# Patient Record
Sex: FEMALE | Race: WHITE | NOT HISPANIC OR LATINO | Employment: OTHER | URBAN - METROPOLITAN AREA
[De-identification: names, ages, dates, MRNs, and addresses within clinical notes are randomized per-mention and may not be internally consistent; named-entity substitution may affect disease eponyms.]

---

## 2017-01-17 DIAGNOSIS — C50.411 MALIGNANT NEOPLASM OF UPPER-OUTER QUADRANT OF RIGHT FEMALE BREAST (HCC): Primary | ICD-10-CM

## 2017-02-02 ENCOUNTER — TELEPHONE (OUTPATIENT)
Dept: FAMILY MEDICINE CLINIC | Facility: CLINIC | Age: 77
End: 2017-02-02

## 2017-02-02 DIAGNOSIS — R60.0 BILATERAL EDEMA OF LOWER EXTREMITY: Primary | ICD-10-CM

## 2017-02-07 ENCOUNTER — TELEPHONE (OUTPATIENT)
Dept: ONCOLOGY | Facility: HOSPITAL | Age: 77
End: 2017-02-07

## 2017-02-07 DIAGNOSIS — C50.411 MALIGNANT NEOPLASM OF UPPER-OUTER QUADRANT OF RIGHT FEMALE BREAST (HCC): Primary | ICD-10-CM

## 2017-02-07 NOTE — TELEPHONE ENCOUNTER
"Pt called because she received notification that she could have another compression sleeve covered by her insurance. She needs an order however. She would like to get it from [facility]. She has had the \"TributeNight\" sleeve in the past and likes it. Order for compression sleeve placed in computer and faxed to [facility].   "

## 2017-03-01 ENCOUNTER — DOCUMENTATION (OUTPATIENT)
Dept: ONCOLOGY | Facility: CLINIC | Age: 77
End: 2017-03-01

## 2017-03-06 ENCOUNTER — OFFICE VISIT (OUTPATIENT)
Dept: ONCOLOGY | Facility: CLINIC | Age: 77
End: 2017-03-06

## 2017-03-06 ENCOUNTER — APPOINTMENT (OUTPATIENT)
Dept: LAB | Facility: HOSPITAL | Age: 77
End: 2017-03-06

## 2017-03-06 ENCOUNTER — APPOINTMENT (OUTPATIENT)
Dept: ONCOLOGY | Facility: CLINIC | Age: 77
End: 2017-03-06

## 2017-03-06 VITALS
OXYGEN SATURATION: 99 % | HEIGHT: 64 IN | HEART RATE: 70 BPM | DIASTOLIC BLOOD PRESSURE: 100 MMHG | SYSTOLIC BLOOD PRESSURE: 175 MMHG | TEMPERATURE: 97.5 F | RESPIRATION RATE: 18 BRPM | WEIGHT: 181.4 LBS | BODY MASS INDEX: 30.97 KG/M2

## 2017-03-06 DIAGNOSIS — Z12.31 ENCOUNTER FOR SCREENING MAMMOGRAM FOR MALIGNANT NEOPLASM OF BREAST: ICD-10-CM

## 2017-03-06 DIAGNOSIS — M81.0 OSTEOPOROSIS: ICD-10-CM

## 2017-03-06 DIAGNOSIS — C50.411 MALIGNANT NEOPLASM OF UPPER-OUTER QUADRANT OF RIGHT FEMALE BREAST (HCC): Primary | ICD-10-CM

## 2017-03-06 PROCEDURE — 99213 OFFICE O/P EST LOW 20 MIN: CPT | Performed by: INTERNAL MEDICINE

## 2017-03-06 PROCEDURE — G0463 HOSPITAL OUTPT CLINIC VISIT: HCPCS | Performed by: INTERNAL MEDICINE

## 2017-03-06 RX ORDER — MORPHINE SULFATE 30 MG/1
60 TABLET ORAL EVERY 4 HOURS PRN
COMMUNITY
End: 2017-03-06

## 2017-03-06 RX ORDER — MORPHINE SULFATE 100 MG/1
180 TABLET ORAL 3 TIMES DAILY
COMMUNITY
End: 2017-03-06

## 2017-03-06 NOTE — PROGRESS NOTES
Subjective:     Reason for follow up:   1. Right breast cancer, stage IA ([id number]), ER/LA and HER2/remberto positive   * status post mastectomy.    * Adjuvant Aromasin was started on 11/13/2014.   2. Osteoporosis.      History of Present Ilness: Tiff Del Rio presents for follow-up of breast cancer. She's on Aromasin. She has lymphedema and a sleeve was ordered for her through [facility]. She feels well without chest pain, vision changes, or new concerns. Mammogram in December was good. No new breast concerns. Tolerating Aromasin well.     Past Medical   Past Medical History   Diagnosis Date   • Breast cancer    • Cancer      Stage IA right breast cancer   • Diabetes mellitus    • Hypertension    • Hypothyroidism    • Osteoporosis    • Stroke      Hemorrhagic   • Stroke, hemorrhagic 2004   • Thyroid cancer      Patient Active Problem List   Diagnosis   • Viral syndrome   • Pneumonia of right lower lobe due to infectious organism   • Hypertension   • Type 2 diabetes mellitus, uncontrolled   • Malignant neoplasm of upper-outer quadrant of right female breast   • Osteoporosis     Social History   Social History     Social History   • Marital status:      Spouse name: N/A   • Number of children: N/A   • Years of education: N/A     Occupational History   • Homemaker      Social History Main Topics   • Smoking status: Never Smoker   • Smokeless tobacco: Never Used   • Alcohol use Yes      Comment: Occasional   • Drug use: No   • Sexual activity: Defer     Other Topics Concern   • Not on file     Social History Narrative      Family History  Family History   Problem Relation Age of Onset   • Hypertension Mother    • Stroke Mother    • Hypertension Father    • Vision loss Father    • Cancer Maternal Grandmother 43     Ovarian     Allergies  Allergies   Allergen Reactions   • Aspirin    • Codeine Phosphate        Medications: The current medication list was reviewed in the EMR.    Review of Systems  Review of Systems  "  Constitutional: Negative for activity change, appetite change, chills, diaphoresis, fatigue, fever and unexpected weight change.   Respiratory: Negative for cough, chest tightness and shortness of breath.    Cardiovascular: Negative for chest pain, palpitations and leg swelling.   Gastrointestinal: Negative for abdominal pain, blood in stool, constipation, diarrhea, nausea and vomiting.   Musculoskeletal: Negative for arthralgias, joint swelling and myalgias.   Hematological: Negative for adenopathy. Does not bruise/bleed easily.          Objective:     Vitals:    03/06/17 1451 03/06/17 1504   BP: (!) 180/120  Comment: Millicent Cobb MA rechecked b/p 175/100   Pulse: 70    Resp: 18    Temp: 97.5 °F (36.4 °C)    SpO2: 99%    Weight: 181 lb 6.4 oz (82.3 kg)    Height: 63.98\" (162.5 cm)    PainSc: 0-No pain      Current Status 3/6/2017   ECOG score 0     GENERAL:  Well-developed, well-nourished female in no acute distress.   SKIN:  Warm, dry without rashes, purpura or petechiae.  LYMPHATICS:  No cervical, supraclavicular, axillary adenopathy.  CHEST:  Lungs clear to percussion and auscultation. Good airflow. Left breast status post mastectomy without palpable masses or skin changes.   CARDIAC:  Regular rate and rhythm without murmurs, rubs or gallops. Normal S1,S2. No edema  EXTREMITIES:  No clubbing, cyanosis or edema.  PSYCHIATRIC:  Normal affect and mood.      Labs and Imaging  Results for orders placed or performed in visit on 09/30/16   TSH   Result Value Ref Range    TSH 0.347 0.270 - 4.200 mIU/mL       Breast imaging: Mammo 12/2016:  IMPRESSION:  There are no findings suspicious for malignancy in the left  breast. Routine follow-up mammography is recommended.      BI-RADS Category 1: Negative.    Assessment/Plan     Assessment:   1. Right breast cancer, upper outer quadrant, stage IA, ER/PA and HER-2/remberto positive, status post mastectomy. The tumor was very small in size and therefore she was not given adjuvant " Herceptin or adjuvant chemotherapy. The greatest dimension of the invasive carcinoma was 3 mm. She is on adjuvant hormonal therapy with Aromasin, which was started in 11/2014. She tolerates Aromasin well. Currently JAX.  2. Osteoporosis with 5% improvement on last scan. Continue calcium and vitamin D.  3. Uncontrolled hypertension. She will follow up with Dr Dorsey. She's on multiple agents, but has run out of Toprol. It's been ordered and should arrive soon.   Plan:     1. Continue exemestane.  2. Annual mammogram due December 2017  3. DEXA scan due November 2017.  4. Patient was instructed on the importance of physical activity, healthy diet, and self breast exams.  Patient will continue calcium and vitamin D supplementation.    5. She will follow up with DR Dorsey regarding medication and blood pressure. If she develops chest pain, vision changes, etc, she knows to go to ER.   Follow-up in 10 months. I asked the patient to call for any questions, concerns, or new symptoms.

## 2017-03-17 ENCOUNTER — TRANSCRIBE ORDERS (OUTPATIENT)
Dept: ADMINISTRATIVE | Facility: HOSPITAL | Age: 77
End: 2017-03-17

## 2017-03-17 DIAGNOSIS — E04.1 THYROID NODULE: Primary | ICD-10-CM

## 2017-03-22 ENCOUNTER — HOSPITAL ENCOUNTER (OUTPATIENT)
Dept: ULTRASOUND IMAGING | Facility: HOSPITAL | Age: 77
Discharge: HOME | End: 2017-03-22
Attending: OTOLARYNGOLOGY | Admitting: OTOLARYNGOLOGY

## 2017-03-22 DIAGNOSIS — E04.1 THYROID NODULE: ICD-10-CM

## 2017-03-22 PROCEDURE — 76536 US EXAM OF HEAD AND NECK: CPT

## 2017-04-07 ENCOUNTER — OFFICE VISIT (OUTPATIENT)
Dept: FAMILY MEDICINE CLINIC | Facility: CLINIC | Age: 77
End: 2017-04-07

## 2017-04-07 VITALS
HEART RATE: 85 BPM | BODY MASS INDEX: 31.41 KG/M2 | SYSTOLIC BLOOD PRESSURE: 146 MMHG | TEMPERATURE: 98 F | HEIGHT: 64 IN | WEIGHT: 184 LBS | DIASTOLIC BLOOD PRESSURE: 104 MMHG | OXYGEN SATURATION: 95 % | RESPIRATION RATE: 16 BRPM

## 2017-04-07 DIAGNOSIS — I10 ESSENTIAL HYPERTENSION: Primary | ICD-10-CM

## 2017-04-07 DIAGNOSIS — H10.021 PINK EYE DISEASE OF RIGHT EYE: ICD-10-CM

## 2017-04-07 DIAGNOSIS — E03.9 HYPOTHYROIDISM (ACQUIRED): ICD-10-CM

## 2017-04-07 DIAGNOSIS — IMO0001 UNCONTROLLED TYPE 2 DIABETES MELLITUS WITHOUT COMPLICATION, WITHOUT LONG-TERM CURRENT USE OF INSULIN: ICD-10-CM

## 2017-04-07 PROCEDURE — 99214 OFFICE O/P EST MOD 30 MIN: CPT | Performed by: FAMILY MEDICINE

## 2017-04-07 RX ORDER — TOBRAMYCIN 3 MG/ML
2 SOLUTION/ DROPS OPHTHALMIC EVERY 8 HOURS SCHEDULED
Qty: 5 ML | Refills: 0 | Status: SHIPPED | OUTPATIENT
Start: 2017-04-07 | End: 2019-05-14

## 2017-04-07 RX ORDER — AMLODIPINE BESYLATE 10 MG/1
10 TABLET ORAL DAILY
Qty: 90 TABLET | Refills: 1 | Status: SHIPPED | OUTPATIENT
Start: 2017-04-07 | End: 2018-09-19 | Stop reason: SDUPTHER

## 2017-04-07 RX ORDER — OLMESARTAN MEDOXOMIL AND HYDROCHLOROTHIAZIDE 40/12.5 40; 12.5 MG/1; MG/1
1 TABLET ORAL DAILY
Qty: 90 TABLET | Refills: 1 | Status: SHIPPED | OUTPATIENT
Start: 2017-04-07 | End: 2017-05-08 | Stop reason: SDUPTHER

## 2017-04-07 RX ORDER — LEVOTHYROXINE SODIUM 112 UG/1
112 TABLET ORAL DAILY
Qty: 90 TABLET | Refills: 1 | Status: SHIPPED | OUTPATIENT
Start: 2017-04-07 | End: 2017-10-11 | Stop reason: SDUPTHER

## 2017-04-07 RX ORDER — METOPROLOL SUCCINATE 200 MG/1
200 TABLET, EXTENDED RELEASE ORAL DAILY
Qty: 90 TABLET | Refills: 1 | Status: SHIPPED | OUTPATIENT
Start: 2017-04-07 | End: 2018-09-19 | Stop reason: SDUPTHER

## 2017-04-07 RX ORDER — FUROSEMIDE 20 MG/1
20 TABLET ORAL DAILY
Qty: 90 TABLET | Refills: 1 | Status: SHIPPED | OUTPATIENT
Start: 2017-04-07 | End: 2017-05-26 | Stop reason: SDUPTHER

## 2017-04-07 NOTE — PATIENT INSTRUCTIONS
This is a very nice 76-year-old who is here for follow-up for blood pressure and diabetes and thyroid.  I will request blood work to ensure that with her ENT doctor when available.  I also will renew her prescriptions to make sure she is taking all of the medicine she is supposed to be on.

## 2017-04-07 NOTE — PROGRESS NOTES
Subjective   Tiff Del Rio is a 76 y.o. female presenting with   Chief Complaint   Patient presents with   • Hypertension     check up    • Diabetes     check up    • Hypothyroidism     check up    • Med Refill     all meds (expect the chemo tab)  sent to [facility]     • Eye Problem     has a red spot under her right lid    • Labs Only     needs a TSH per another dr PATTERSON Comments: 76-year-old  white female nonsmoker with a history of right breast carcinoma and chronic right arm lymphedema and cancer of the thyroid status post resection and followed regularly by Dr. Hameed, is here for routine checkup.  Her blood pressure is not controlled at all but I rechecked it in her left arm and got 160/96.  She does not have any headache or chest pain or palpitations but on reviewing her medication, it turns out that she is not taking her beta blocker nor is she on her calcium channel blocker.  Apparently this was not sent by her mail order, so she is changing to [facility].    She also has had weeping from the right eye with some redness lately.  She says that she take she has pink eye, but also says that for quite some time it spontaneously will suddenly drain suggestive of chronic blepharitis.  I told her I would be happy to send out a prescription for eyedrops but that she should check with her ophthalmologist as well.    He has some blood work requested by her ENT doctor so I will add that to her usual medication.    I reviewed her serial ultrasounds of the neck and noticed that she has a fairly stable enlarged lymph node on the left at 1.8 cm but a slowly enlarging lymph node on the right that had been 2.0 cm in 2014 and now was measured at 2.5 cm.  I told her it would be important to continue her ultrasounds to confirm that this does not get any bigger.    Hypertension     Diabetes     Hypothyroidism     Eye Problem    Associated symptoms include an eye discharge.        The following portions of the  patient's history were reviewed and updated as appropriate: current medications, past family history, past medical history, past social history, past surgical history and problem list.    Review of Systems   Eyes: Positive for discharge.   All other systems reviewed and are negative.      Objective   Physical Exam   Constitutional: She is oriented to person, place, and time. She appears well-developed and well-nourished. No distress.   HENT:   Head: Normocephalic and atraumatic.   Eyes: EOM are normal. Pupils are equal, round, and reactive to light. Right eye exhibits discharge. No scleral icterus.   Neck: Normal range of motion. Neck supple. No thyromegaly present.   Cardiovascular: Normal rate, regular rhythm, normal heart sounds and intact distal pulses.  Exam reveals no friction rub.    No murmur heard.  Pulmonary/Chest: Effort normal and breath sounds normal. No respiratory distress. She has no wheezes. She has no rales.   Musculoskeletal: Normal range of motion. She exhibits no edema or tenderness.   Lymphadenopathy:     She has no cervical adenopathy.   Neurological: She is alert and oriented to person, place, and time. No cranial nerve deficit. Coordination normal.   Skin: Skin is warm and dry. No rash noted. She is not diaphoretic.   Psychiatric: She has a normal mood and affect. Her behavior is normal.   Nursing note and vitals reviewed.      Assessment/Plan   Tiff was seen today for hypertension, diabetes, hypothyroidism, med refill, eye problem and labs only.    Diagnoses and all orders for this visit:    Essential hypertension  -     Comprehensive Metabolic Panel  -     TSH  -     T4, Free    Hypothyroidism (acquired)  -     TSH  -     T4, Free  -     Anti-Thyroglobulin Antibody  -     Thyroglobulin    Uncontrolled type 2 diabetes mellitus without complication, without long-term current use of insulin  -     Comprehensive Metabolic Panel  -     Hemoglobin A1c    Pink eye disease of right eye    Other  orders  -     amLODIPine (NORVASC) 10 MG tablet; Take 1 tablet by mouth Daily.  -     olmesartan-hydrochlorothiazide (BENICAR HCT) 40-12.5 MG per tablet; Take 1 tablet by mouth Daily.  -     furosemide (LASIX) 20 MG tablet; Take 1 tablet by mouth Daily.  -     levothyroxine (SYNTHROID, LEVOTHROID) 112 MCG tablet; Take 1 tablet by mouth Daily.  -     metFORMIN (GLUCOPHAGE) 500 MG tablet; Take 1 tablet by mouth Daily With Breakfast.  -     metoprolol succinate XL (TOPROL-XL) 200 MG 24 hr tablet; Take 1 tablet by mouth Daily.  -     tobramycin (TOBREX) 0.3 % solution ophthalmic solution; Administer 2 drops to the right eye Every 8 (Eight) Hours.                   I would like him to return for another visit in 6 month(s)

## 2017-04-12 LAB
ALBUMIN SERPL-MCNC: 4.4 G/DL (ref 3.5–5.2)
ALBUMIN/GLOB SERPL: 1.6 G/DL
ALP SERPL-CCNC: 90 U/L (ref 39–117)
ALT SERPL-CCNC: 16 U/L (ref 1–33)
AST SERPL-CCNC: 16 U/L (ref 1–32)
BILIRUB SERPL-MCNC: 0.3 MG/DL (ref 0.1–1.2)
BUN SERPL-MCNC: 12 MG/DL (ref 8–23)
BUN/CREAT SERPL: 15 (ref 7–25)
CALCIUM SERPL-MCNC: 9.6 MG/DL (ref 8.6–10.5)
CHLORIDE SERPL-SCNC: 101 MMOL/L (ref 98–107)
CO2 SERPL-SCNC: 28.4 MMOL/L (ref 22–29)
CREAT SERPL-MCNC: 0.8 MG/DL (ref 0.57–1)
GLOBULIN SER CALC-MCNC: 2.7 GM/DL
GLUCOSE SERPL-MCNC: 109 MG/DL (ref 65–99)
HBA1C MFR BLD: 5.36 % (ref 4.8–5.6)
POTASSIUM SERPL-SCNC: 3.9 MMOL/L (ref 3.5–5.2)
PROT SERPL-MCNC: 7.1 G/DL (ref 6–8.5)
SODIUM SERPL-SCNC: 144 MMOL/L (ref 136–145)
T4 FREE SERPL-MCNC: 1.68 NG/DL (ref 0.93–1.7)
THYROGLOB AB SERPL-ACNC: <1 IU/ML (ref 0–0.9)
THYROGLOB SERPL-MCNC: <2 NG/ML
TSH SERPL DL<=0.005 MIU/L-ACNC: 0.14 MIU/ML (ref 0.27–4.2)

## 2017-04-14 ENCOUNTER — TELEPHONE (OUTPATIENT)
Dept: FAMILY MEDICINE CLINIC | Facility: CLINIC | Age: 77
End: 2017-04-14

## 2017-04-14 NOTE — TELEPHONE ENCOUNTER
Pt called asking if she could speak to you regarding her lab results  She said their is one in particular that she has a question about      Her # is [phone]

## 2017-05-08 RX ORDER — OLMESARTAN/HYDROCHLOROTHIAZIDE 40-12.5 MG
TABLET ORAL
Qty: 90 TABLET | Refills: 0 | Status: SHIPPED | OUTPATIENT
Start: 2017-05-08 | End: 2017-09-14 | Stop reason: SDUPTHER

## 2017-05-26 RX ORDER — FUROSEMIDE 20 MG/1
TABLET ORAL
Qty: 90 TABLET | Refills: 0 | Status: SHIPPED | OUTPATIENT
Start: 2017-05-26 | End: 2017-08-24 | Stop reason: SDUPTHER

## 2017-07-24 ENCOUNTER — TRANSCRIBE ORDERS (OUTPATIENT)
Dept: ADMINISTRATIVE | Facility: HOSPITAL | Age: 77
End: 2017-07-24

## 2017-07-24 DIAGNOSIS — C73 THYROID CA (HCC): Primary | ICD-10-CM

## 2017-08-24 RX ORDER — FUROSEMIDE 20 MG/1
TABLET ORAL
Qty: 90 TABLET | Refills: 1 | Status: SHIPPED | OUTPATIENT
Start: 2017-08-24 | End: 2018-09-14 | Stop reason: SDUPTHER

## 2017-09-05 ENCOUNTER — HOSPITAL ENCOUNTER (OUTPATIENT)
Dept: ULTRASOUND IMAGING | Facility: HOSPITAL | Age: 77
Discharge: HOME | End: 2017-09-05
Attending: OTOLARYNGOLOGY | Admitting: OTOLARYNGOLOGY

## 2017-09-05 DIAGNOSIS — C73 THYROID CA (HCC): ICD-10-CM

## 2017-09-05 PROCEDURE — 76536 US EXAM OF HEAD AND NECK: CPT

## 2017-09-14 RX ORDER — OLMESARTAN MEDOXOMIL AND HYDROCHLOROTHIAZIDE 40/12.5 40; 12.5 MG/1; MG/1
TABLET ORAL
Qty: 90 TABLET | Refills: 0 | Status: SHIPPED | OUTPATIENT
Start: 2017-09-14 | End: 2018-09-19 | Stop reason: SDUPTHER

## 2017-10-12 ENCOUNTER — TELEPHONE (OUTPATIENT)
Dept: FAMILY MEDICINE CLINIC | Facility: CLINIC | Age: 77
End: 2017-10-12

## 2017-10-12 RX ORDER — LEVOTHYROXINE SODIUM 112 UG/1
TABLET ORAL
Qty: 90 TABLET | Refills: 1 | Status: SHIPPED | OUTPATIENT
Start: 2017-10-12 | End: 2018-04-26 | Stop reason: SDUPTHER

## 2017-11-02 ENCOUNTER — TELEPHONE (OUTPATIENT)
Dept: FAMILY MEDICINE CLINIC | Facility: CLINIC | Age: 77
End: 2017-11-02

## 2017-11-02 RX ORDER — AZITHROMYCIN 250 MG/1
TABLET, FILM COATED ORAL
Qty: 6 TABLET | Refills: 0 | Status: SHIPPED | OUTPATIENT
Start: 2017-11-02 | End: 2017-11-13

## 2017-11-02 NOTE — TELEPHONE ENCOUNTER
Pt called stating she has a bad cough and does not want it to get worse and wants to know if she can have a Z-pk sent in   Send to [facility]

## 2017-11-13 ENCOUNTER — OFFICE VISIT (OUTPATIENT)
Dept: FAMILY MEDICINE CLINIC | Facility: CLINIC | Age: 77
End: 2017-11-13

## 2017-11-13 VITALS
TEMPERATURE: 98.1 F | WEIGHT: 184.7 LBS | BODY MASS INDEX: 31.53 KG/M2 | SYSTOLIC BLOOD PRESSURE: 156 MMHG | OXYGEN SATURATION: 96 % | HEART RATE: 78 BPM | HEIGHT: 64 IN | DIASTOLIC BLOOD PRESSURE: 82 MMHG

## 2017-11-13 DIAGNOSIS — J40 BRONCHITIS: Primary | ICD-10-CM

## 2017-11-13 PROCEDURE — 99213 OFFICE O/P EST LOW 20 MIN: CPT | Performed by: FAMILY MEDICINE

## 2017-11-13 RX ORDER — PREDNISONE 10 MG/1
20 TABLET ORAL DAILY
Qty: 10 TABLET | Refills: 0 | Status: SHIPPED | OUTPATIENT
Start: 2017-11-13 | End: 2017-12-27

## 2017-11-13 NOTE — PROGRESS NOTES
"Subjective   Tiff Del Rio is a 77 y.o. female.     Chief Complaint   Patient presents with   • URI     x 2 wks was taking a z-pack not better   • Cough        History of Present Illness    Cough symptoms for over 2 weeks.  Started off with a mild to moderate cough with no fever and some head congestion.  Her primary care physician prescribed a Z-Evelio.  She states normally they help with a didn't this time.  She's had no fever at all in the last 3 weeks.    Now she is complaining of cough that is better during the day but still bothers her at nighttime.  Last night she woke about every hour with a cough with some mild stridor symptoms.  She has no acid taste in her mouth or acid reflux heartburn symptoms.  History of diabetes but A1c earlier this year was well controlled.  She's having no GI symptoms.  No shortness of breath.  She had pneumonia a few years ago.      The following portions of the patient's history were reviewed and updated as appropriate: allergies, current medications, past family history, past medical history, past social history, past surgical history and problem list.          Review of Systems   Constitutional: Negative for chills, fatigue and fever.   HENT: Positive for congestion. Negative for sore throat and trouble swallowing.    Respiratory: Positive for cough and stridor (???). Negative for shortness of breath and wheezing.    Gastrointestinal: Negative.        Objective   Blood pressure 156/82, pulse 78, temperature 98.1 °F (36.7 °C), temperature source Oral, height 64\" (162.6 cm), weight 184 lb 11.2 oz (83.8 kg), SpO2 96 %, not currently breastfeeding.  Physical Exam   Constitutional: No distress.   No acute distress.  Nontoxic.   HENT:   Right Ear: Tympanic membrane, external ear and ear canal normal.   Left Ear: Tympanic membrane, external ear and ear canal normal.   Nose: Nose normal.   Mouth/Throat: Oropharynx is clear and moist. No oropharyngeal exudate.   Eyes: Conjunctivae are " normal. Right eye exhibits no discharge. Left eye exhibits no discharge. No scleral icterus.   Cardiovascular: Normal rate.    Pulmonary/Chest: Effort normal and breath sounds normal. No stridor. No respiratory distress. She has no wheezes. She has no rales.   No tachypnea.  No coughing in the office.  Voice unremarkable.  No stridor.   Lymphadenopathy:     She has cervical adenopathy ( Minimal anterior cervical lymphadenopathy, nontender.  Patient states is chronic.  Also followed by ENT.).   Neurological: She is alert.   Skin: No rash noted.   Nursing note and vitals reviewed.      Assessment/Plan   Tiff was seen today for uri and cough.    Diagnoses and all orders for this visit:    Bronchitis    Other orders  -     predniSONE (DELTASONE) 10 MG tablet; Take 2 tablets by mouth Daily.      Acute tracheobronchitis.  Likely viral.  Patient was treated with a azithromycin 5 day pack.  She has no signs or symptoms or findings of pneumonia.  Good O2 sats.  No tachypnea.  She's having some stridorous symptoms at nighttime.  Recommending low-dose prednisone 20 mg a day for 5 days.  Side effects discussed.  Her diabetes has been fairly well controlled. With high fever, shortness of breath, worsening cough, or other severe symptoms seek medical attention.

## 2017-12-20 ENCOUNTER — HOSPITAL ENCOUNTER (OUTPATIENT)
Dept: MAMMOGRAPHY | Facility: HOSPITAL | Age: 77
Discharge: HOME | End: 2017-12-20
Attending: INTERNAL MEDICINE | Admitting: INTERNAL MEDICINE

## 2017-12-20 ENCOUNTER — HOSPITAL ENCOUNTER (OUTPATIENT)
Dept: BONE DENSITY | Facility: HOSPITAL | Age: 77
Discharge: HOME | End: 2017-12-20
Attending: INTERNAL MEDICINE

## 2017-12-20 DIAGNOSIS — M81.0 OSTEOPOROSIS: ICD-10-CM

## 2017-12-20 DIAGNOSIS — C50.411 MALIGNANT NEOPLASM OF UPPER-OUTER QUADRANT OF RIGHT FEMALE BREAST (HCC): ICD-10-CM

## 2017-12-20 DIAGNOSIS — Z12.31 ENCOUNTER FOR SCREENING MAMMOGRAM FOR MALIGNANT NEOPLASM OF BREAST: ICD-10-CM

## 2017-12-20 PROCEDURE — 77080 DXA BONE DENSITY AXIAL: CPT

## 2017-12-20 PROCEDURE — G0202 SCR MAMMO BI INCL CAD: HCPCS

## 2017-12-27 ENCOUNTER — APPOINTMENT (OUTPATIENT)
Dept: LAB | Facility: HOSPITAL | Age: 77
End: 2017-12-27

## 2017-12-27 ENCOUNTER — OFFICE VISIT (OUTPATIENT)
Dept: ONCOLOGY | Facility: CLINIC | Age: 77
End: 2017-12-27

## 2017-12-27 VITALS
SYSTOLIC BLOOD PRESSURE: 168 MMHG | WEIGHT: 185.6 LBS | TEMPERATURE: 97.6 F | DIASTOLIC BLOOD PRESSURE: 102 MMHG | HEART RATE: 63 BPM | BODY MASS INDEX: 31.69 KG/M2 | RESPIRATION RATE: 12 BRPM | HEIGHT: 64 IN | OXYGEN SATURATION: 99 %

## 2017-12-27 DIAGNOSIS — M81.0 OSTEOPOROSIS, UNSPECIFIED OSTEOPOROSIS TYPE, UNSPECIFIED PATHOLOGICAL FRACTURE PRESENCE: ICD-10-CM

## 2017-12-27 DIAGNOSIS — Z17.0 MALIGNANT NEOPLASM OF UPPER-OUTER QUADRANT OF RIGHT BREAST IN FEMALE, ESTROGEN RECEPTOR POSITIVE (HCC): Primary | ICD-10-CM

## 2017-12-27 DIAGNOSIS — C50.411 MALIGNANT NEOPLASM OF UPPER-OUTER QUADRANT OF RIGHT BREAST IN FEMALE, ESTROGEN RECEPTOR POSITIVE (HCC): Primary | ICD-10-CM

## 2017-12-27 PROCEDURE — 99214 OFFICE O/P EST MOD 30 MIN: CPT | Performed by: INTERNAL MEDICINE

## 2017-12-27 PROCEDURE — G0463 HOSPITAL OUTPT CLINIC VISIT: HCPCS | Performed by: INTERNAL MEDICINE

## 2017-12-27 RX ORDER — OMEPRAZOLE 40 MG/1
CAPSULE, DELAYED RELEASE ORAL
COMMUNITY
Start: 2017-11-21

## 2017-12-27 RX ORDER — EXEMESTANE 25 MG/1
25 TABLET ORAL DAILY
Qty: 90 TABLET | Refills: 3 | Status: SHIPPED | OUTPATIENT
Start: 2017-12-27 | End: 2019-01-16 | Stop reason: SDUPTHER

## 2017-12-27 NOTE — PROGRESS NOTES
Subjective:     Reason for follow up:   1. Right breast cancer, stage IA ([id number]), ER/[place] and HER2/remberto positive   * status post mastectomy.    * Adjuvant Aromasin was started on 11/13/2014.   2. Osteoporosis.      History of Present Ilness: Tiff Del Rio presents for follow-up of breast cancer. She had a mammogram and bone density scan performed a week and half ago.  The mammogram was benign an the bone density showed continued improvement in her osteoporosis.  She is taking calcium and vitamin D.  She has not been started on therapy for osteoporosis because of her underlying dental disease which she sees a dentist every 3 months.  She does not have any new symptoms today.  She notes that she's been out of her Aromasin for 2 months. She is recently been treated for reflux with an up resolved and her symptoms are improving.  Her blood pressure is elevated today and she denies any vision changes, chest pain, shortness of breath, headache.    Past Medical   Past Medical History:   Diagnosis Date   • Breast cancer    • Cancer     Stage IA right breast cancer   • Diabetes mellitus    • Hypertension    • Hypothyroidism    • Osteoporosis    • Stroke     Hemorrhagic   • Stroke, hemorrhagic 2004   • Thyroid cancer      Patient Active Problem List   Diagnosis   • Viral syndrome   • Essential hypertension   • Uncontrolled type 2 diabetes mellitus without complication, without long-term current use of insulin   • Malignant neoplasm of upper-outer quadrant of right breast in female, estrogen receptor positive   • Osteoporosis   • Hypothyroidism (acquired)   • Pink eye disease of right eye     Social History   Social History     Social History   • Marital status:      Spouse name: N/A   • Number of children: N/A   • Years of education: N/A     Occupational History   • Homemaker      Social History Main Topics   • Smoking status: Never Smoker   • Smokeless tobacco: Never Used   • Alcohol use Yes      Comment: Occasional  "  • Drug use: No   • Sexual activity: Defer     Other Topics Concern   • Not on file     Social History Narrative      Family History  Family History   Problem Relation Age of Onset   • Hypertension Mother    • Stroke Mother    • Hypertension Father    • Vision loss Father    • Cancer Maternal Grandmother 43     Ovarian     Allergies  Allergies   Allergen Reactions   • Aspirin    • Codeine Phosphate        Medications: The current medication list was reviewed in the EMR.    Review of Systems  Review of Systems   Constitutional: Negative for activity change, appetite change, chills, diaphoresis, fatigue, fever and unexpected weight change.   Respiratory: Negative for cough, chest tightness and shortness of breath.    Cardiovascular: Negative for chest pain, palpitations and leg swelling.   Gastrointestinal: Negative for abdominal pain, blood in stool, constipation, diarrhea, nausea and vomiting.   Musculoskeletal: Negative for arthralgias, joint swelling and myalgias.   Hematological: Negative for adenopathy. Does not bruise/bleed easily.          Objective:     Vitals:    12/27/17 1251   BP: (!) 168/102   Pulse: 63   Resp: 12   Temp: 97.6 °F (36.4 °C)   TempSrc: Oral   SpO2: 99%   Weight: 84.2 kg (185 lb 9.6 oz)   Height: 161.5 cm (63.58\")   PainSc: 0-No pain     Current Status 12/27/2017   ECOG score 0     GENERAL: female comfortable, no acute distress  SKIN:  Warm, without rashes, purpura or petechiae.   EYES:  EOMs intact.  Conjunctivae normal. Pupils equal and reactive to light.   EARS:  Hearing intact.  LYMPHATICS:  No cervical, supraclavicular, axillary adenopathy.  RESP:  Lungs clear to percussion and auscultation. Good airflow. Normal effort.   CHEST: Mediport -No; Breast exam - Yes, describe: right breast status post mastectomy with well-healed incisions palpable masses or skin changes  CARDIAC:  Regular rate and rhythm without murmurs, rubs or gallops. Normal S1,S2. Lower extremity edema:  No  GI:  Soft, " nontender, normal bowel sounds, no hepatosplenomegaly  PSYCHIATRIC:  Normal affect and mood; alert and oriented x 3; Insight and judgement appropriate        Labs and Imaging  Results for orders placed or performed in visit on 04/07/17   Comprehensive Metabolic Panel   Result Value Ref Range    Glucose 109 (H) 65 - 99 mg/dL    BUN 12 8 - 23 mg/dL    Creatinine 0.80 0.57 - 1.00 mg/dL    eGFR Non African Am 69 >60 mL/min/1.73    eGFR African Am 84 >60 mL/min/1.73    BUN/Creatinine Ratio 15.0 7.0 - 25.0    Sodium 144 136 - 145 mmol/L    Potassium 3.9 3.5 - 5.2 mmol/L    Chloride 101 98 - 107 mmol/L    Total CO2 28.4 22.0 - 29.0 mmol/L    Calcium 9.6 8.6 - 10.5 mg/dL    Total Protein 7.1 6.0 - 8.5 g/dL    Albumin 4.40 3.50 - 5.20 g/dL    Globulin 2.7 gm/dL    A/G Ratio 1.6 g/dL    Total Bilirubin 0.3 0.1 - 1.2 mg/dL    Alkaline Phosphatase 90 39 - 117 U/L    AST (SGOT) 16 1 - 32 U/L    ALT (SGPT) 16 1 - 33 U/L   Hemoglobin A1c   Result Value Ref Range    Hemoglobin A1C 5.36 4.80 - 5.60 %   TSH   Result Value Ref Range    TSH 0.135 (L) 0.270 - 4.200 mIU/mL   T4, Free   Result Value Ref Range    Free T4 1.68 0.93 - 1.70 ng/dL   Anti-Thyroglobulin Antibody   Result Value Ref Range    Thyroglobulin Ab <1.0 0.0 - 0.9 IU/mL   Thyroglobulin   Result Value Ref Range    Thyroglobulin (TG-SELIN) <2.0 ng/mL       Breast imaging/DEXA:   Dexa Bone Density Axial    Result Date: 12/20/2017  Narrative: LUMBAR SPINE AND LEFT HIP BONE DENSITOMETRY  HISTORY: This is a 77-year-old female who is postmenopausal. She has previous bone density measurement on 11/23/2015 showing mild to moderate osteopenia in the lumbar spine and mild osteoporosis in the left femoral neck.  TECHNIQUE: The T score compares the patient's bone mineral density with the peak bone mass of young normal patients. Patients with T-scores between 1.0 and 2.5 standard deviations below the mean are osteopenic. Patients with T-scores greater than 2.5 standard deviation below  the mean are osteoporotic.  FINDINGS: The bone mineral density in the lumbar spine has a T value of -1.6 representing mild to moderate osteopenia showing minimal improvement of 0.2%. Bone mineral density in the left femoral neck has a T value of -2.5 representing mild osteoporosis showing improvement of 1%.  This report was finalized on 12/20/2017 1:55 PM by Dr. Germain Vang MD.      Mammo Screening Modified Left With Cad    Result Date: 12/21/2017  Narrative: SCREENING MAMMOGRAM WITH R2  HISTORY: Screening. Previous right mastectomy for breast cancer.  Standard images and R2 were obtained. The left breast shows scattered fibroglandular density. There are no findings to suggest malignancy.  CONCLUSION: Negative left-sided mammogram showing no change from 12/19/2016.  BI-RADS Category 1:  Negative.   This report was finalized on 12/21/2017 3:17 PM by Dr. Germain Vang MD.          Assessment/Plan     Assessment:   1. Right breast cancer, upper outer quadrant, stage IA, ER/ND and HER-2/remberto positive, status post mastectomy. The tumor was very small in size and therefore she was not given adjuvant Herceptin or adjuvant chemotherapy. The greatest dimension of the invasive carcinoma was 3 mm. She is on adjuvant hormonal therapy with Aromasin, which was started in 11/2014.    * Continues to tolerate Aromasin well.     * Remains no evidence of disease.   2. Osteoporosis with continued improvement on DEXA. Continue calcium and vitamin D. I'd like to do some form of bisphosphonate, but patient has severe dental disease per report. She will talk with her dentist regarding this.     Plan:     1. Continue exemestane.  2. Annual mammogram due December 2018  3. DEXA scan due November 2019.  4. Patient was instructed on the importance of physical activity, healthy diet, and self breast exams.  Patient will continue calcium and vitamin D supplementation.    5.  she will touch base with her dentist and let us know if he gets  clearance for any kind of treatment for her osteoporosis.    Follow-up in 6 months. I asked the patient to call for any questions, concerns, or new symptoms.

## 2018-04-26 RX ORDER — LEVOTHYROXINE SODIUM 112 UG/1
TABLET ORAL
Qty: 90 TABLET | Refills: 0 | Status: SHIPPED | OUTPATIENT
Start: 2018-04-26 | End: 2018-07-28 | Stop reason: SDUPTHER

## 2018-05-15 ENCOUNTER — APPOINTMENT (OUTPATIENT)
Dept: GENERAL RADIOLOGY | Facility: HOSPITAL | Age: 78
End: 2018-05-15

## 2018-05-15 PROCEDURE — 73560 X-RAY EXAM OF KNEE 1 OR 2: CPT

## 2018-05-15 PROCEDURE — 99283 EMERGENCY DEPT VISIT LOW MDM: CPT

## 2018-05-16 ENCOUNTER — HOSPITAL ENCOUNTER (EMERGENCY)
Facility: HOSPITAL | Age: 78
Discharge: HOME | End: 2018-05-16
Attending: EMERGENCY MEDICINE | Admitting: EMERGENCY MEDICINE

## 2018-05-16 VITALS
RESPIRATION RATE: 16 BRPM | SYSTOLIC BLOOD PRESSURE: 195 MMHG | HEART RATE: 84 BPM | WEIGHT: 180 LBS | HEIGHT: 64 IN | BODY MASS INDEX: 30.73 KG/M2 | OXYGEN SATURATION: 98 % | TEMPERATURE: 98.8 F | DIASTOLIC BLOOD PRESSURE: 102 MMHG

## 2018-05-16 DIAGNOSIS — M25.461 EFFUSION OF RIGHT KNEE: ICD-10-CM

## 2018-05-16 DIAGNOSIS — S80.01XA CONTUSION OF RIGHT KNEE, INITIAL ENCOUNTER: Primary | ICD-10-CM

## 2018-05-16 RX ORDER — HYDROCODONE BITARTRATE AND ACETAMINOPHEN 5; 325 MG/1; MG/1
1 TABLET ORAL EVERY 6 HOURS PRN
Qty: 12 TABLET | Refills: 0 | Status: SHIPPED | OUTPATIENT
Start: 2018-05-16 | End: 2019-01-16

## 2018-05-16 RX ORDER — HYDROCODONE BITARTRATE AND ACETAMINOPHEN 7.5; 325 MG/1; MG/1
1 TABLET ORAL ONCE
Status: COMPLETED | OUTPATIENT
Start: 2018-05-16 | End: 2018-05-16

## 2018-05-16 RX ADMIN — HYDROCODONE BITARTRATE AND ACETAMINOPHEN 1 TABLET: 7.5; 325 TABLET ORAL at 01:42

## 2018-05-16 NOTE — ED NOTES
"Pt reports falling and injuring right knee. Pt states \"I was carrying a step stool and tripped.\"      Gabriela Day RN  05/15/18 [id number]    "

## 2018-05-16 NOTE — ED PROVIDER NOTES
The patient presents complaining of R knee pain after the pt tripped and fell while carrying a stepping stool. The pt denies LOC. The pt states that the main pain is on the top of her knee. Discussed that the pt will be given a knee immobilizer and crutches if she wanted them.     PEx  Pt is resting comfortably, in no distress, and without focal neurologic deficit  Cardiovascular exam is within normal limits and without murmur  Tenderness to anterior R knee with small effusion, no deformity, compartment soft, pulses palpable    MD ATTESTATION NOTE    The NICHO and I have discussed this patient's history, physical exam, and treatment plan.  I have reviewed the documentation and personally had a face to face interaction with the patient. I affirm the documentation and agree with the treatment and plan.  The attached note describes my personal findings.      Documentation assistance provided by gregorio Domingo for Dr. Cobb.  Information recorded by the [email or web address] was done at my direction and has been verified and validated by me.             Jennifer Domingo  05/16/18 0143       Alonso Cobb MD  05/17/18 0239

## 2018-05-16 NOTE — ED PROVIDER NOTES
EMERGENCY DEPARTMENT ENCOUNTER    CHIEF COMPLAINT  Chief Complaint: R knee pain  History given by: pt  History limited by: nothing  Room Number: 40/40  PMD: Christian Dorsey MD      HPI:  Pt is a 77 y.o. female who presents complaining of R knee pain that began yesterday around 1500 secondary to tripping and falling over a step stool and dust pan. Pt reports that she is currently unable to bear weight. Pt denies any other injuries from the fall. Pt has no other complaints at this time.    Duration: Began around 1500  Onset: sudden  Timing: constant  Location: R knee  Quality: pain  Intensity/Severity: moderate  Aggravating Factors: bearing weight    PAST MEDICAL HISTORY  Active Ambulatory Problems     Diagnosis Date Noted   • Viral syndrome 02/24/2016   • Essential hypertension 04/04/2016   • Uncontrolled type 2 diabetes mellitus without complication, without long-term current use of insulin 04/04/2016   • Malignant neoplasm of upper-outer quadrant of right breast in female, estrogen receptor positive 05/23/2016   • Osteoporosis 05/23/2016   • Hypothyroidism (acquired) 04/07/2017   • Pink eye disease of right eye 04/07/2017     Resolved Ambulatory Problems     Diagnosis Date Noted   • Pneumonia of right lower lobe due to infectious organism 04/03/2016     Past Medical History:   Diagnosis Date   • Breast cancer    • Cancer    • Diabetes mellitus    • Hypertension    • Hypothyroidism    • Osteoporosis    • Stroke    • Stroke, hemorrhagic 2004   • Thyroid cancer        PAST SURGICAL HISTORY  Past Surgical History:   Procedure Laterality Date   • BREAST BIOPSY Right 10/2014   • BREAST SURGERY  2014    Right mastectomy   • EYE SURGERY      Cataract 1995 and 2001   • HYSTERECTOMY  1972    With oophorectomy   • MASTECTOMY Right 10/2014   • THYROIDECTOMY  2010       FAMILY HISTORY  Family History   Problem Relation Age of Onset   • Hypertension Mother    • Stroke Mother    • Hypertension Father    • Vision loss Father     • Cancer Maternal Grandmother 43        Ovarian       SOCIAL HISTORY  Social History     Social History   • Marital status:      Spouse name: N/A   • Number of children: N/A   • Years of education: N/A     Occupational History   • Homemaker      Social History Main Topics   • Smoking status: Never Smoker   • Smokeless tobacco: Never Used   • Alcohol use Yes      Comment: Occasional   • Drug use: No   • Sexual activity: Defer     Other Topics Concern   • Not on file     Social History Narrative   • No narrative on file       ALLERGIES  Codeine phosphate    REVIEW OF SYSTEMS  Review of Systems   Constitutional: Negative for fever.   HENT: Negative for sore throat.    Eyes: Negative.    Respiratory: Negative for cough and shortness of breath.    Cardiovascular: Negative for chest pain.   Gastrointestinal: Negative for abdominal pain, diarrhea and vomiting.   Genitourinary: Negative for dysuria.   Musculoskeletal: Positive for arthralgias (R knee). Negative for neck pain.   Skin: Negative for rash.   Allergic/Immunologic: Negative.    Neurological: Negative for weakness, numbness and headaches.   Hematological: Negative.    Psychiatric/Behavioral: Negative.    All other systems reviewed and are negative.      PHYSICAL EXAM  ED Triage Vitals   Temp Heart Rate Resp BP SpO2   05/15/18 2324 05/15/18 2323 05/15/18 2347 05/15/18 2347 05/15/18 2323   98.8 °F (37.1 °C) 84 16 (!) 195/102 98 %      Temp src Heart Rate Source Patient Position BP Location FiO2 (%)   05/15/18 2324 -- -- -- --   Oral           Physical Exam   Constitutional: She is oriented to person, place, and time and well-developed, well-nourished, and in no distress. No distress.   HENT:   Head: Normocephalic and atraumatic.   Eyes: EOM are normal. Pupils are equal, round, and reactive to light.   Neck: Normal range of motion. Neck supple.   Cardiovascular: Normal rate, regular rhythm and normal heart sounds.    Pulmonary/Chest: Effort normal and breath  sounds normal. No respiratory distress.   Abdominal: Soft. There is no tenderness. There is no rebound and no guarding.   Musculoskeletal: Normal range of motion. She exhibits no edema.        Right knee: She exhibits swelling (of the soft tissue) and effusion (joint). Tenderness (anteriorly) found.   Neurological: She is alert and oriented to person, place, and time. She has normal sensation and normal strength.   Skin: Skin is warm and dry. No rash noted.   Psychiatric: Mood and affect normal.   Nursing note and vitals reviewed.      RADIOLOGY  XR Knee 1 or 2 View Right   Final Result   1. No acute osseous abnormality.       This report was finalized on 5/16/2018 12:16 AM by Filippo Sepulveda M.D.               I ordered the above noted radiological studies. Interpreted by radiologist. Reviewed by me in PACS.       PROCEDURES  Procedures      PROGRESS AND CONSULTS     0114 Discussed Knee XR that shows changes in arthritis, but no fracture. Also discussed the plan to treat the pain with medication and apply a splint before being discharged. Advised the pt to f/u with an orthopedic for further evaluation. Pt understands and agrees with the plan, all questions answered.    0127 Placed order for splint application. Also ordered Norco for pain.    0131 Reviewed pt's history and workup with Dr. Cobb. After a bedside evaluation, they agree with the plan of care.      MEDICAL DECISION MAKING  Results were reviewed/discussed with the patient and they were also made aware of online access. Pt also made aware that some labs, such as cultures, will not be resulted during ER visit and follow up with PMD is necessary.     MDM  Number of Diagnoses or Management Options     Amount and/or Complexity of Data Reviewed  Tests in the radiology section of CPT®: reviewed and ordered (Knee XR is unremarkable.)  Decide to obtain previous medical records or to obtain history from someone other than the patient: yes  Review and summarize  past medical records: yes (Pt has no pertinent previous ED visits.)  Independent visualization of images, tracings, or specimens: yes    Patient Progress  Patient progress: stable         DIAGNOSIS  Final diagnoses:   Contusion of right knee, initial encounter   Effusion of right knee       DISPOSITION  DISCHARGE    Patient discharged in stable condition.    Reviewed implications of results, diagnosis, meds, responsibility to follow up, warning signs and symptoms of possible worsening, potential complications and reasons to return to ER, including any new or worsening symptoms.    Patient/Family voiced understanding of above instructions.    Discussed plan for discharge, as there is no emergent indication for admission. Patient referred to primary care provider for BP management due to today's BP. Pt/family is agreeable and understands need for follow up and repeat testing.  Pt is aware that discharge does not mean that nothing is wrong but it indicates no emergency is present that requires admission and they must continue care with follow-up as given below or physician of their choice.     FOLLOW-UP  Thompson Godinez MD  [address]  [facility] [id number] [phone]    Schedule an appointment as soon as possible for a visit       Christian Dorsey MD  [address]  [facility] [id number] [phone]    Schedule an appointment as soon as possible for a visit            Medication List      New Prescriptions    HYDROcodone-acetaminophen 5-325 MG per tablet  Commonly known as:  NORCO  Take 1 tablet by mouth Every 6 (Six) Hours As Needed for Moderate Pain .              Latest Documented Vital Signs:  As of 4:53 AM  BP- (!) 195/102 HR- 84 Temp- 98.8 °F (37.1 °C) (Oral) O2 sat- 98%    --  Documentation assistance provided by gregorio Hdez for Eduar Patel PA-C.  Information recorded by the gregorio was done at my direction and has been verified and validated by me.       Mora  Ketty  05/16/18 0145       JOMAR Tatum  05/16/18 0453

## 2018-05-16 NOTE — DISCHARGE INSTRUCTIONS
Wear splint, apply ice and elevate, pain medicine as needed, call in the morning to schedule follow up with the orthopedist.

## 2018-05-17 ENCOUNTER — TELEPHONE (OUTPATIENT)
Dept: SOCIAL WORK | Facility: HOSPITAL | Age: 78
End: 2018-05-17

## 2018-05-17 NOTE — TELEPHONE ENCOUNTER
ED f/u phone call: states she is wearing splint and pain is controlled w/ prescribed pain med. States she will call orthopedist tomorrow for f/u antoine't. No questions/concerns

## 2018-05-21 ENCOUNTER — OFFICE VISIT (OUTPATIENT)
Dept: FAMILY MEDICINE CLINIC | Facility: CLINIC | Age: 78
End: 2018-05-21

## 2018-05-21 VITALS
TEMPERATURE: 97.6 F | HEIGHT: 64 IN | BODY MASS INDEX: 29.69 KG/M2 | OXYGEN SATURATION: 99 % | WEIGHT: 173.9 LBS | HEART RATE: 69 BPM | DIASTOLIC BLOOD PRESSURE: 84 MMHG | SYSTOLIC BLOOD PRESSURE: 136 MMHG

## 2018-05-21 DIAGNOSIS — E03.9 HYPOTHYROIDISM (ACQUIRED): ICD-10-CM

## 2018-05-21 DIAGNOSIS — I10 ESSENTIAL HYPERTENSION: ICD-10-CM

## 2018-05-21 DIAGNOSIS — S80.01XD CONTUSION OF RIGHT KNEE, SUBSEQUENT ENCOUNTER: Primary | ICD-10-CM

## 2018-05-21 PROBLEM — S80.02XD CONTUSION OF KNEE AND LOWER LEG, LEFT, SUBSEQUENT ENCOUNTER: Status: ACTIVE | Noted: 2018-05-21

## 2018-05-21 PROBLEM — S80.12XD CONTUSION OF KNEE AND LOWER LEG, LEFT, SUBSEQUENT ENCOUNTER: Status: ACTIVE | Noted: 2018-05-21

## 2018-05-21 PROCEDURE — 99213 OFFICE O/P EST LOW 20 MIN: CPT | Performed by: FAMILY MEDICINE

## 2018-05-21 NOTE — PATIENT INSTRUCTIONS
This is a very nice 77-year-old who is recovering nicely from a fall.  I would like her to call if her knee continues to give her problems.

## 2018-07-24 ENCOUNTER — TRANSCRIBE ORDERS (OUTPATIENT)
Dept: ADMINISTRATIVE | Facility: HOSPITAL | Age: 78
End: 2018-07-24

## 2018-07-24 DIAGNOSIS — C73 THYROID CANCER (HCC): Primary | ICD-10-CM

## 2018-07-30 RX ORDER — LEVOTHYROXINE SODIUM 112 UG/1
TABLET ORAL
Qty: 90 TABLET | Refills: 3 | Status: SHIPPED | OUTPATIENT
Start: 2018-07-30 | End: 2019-05-13 | Stop reason: HOSPADM

## 2018-09-14 RX ORDER — FUROSEMIDE 20 MG/1
20 TABLET ORAL DAILY
Qty: 90 TABLET | Refills: 1 | Status: SHIPPED | OUTPATIENT
Start: 2018-09-14

## 2018-09-14 NOTE — TELEPHONE ENCOUNTER
Pt would like lab order for thyroid level   She will have antoine  Monday in [facility] for [place]   And they want authorization from you to draw a blood

## 2018-09-17 ENCOUNTER — HOSPITAL ENCOUNTER (OUTPATIENT)
Dept: ULTRASOUND IMAGING | Facility: HOSPITAL | Age: 78
Discharge: HOME | End: 2018-09-17
Attending: OTOLARYNGOLOGY | Admitting: OTOLARYNGOLOGY

## 2018-09-17 DIAGNOSIS — C73 THYROID CANCER (HCC): ICD-10-CM

## 2018-09-17 PROCEDURE — 76536 US EXAM OF HEAD AND NECK: CPT

## 2018-09-19 RX ORDER — OLMESARTAN MEDOXOMIL AND HYDROCHLOROTHIAZIDE 40/12.5 40; 12.5 MG/1; MG/1
1 TABLET ORAL DAILY
Qty: 90 TABLET | Refills: 0 | Status: SHIPPED | OUTPATIENT
Start: 2018-09-19

## 2018-09-19 RX ORDER — METOPROLOL SUCCINATE 200 MG/1
200 TABLET, EXTENDED RELEASE ORAL DAILY
Qty: 90 TABLET | Refills: 0 | Status: SHIPPED | OUTPATIENT
Start: 2018-09-19

## 2018-09-19 RX ORDER — AMLODIPINE BESYLATE 10 MG/1
10 TABLET ORAL DAILY
Qty: 90 TABLET | Refills: 0 | Status: ON HOLD | OUTPATIENT
Start: 2018-09-19 | End: 2019-05-12

## 2019-01-16 ENCOUNTER — OFFICE VISIT (OUTPATIENT)
Dept: FAMILY MEDICINE CLINIC | Facility: CLINIC | Age: 79
End: 2019-01-16

## 2019-01-16 VITALS
DIASTOLIC BLOOD PRESSURE: 72 MMHG | BODY MASS INDEX: 30.05 KG/M2 | OXYGEN SATURATION: 99 % | WEIGHT: 176 LBS | HEART RATE: 68 BPM | TEMPERATURE: 98.7 F | HEIGHT: 64 IN | SYSTOLIC BLOOD PRESSURE: 126 MMHG | RESPIRATION RATE: 20 BRPM

## 2019-01-16 DIAGNOSIS — E03.9 HYPOTHYROIDISM (ACQUIRED): ICD-10-CM

## 2019-01-16 DIAGNOSIS — Z12.31 ENCOUNTER FOR SCREENING MAMMOGRAM FOR MALIGNANT NEOPLASM OF BREAST: ICD-10-CM

## 2019-01-16 DIAGNOSIS — I10 ESSENTIAL HYPERTENSION: Primary | ICD-10-CM

## 2019-01-16 DIAGNOSIS — C50.411 MALIGNANT NEOPLASM OF UPPER-OUTER QUADRANT OF RIGHT BREAST IN FEMALE, ESTROGEN RECEPTOR POSITIVE (HCC): ICD-10-CM

## 2019-01-16 DIAGNOSIS — Z17.0 MALIGNANT NEOPLASM OF UPPER-OUTER QUADRANT OF RIGHT BREAST IN FEMALE, ESTROGEN RECEPTOR POSITIVE (HCC): ICD-10-CM

## 2019-01-16 DIAGNOSIS — IMO0001 UNCONTROLLED TYPE 2 DIABETES MELLITUS WITHOUT COMPLICATION, WITHOUT LONG-TERM CURRENT USE OF INSULIN: ICD-10-CM

## 2019-01-16 PROCEDURE — 99213 OFFICE O/P EST LOW 20 MIN: CPT | Performed by: FAMILY MEDICINE

## 2019-01-16 RX ORDER — EXEMESTANE 25 MG/1
25 TABLET ORAL DAILY
Qty: 90 TABLET | Refills: 0 | Status: SHIPPED | OUTPATIENT
Start: 2019-01-16

## 2019-01-16 NOTE — PROGRESS NOTES
Subjective   Tiff Del Rio is a 78 y.o. female presenting with   Chief Complaint   Patient presents with   • Hypertension        78-year-old  white female nonsmoker comes in for follow-up.  She has a history of thyroid cancer status post resection and also right breast cancer.  Her oncologist moved and she did not get her annual mammogram, so I will order that.  I also asked her to call the consultants and blood disorders and cancer to get a follow-up appointment.    She has not had blood work done for her diabetes or high blood pressure so I also will request that.  He tells me she is doing well without any complaints but she did find physical therapy for her frozen shoulder to be painful.         The following portions of the patient's history were reviewed and updated as appropriate: current medications, past family history, past medical history, past social history, past surgical history and problem list.    Review of Systems   All other systems reviewed and are negative.      Objective   Physical Exam   Constitutional: She is oriented to person, place, and time. She appears well-developed and well-nourished.   HENT:   Head: Normocephalic and atraumatic.   Eyes: EOM are normal. Pupils are equal, round, and reactive to light.   Neck: Normal range of motion. Neck supple.   Cardiovascular: Normal rate and regular rhythm.   Pulmonary/Chest: Effort normal and breath sounds normal.   Musculoskeletal: Normal range of motion.   Neurological: She is alert and oriented to person, place, and time.   Skin: Skin is warm and dry.   Psychiatric: She has a normal mood and affect. Her behavior is normal.   Nursing note and vitals reviewed.      Assessment/Plan   Tiff was seen today for hypertension.    Diagnoses and all orders for this visit:    Essential hypertension  -     Comprehensive Metabolic Panel  -     TSH    Uncontrolled type 2 diabetes mellitus without complication, without long-term current use of  insulin (CMS/HCC)  -     Comprehensive Metabolic Panel  -     Hemoglobin A1c    Hypothyroidism (acquired)  -     TSH    Malignant neoplasm of upper-outer quadrant of right breast in female, estrogen receptor positive (CMS/HCC)  -     Mammo Screening Bilateral With CAD    Encounter for screening mammogram for malignant neoplasm of breast   -     Mammo Screening Bilateral With CAD    Other orders  -     exemestane (AROMASIN) 25 MG chemo tablet; Take 1 tablet by mouth Daily.                   I would like him to return for another visit in 6 month(s)

## 2019-01-16 NOTE — PATIENT INSTRUCTIONS
This is an extremely nice 78-year-old who is here for follow-up.  Since her oncologist has moved, I will request a follow-up mammogram.

## 2019-01-17 LAB
ALBUMIN SERPL-MCNC: 4.3 G/DL (ref 3.5–5.2)
ALBUMIN/GLOB SERPL: 1.4 G/DL
ALP SERPL-CCNC: 87 U/L (ref 39–117)
ALT SERPL-CCNC: 12 U/L (ref 1–33)
AST SERPL-CCNC: 11 U/L (ref 1–32)
BILIRUB SERPL-MCNC: 0.3 MG/DL (ref 0.1–1.2)
BUN SERPL-MCNC: 26 MG/DL (ref 8–23)
BUN/CREAT SERPL: 27.4 (ref 7–25)
CALCIUM SERPL-MCNC: 10.3 MG/DL (ref 8.6–10.5)
CHLORIDE SERPL-SCNC: 98 MMOL/L (ref 98–107)
CO2 SERPL-SCNC: 29.5 MMOL/L (ref 22–29)
CREAT SERPL-MCNC: 0.95 MG/DL (ref 0.57–1)
GLOBULIN SER CALC-MCNC: 3.1 GM/DL
GLUCOSE SERPL-MCNC: 84 MG/DL (ref 65–99)
HBA1C MFR BLD: 5.68 % (ref 4.8–5.6)
POTASSIUM SERPL-SCNC: 3.6 MMOL/L (ref 3.5–5.2)
PROT SERPL-MCNC: 7.4 G/DL (ref 6–8.5)
SODIUM SERPL-SCNC: 142 MMOL/L (ref 136–145)
TSH SERPL DL<=0.005 MIU/L-ACNC: 0.04 MIU/ML (ref 0.27–4.2)

## 2019-01-18 NOTE — PROGRESS NOTES
She is slightly hyperthyroid, so the fatigue must be due to something else.  Does she need a sleep study?

## 2019-01-24 ENCOUNTER — HOSPITAL ENCOUNTER (OUTPATIENT)
Dept: MAMMOGRAPHY | Facility: HOSPITAL | Age: 79
Discharge: HOME | End: 2019-01-24
Admitting: FAMILY MEDICINE

## 2019-01-24 PROCEDURE — 77067 SCR MAMMO BI INCL CAD: CPT

## 2019-01-28 ENCOUNTER — TELEPHONE (OUTPATIENT)
Dept: FAMILY MEDICINE CLINIC | Facility: CLINIC | Age: 79
End: 2019-01-28

## 2019-01-28 DIAGNOSIS — R53.82 CHRONIC FATIGUE: Primary | ICD-10-CM

## 2019-02-01 ENCOUNTER — TELEPHONE (OUTPATIENT)
Dept: FAMILY MEDICINE CLINIC | Facility: CLINIC | Age: 79
End: 2019-02-01

## 2019-02-01 NOTE — TELEPHONE ENCOUNTER
Pt was sent info for sleep study.    She will NOT be filling out because she sleeps just fine. Please cancel the order. thanks

## 2019-02-05 ENCOUNTER — TRANSCRIBE ORDERS (OUTPATIENT)
Dept: ADMINISTRATIVE | Facility: HOSPITAL | Age: 79
End: 2019-02-05

## 2019-02-05 DIAGNOSIS — C73 THYROID CANCER (HCC): Primary | ICD-10-CM

## 2019-02-25 ENCOUNTER — HOSPITAL ENCOUNTER (OUTPATIENT)
Dept: ULTRASOUND IMAGING | Facility: HOSPITAL | Age: 79
Discharge: HOME | End: 2019-02-25
Admitting: OTOLARYNGOLOGY

## 2019-02-25 DIAGNOSIS — C73 THYROID CANCER (HCC): ICD-10-CM

## 2019-02-25 PROCEDURE — 76536 US EXAM OF HEAD AND NECK: CPT

## 2019-05-12 ENCOUNTER — APPOINTMENT (OUTPATIENT)
Dept: GENERAL RADIOLOGY | Facility: HOSPITAL | Age: 79
End: 2019-05-12

## 2019-05-12 ENCOUNTER — APPOINTMENT (OUTPATIENT)
Dept: MRI IMAGING | Facility: HOSPITAL | Age: 79
End: 2019-05-12

## 2019-05-12 ENCOUNTER — APPOINTMENT (OUTPATIENT)
Dept: CT IMAGING | Facility: HOSPITAL | Age: 79
End: 2019-05-12

## 2019-05-12 ENCOUNTER — HOSPITAL ENCOUNTER (OUTPATIENT)
Facility: HOSPITAL | Age: 79
Setting detail: OBSERVATION
Discharge: HOME | End: 2019-05-13
Attending: EMERGENCY MEDICINE | Admitting: INTERNAL MEDICINE

## 2019-05-12 DIAGNOSIS — G45.9 TIA (TRANSIENT ISCHEMIC ATTACK): Primary | ICD-10-CM

## 2019-05-12 DIAGNOSIS — I10 PRIMARY HYPERTENSION: ICD-10-CM

## 2019-05-12 DIAGNOSIS — D32.9 MENINGIOMA (HCC): ICD-10-CM

## 2019-05-12 LAB
ABO GROUP BLD: NORMAL
ALBUMIN SERPL-MCNC: 3.9 G/DL (ref 3.5–5.2)
ALBUMIN/GLOB SERPL: 1.4 G/DL
ALP SERPL-CCNC: 78 U/L (ref 39–117)
ALT SERPL W P-5'-P-CCNC: 9 U/L (ref 1–33)
ANION GAP SERPL CALCULATED.3IONS-SCNC: 12.8 MMOL/L
AST SERPL-CCNC: 15 U/L (ref 1–32)
BASOPHILS # BLD AUTO: 0.04 10*3/MM3 (ref 0–0.2)
BASOPHILS NFR BLD AUTO: 0.6 % (ref 0–1.5)
BILIRUB SERPL-MCNC: 0.4 MG/DL (ref 0.2–1.2)
BLD GP AB SCN SERPL QL: NEGATIVE
BUN BLD-MCNC: 10 MG/DL (ref 8–23)
BUN/CREAT SERPL: 12.8 (ref 7–25)
CALCIUM SPEC-SCNC: 9.1 MG/DL (ref 8.6–10.5)
CHLORIDE SERPL-SCNC: 104 MMOL/L (ref 98–107)
CO2 SERPL-SCNC: 23.2 MMOL/L (ref 22–29)
CREAT BLD-MCNC: 0.78 MG/DL (ref 0.57–1)
DEPRECATED RDW RBC AUTO: 38.9 FL (ref 37–54)
EOSINOPHIL # BLD AUTO: 0.1 10*3/MM3 (ref 0–0.4)
EOSINOPHIL NFR BLD AUTO: 1.5 % (ref 0.3–6.2)
ERYTHROCYTE [DISTWIDTH] IN BLOOD BY AUTOMATED COUNT: 12.1 % (ref 12.3–15.4)
GFR SERPL CREATININE-BSD FRML MDRD: 71 ML/MIN/1.73
GLOBULIN UR ELPH-MCNC: 2.8 GM/DL
GLUCOSE BLD-MCNC: 101 MG/DL (ref 65–99)
GLUCOSE BLDC GLUCOMTR-MCNC: 122 MG/DL (ref 70–130)
GLUCOSE BLDC GLUCOMTR-MCNC: 78 MG/DL (ref 70–130)
HCT VFR BLD AUTO: 41.8 % (ref 34–46.6)
HGB BLD-MCNC: 13.5 G/DL (ref 12–15.9)
IMM GRANULOCYTES # BLD AUTO: 0.02 10*3/MM3 (ref 0–0.05)
IMM GRANULOCYTES NFR BLD AUTO: 0.3 % (ref 0–0.5)
LYMPHOCYTES # BLD AUTO: 2.04 10*3/MM3 (ref 0.7–3.1)
LYMPHOCYTES NFR BLD AUTO: 31.5 % (ref 19.6–45.3)
MCH RBC QN AUTO: 28.3 PG (ref 26.6–33)
MCHC RBC AUTO-ENTMCNC: 32.3 G/DL (ref 31.5–35.7)
MCV RBC AUTO: 87.6 FL (ref 79–97)
MONOCYTES # BLD AUTO: 0.67 10*3/MM3 (ref 0.1–0.9)
MONOCYTES NFR BLD AUTO: 10.3 % (ref 5–12)
NEUTROPHILS # BLD AUTO: 3.61 10*3/MM3 (ref 1.7–7)
NEUTROPHILS NFR BLD AUTO: 55.8 % (ref 42.7–76)
NRBC BLD AUTO-RTO: 0 /100 WBC (ref 0–0.2)
PLATELET # BLD AUTO: 192 10*3/MM3 (ref 140–450)
PMV BLD AUTO: 10.7 FL (ref 6–12)
POTASSIUM BLD-SCNC: 3.6 MMOL/L (ref 3.5–5.2)
PROT SERPL-MCNC: 6.7 G/DL (ref 6–8.5)
RBC # BLD AUTO: 4.77 10*6/MM3 (ref 3.77–5.28)
RH BLD: POSITIVE
SODIUM BLD-SCNC: 140 MMOL/L (ref 136–145)
T&S EXPIRATION DATE: NORMAL
TROPONIN T SERPL-MCNC: <0.01 NG/ML (ref 0–0.03)
WBC NRBC COR # BLD: 6.48 10*3/MM3 (ref 3.4–10.8)

## 2019-05-12 PROCEDURE — 84484 ASSAY OF TROPONIN QUANT: CPT | Performed by: EMERGENCY MEDICINE

## 2019-05-12 PROCEDURE — 82962 GLUCOSE BLOOD TEST: CPT

## 2019-05-12 PROCEDURE — 70450 CT HEAD/BRAIN W/O DYE: CPT

## 2019-05-12 PROCEDURE — 96361 HYDRATE IV INFUSION ADD-ON: CPT

## 2019-05-12 PROCEDURE — 93010 ELECTROCARDIOGRAM REPORT: CPT | Performed by: INTERNAL MEDICINE

## 2019-05-12 PROCEDURE — 99203 OFFICE O/P NEW LOW 30 MIN: CPT | Performed by: PSYCHIATRY & NEUROLOGY

## 2019-05-12 PROCEDURE — 93005 ELECTROCARDIOGRAM TRACING: CPT | Performed by: EMERGENCY MEDICINE

## 2019-05-12 PROCEDURE — 96376 TX/PRO/DX INJ SAME DRUG ADON: CPT

## 2019-05-12 PROCEDURE — 96374 THER/PROPH/DIAG INJ IV PUSH: CPT

## 2019-05-12 PROCEDURE — 71046 X-RAY EXAM CHEST 2 VIEWS: CPT

## 2019-05-12 PROCEDURE — 0 GADOBENATE DIMEGLUMINE 529 MG/ML SOLUTION: Performed by: INTERNAL MEDICINE

## 2019-05-12 PROCEDURE — 70549 MR ANGIOGRAPH NECK W/O&W/DYE: CPT

## 2019-05-12 PROCEDURE — G0378 HOSPITAL OBSERVATION PER HR: HCPCS

## 2019-05-12 PROCEDURE — 86900 BLOOD TYPING SEROLOGIC ABO: CPT | Performed by: EMERGENCY MEDICINE

## 2019-05-12 PROCEDURE — 70544 MR ANGIOGRAPHY HEAD W/O DYE: CPT

## 2019-05-12 PROCEDURE — 80053 COMPREHEN METABOLIC PANEL: CPT | Performed by: EMERGENCY MEDICINE

## 2019-05-12 PROCEDURE — 85025 COMPLETE CBC W/AUTO DIFF WBC: CPT | Performed by: EMERGENCY MEDICINE

## 2019-05-12 PROCEDURE — 86901 BLOOD TYPING SEROLOGIC RH(D): CPT | Performed by: EMERGENCY MEDICINE

## 2019-05-12 PROCEDURE — A9577 INJ MULTIHANCE: HCPCS | Performed by: INTERNAL MEDICINE

## 2019-05-12 PROCEDURE — 70553 MRI BRAIN STEM W/O & W/DYE: CPT

## 2019-05-12 PROCEDURE — 86850 RBC ANTIBODY SCREEN: CPT | Performed by: EMERGENCY MEDICINE

## 2019-05-12 PROCEDURE — 99284 EMERGENCY DEPT VISIT MOD MDM: CPT

## 2019-05-12 RX ORDER — FUROSEMIDE 20 MG/1
20 TABLET ORAL DAILY
Status: DISCONTINUED | OUTPATIENT
Start: 2019-05-12 | End: 2019-05-12

## 2019-05-12 RX ORDER — TOBRAMYCIN 3 MG/ML
2 SOLUTION/ DROPS OPHTHALMIC EVERY 8 HOURS SCHEDULED
Status: DISCONTINUED | OUTPATIENT
Start: 2019-05-12 | End: 2019-05-13 | Stop reason: HOSPADM

## 2019-05-12 RX ORDER — NITROGLYCERIN 0.4 MG/1
0.4 TABLET SUBLINGUAL
Status: DISCONTINUED | OUTPATIENT
Start: 2019-05-12 | End: 2019-05-13 | Stop reason: HOSPADM

## 2019-05-12 RX ORDER — LABETALOL HYDROCHLORIDE 5 MG/ML
20 INJECTION, SOLUTION INTRAVENOUS EVERY 4 HOURS PRN
Status: DISCONTINUED | OUTPATIENT
Start: 2019-05-12 | End: 2019-05-13 | Stop reason: HOSPADM

## 2019-05-12 RX ORDER — DEXTROSE MONOHYDRATE 25 G/50ML
25 INJECTION, SOLUTION INTRAVENOUS
Status: DISCONTINUED | OUTPATIENT
Start: 2019-05-12 | End: 2019-05-13 | Stop reason: HOSPADM

## 2019-05-12 RX ORDER — ONDANSETRON 2 MG/ML
4 INJECTION INTRAMUSCULAR; INTRAVENOUS EVERY 6 HOURS PRN
Status: DISCONTINUED | OUTPATIENT
Start: 2019-05-12 | End: 2019-05-13 | Stop reason: HOSPADM

## 2019-05-12 RX ORDER — SODIUM CHLORIDE 9 MG/ML
75 INJECTION, SOLUTION INTRAVENOUS CONTINUOUS
Status: DISCONTINUED | OUTPATIENT
Start: 2019-05-12 | End: 2019-05-13

## 2019-05-12 RX ORDER — PANTOPRAZOLE SODIUM 40 MG/1
40 TABLET, DELAYED RELEASE ORAL EVERY MORNING
Status: DISCONTINUED | OUTPATIENT
Start: 2019-05-13 | End: 2019-05-13 | Stop reason: HOSPADM

## 2019-05-12 RX ORDER — BISACODYL 10 MG
10 SUPPOSITORY, RECTAL RECTAL DAILY PRN
Status: DISCONTINUED | OUTPATIENT
Start: 2019-05-12 | End: 2019-05-13 | Stop reason: HOSPADM

## 2019-05-12 RX ORDER — NICOTINE POLACRILEX 4 MG
15 LOZENGE BUCCAL
Status: DISCONTINUED | OUTPATIENT
Start: 2019-05-12 | End: 2019-05-13 | Stop reason: HOSPADM

## 2019-05-12 RX ORDER — LABETALOL HYDROCHLORIDE 5 MG/ML
10 INJECTION, SOLUTION INTRAVENOUS ONCE
Status: COMPLETED | OUTPATIENT
Start: 2019-05-12 | End: 2019-05-12

## 2019-05-12 RX ORDER — ASPIRIN 81 MG/1
324 TABLET, CHEWABLE ORAL ONCE
Status: COMPLETED | OUTPATIENT
Start: 2019-05-12 | End: 2019-05-12

## 2019-05-12 RX ORDER — ATORVASTATIN CALCIUM 80 MG/1
80 TABLET, FILM COATED ORAL NIGHTLY
Status: DISCONTINUED | OUTPATIENT
Start: 2019-05-12 | End: 2019-05-13 | Stop reason: HOSPADM

## 2019-05-12 RX ORDER — SODIUM CHLORIDE 0.9 % (FLUSH) 0.9 %
3-10 SYRINGE (ML) INJECTION AS NEEDED
Status: DISCONTINUED | OUTPATIENT
Start: 2019-05-12 | End: 2019-05-13 | Stop reason: HOSPADM

## 2019-05-12 RX ORDER — METOPROLOL SUCCINATE 100 MG/1
200 TABLET, EXTENDED RELEASE ORAL DAILY
Status: DISCONTINUED | OUTPATIENT
Start: 2019-05-12 | End: 2019-05-13 | Stop reason: HOSPADM

## 2019-05-12 RX ORDER — LEVOTHYROXINE SODIUM 112 UG/1
112 TABLET ORAL
Status: DISCONTINUED | OUTPATIENT
Start: 2019-05-13 | End: 2019-05-13

## 2019-05-12 RX ORDER — ONDANSETRON 4 MG/1
4 TABLET, FILM COATED ORAL EVERY 6 HOURS PRN
Status: DISCONTINUED | OUTPATIENT
Start: 2019-05-12 | End: 2019-05-13 | Stop reason: HOSPADM

## 2019-05-12 RX ORDER — SODIUM CHLORIDE 0.9 % (FLUSH) 0.9 %
3 SYRINGE (ML) INJECTION EVERY 12 HOURS SCHEDULED
Status: DISCONTINUED | OUTPATIENT
Start: 2019-05-12 | End: 2019-05-13 | Stop reason: HOSPADM

## 2019-05-12 RX ORDER — ACETAMINOPHEN 325 MG/1
650 TABLET ORAL EVERY 4 HOURS PRN
Status: DISCONTINUED | OUTPATIENT
Start: 2019-05-12 | End: 2019-05-13 | Stop reason: HOSPADM

## 2019-05-12 RX ORDER — ASPIRIN 300 MG/1
300 SUPPOSITORY RECTAL DAILY
Status: DISCONTINUED | OUTPATIENT
Start: 2019-05-12 | End: 2019-05-13

## 2019-05-12 RX ORDER — SODIUM CHLORIDE 0.9 % (FLUSH) 0.9 %
10 SYRINGE (ML) INJECTION AS NEEDED
Status: DISCONTINUED | OUTPATIENT
Start: 2019-05-12 | End: 2019-05-13 | Stop reason: HOSPADM

## 2019-05-12 RX ORDER — ASPIRIN 325 MG
325 TABLET ORAL DAILY
Status: DISCONTINUED | OUTPATIENT
Start: 2019-05-12 | End: 2019-05-13

## 2019-05-12 RX ADMIN — SODIUM CHLORIDE, PRESERVATIVE FREE 3 ML: 5 INJECTION INTRAVENOUS at 21:03

## 2019-05-12 RX ADMIN — ATORVASTATIN CALCIUM 80 MG: 80 TABLET, FILM COATED ORAL at 20:59

## 2019-05-12 RX ADMIN — ASPIRIN 325 MG: 325 TABLET ORAL at 17:59

## 2019-05-12 RX ADMIN — METOPROLOL SUCCINATE 200 MG: 100 TABLET, FILM COATED, EXTENDED RELEASE ORAL at 17:59

## 2019-05-12 RX ADMIN — GADOBENATE DIMEGLUMINE 16 ML: 529 INJECTION, SOLUTION INTRAVENOUS at 20:01

## 2019-05-12 RX ADMIN — SODIUM CHLORIDE 75 ML/HR: 9 INJECTION, SOLUTION INTRAVENOUS at 21:00

## 2019-05-12 RX ADMIN — LABETALOL 20 MG/4 ML (5 MG/ML) INTRAVENOUS SYRINGE 20 MG: at 23:20

## 2019-05-12 RX ADMIN — LABETALOL 20 MG/4 ML (5 MG/ML) INTRAVENOUS SYRINGE 10 MG: at 11:15

## 2019-05-12 RX ADMIN — ASPIRIN 324 MG: 81 TABLET, CHEWABLE ORAL at 12:30

## 2019-05-12 NOTE — H&P
"    Patient Name:  Tiff Del Rio  YOB: 1940  MRN:  [id number]  Admit Date:  5/12/2019  Patient Care Team:  Christian Dorsey MD as PCP - General  HoustonPaddy chauhan MD as PCP - Claims Attributed  Anabella Chase MD as Consulting Physician (Hematology and Oncology)  Tereso Gonzalez MD as Referring Physician (Breast Surgery)      Subjective   History Present Illness     Chief Complaint   Patient presents with   • Speech Problem     Pt states that at 0945 she started having difficulty carrying on a conversation. States that the words would not come out correctly.      HPI  Ms. Del Rio is a 78 y.o. with a history of hemorrhagic stroke, diabetes, hypertension and hypothyroidism who presents to [facility] complaining of difficulty speaking.  She and her  are on their way to [place] this morning around 0945 when she just \"did not feel right and could not find her words\".  Her  states speech sounded normal but it was taking her a while to get the words out and she had a slight right eye droop.  She attempted to converse with a friend at [place] and was unable to do so so they decided to come to the ER. She also had complaints of slight HA. Symptoms lasted about 30 minutes.  She denies focal weakness, vision changes, numbness, ataxia, chest pain, palpitations, dizziness, lightheadedness, nausea, SOA and edema.      Review of Systems   Constitutional: Negative for chills and fever.   HENT: Negative for congestion and dental problem.    Eyes: Positive for discharge (clear), redness (secondary to allergies) and itching.   Respiratory: Negative for chest tightness and shortness of breath.    Cardiovascular: Negative for chest pain, palpitations and leg swelling.   Gastrointestinal: Negative for abdominal distention, abdominal pain and nausea.   Endocrine: Negative for cold intolerance and heat intolerance.   Genitourinary: Negative for difficulty urinating and dysuria. "   Musculoskeletal: Negative for arthralgias and gait problem.   Skin: Negative for color change and pallor.   Allergic/Immunologic: Positive for environmental allergies. Negative for food allergies.   Neurological: Positive for facial asymmetry (right eye droop, resolved) and headaches.   Hematological: Negative for adenopathy. Does not bruise/bleed easily.   Psychiatric/Behavioral: Negative for agitation and behavioral problems.        Personal History     Past Medical History:   Diagnosis Date   • Breast cancer (CMS/HCC)    • Diabetes mellitus (CMS/HCC)    • Osteoporosis    • Stroke (CMS/HCC)     Hemorrhagic   • Stroke, hemorrhagic (CMS/HCC) 2004   • Thyroid cancer (CMS/HCC)      Past Surgical History:   Procedure Laterality Date   • BREAST BIOPSY Right 10/2014   • BREAST SURGERY  2014    Right mastectomy   • EYE SURGERY      Cataract 1995 and 2001   • HYSTERECTOMY  1972    With oophorectomy   • MASTECTOMY Right 10/2014   • THYROIDECTOMY  2010     Family History   Problem Relation Age of Onset   • Hypertension Mother    • Stroke Mother    • Hypertension Father    • Vision loss Father    • Cancer Maternal Grandmother 43        Uteran cancer     Social History     Tobacco Use   • Smoking status: Never Smoker   • Smokeless tobacco: Never Used   Substance Use Topics   • Alcohol use: Yes     Types: 1 Glasses of wine per week     Comment: Occasional   • Drug use: No     Medications Prior to Admission   Medication Sig Dispense Refill Last Dose   • exemestane (AROMASIN) 25 MG chemo tablet Take 1 tablet by mouth Daily. 90 tablet 0 5/11/2019 at Unknown time   • furosemide (LASIX) 20 MG tablet Take 1 tablet by mouth Daily. 90 tablet 1 5/11/2019 at Unknown time   • levothyroxine (SYNTHROID, LEVOTHROID) 112 MCG tablet TAKE ONE TABLET BY MOUTH DAILY 90 tablet 3 5/11/2019 at Unknown time   • metFORMIN (GLUCOPHAGE) 500 MG tablet Take 1 tablet by mouth Daily With Breakfast. 90 tablet 0 5/11/2019 at Unknown time   • metoprolol  succinate XL (TOPROL-XL) 200 MG 24 hr tablet Take 1 tablet by mouth Daily. 90 tablet 0 5/11/2019 at Unknown time   • olmesartan-hydrochlorothiazide (BENICAR HCT) 40-12.5 MG per tablet Take 1 tablet by mouth Daily. 90 tablet 0 5/11/2019 at Unknown time   • omeprazole (priLOSEC) 40 MG capsule    More than a month at Unknown time   • tobramycin (TOBREX) 0.3 % solution ophthalmic solution Administer 2 drops to the right eye Every 8 (Eight) Hours. 5 mL 0 More than a month at Unknown time     Allergies:    Allergies   Allergen Reactions   • Codeine Phosphate GI Intolerance       Objective    Objective     Vital Signs  Temp:  [97.5 °F (36.4 °C)-98.2 °F (36.8 °C)] 97.5 °F (36.4 °C)  Heart Rate:  [] 75  Resp:  [16-18] 18  BP: (182-213)/() 182/100  SpO2:  [94 %-99 %] 98 %  on   ;   Device (Oxygen Therapy): room air  Body mass index is 31.64 kg/m².    Physical Exam   Constitutional: She is oriented to person, place, and time. She appears well-developed and well-nourished. No distress.   HENT:   Head: Normocephalic and atraumatic.   Eyes: Conjunctivae and EOM are normal. Right eye exhibits discharge (clear, allergies).   Neck: Normal range of motion. Neck supple.   Cardiovascular: Normal rate and regular rhythm.   Pulmonary/Chest: Effort normal and breath sounds normal. No respiratory distress.   Abdominal: Soft. Bowel sounds are normal. She exhibits no distension. There is no tenderness.   Musculoskeletal: Normal range of motion. She exhibits edema (trace, pedal).   Neurological: She is alert and oriented to person, place, and time. No cranial nerve deficit.   Skin: Skin is warm and dry.   Psychiatric: She has a normal mood and affect. Her behavior is normal.   Nursing note and vitals reviewed.      Results Review:  I reviewed the patient's new clinical results.  I reviewed the patient's new imaging results and agree with the interpretation.  I reviewed the patient's other test results and agree with the  interpretation  I personally viewed and interpreted the patient's EKG/Telemetry data  Discussed with ED provider.    Lab Results (last 24 hours)     Procedure Component Value Units Date/Time    CBC & Differential [[id number]] Collected:  05/12/19 1107    Specimen:  Blood Updated:  05/12/19 1128    Narrative:       The following orders were created for panel order CBC & Differential.  Procedure                               Abnormality         Status                     ---------                               -----------         ------                     CBC Auto Differential[[id number]]        Abnormal            Final result                 Please view results for these tests on the individual orders.    Comprehensive Metabolic Panel [[id number]]  (Abnormal) Collected:  05/12/19 1107    Specimen:  Blood Updated:  05/12/19 1144     Glucose 101 mg/dL      BUN 10 mg/dL      Creatinine 0.78 mg/dL      Sodium 140 mmol/L      Potassium 3.6 mmol/L      Chloride 104 mmol/L      CO2 23.2 mmol/L      Calcium 9.1 mg/dL      Total Protein 6.7 g/dL      Albumin 3.90 g/dL      ALT (SGPT) 9 U/L      AST (SGOT) 15 U/L      Alkaline Phosphatase 78 U/L      Total Bilirubin 0.4 mg/dL      eGFR Non African Amer 71 mL/min/1.73      Globulin 2.8 gm/dL      A/G Ratio 1.4 g/dL      BUN/Creatinine Ratio 12.8     Anion Gap 12.8 mmol/L     Narrative:       GFR Normal >60  Chronic Kidney Disease <60  Kidney Failure <15    Troponin [[id number]]  (Normal) Collected:  05/12/19 1107    Specimen:  Blood Updated:  05/12/19 1144     Troponin T <0.010 ng/mL     Narrative:       Troponin T Reference Range:  <= 0.03 ng/mL-   Negative for AMI  >0.03 ng/mL-     Abnormal for myocardial necrosis.  Clinicians would have to utilize clinical acumen, EKG, Troponin and serial changes to determine if it is an Acute Myocardial Infarction or myocardial injury due to an underlying chronic condition.     CBC Auto Differential [[id number]]  (Abnormal) Collected:   05/12/19 1107    Specimen:  Blood Updated:  05/12/19 1128     WBC 6.48 10*3/mm3      RBC 4.77 10*6/mm3      Hemoglobin 13.5 g/dL      Hematocrit 41.8 %      MCV 87.6 fL      MCH 28.3 pg      MCHC 32.3 g/dL      RDW 12.1 %      RDW-SD 38.9 fl      MPV 10.7 fL      Platelets 192 10*3/mm3      Neutrophil % 55.8 %      Lymphocyte % 31.5 %      Monocyte % 10.3 %      Eosinophil % 1.5 %      Basophil % 0.6 %      Immature Grans % 0.3 %      Neutrophils, Absolute 3.61 10*3/mm3      Lymphocytes, Absolute 2.04 10*3/mm3      Monocytes, Absolute 0.67 10*3/mm3      Eosinophils, Absolute 0.10 10*3/mm3      Basophils, Absolute 0.04 10*3/mm3      Immature Grans, Absolute 0.02 10*3/mm3      nRBC 0.0 /100 WBC           Imaging Results (last 24 hours)     Procedure Component Value Units Date/Time    XR Chest 2 View [[id number]] Collected:  05/12/19 1218     Updated:  05/12/19 1222    Narrative:       XR CHEST 2 VW-     HISTORY: Female who is 78 years-old,  TIA     TECHNIQUE: Frontal and lateral views of the chest     COMPARISON: 04/03/2016     FINDINGS: Heart size is normal. Aorta is tortuous. Pulmonary vasculature  is unremarkable.  No focal pulmonary consolidation, pleural effusion, or  pneumothorax. No acute osseous process.       Impression:       No evidence for acute pulmonary process. Tortuous aorta.  Follow-up as clinically indicated.     This report was finalized on 5/12/2019 12:19 PM by Dr. Mac Cruz M.D.       CT Head Without Contrast [[id number]] Collected:  05/12/19 1214     Updated:  05/12/19 1221    Narrative:       CT HEAD WO CONTRAST-     INDICATIONS: TIA     TECHNIQUE: Radiation dose reduction techniques were utilized, including  automated exposure control and exposure modulation based on body size.  Noncontrast head CT     COMPARISON: 05/01/2015 MRI     FINDINGS:      A dural based density measuring 1.1 cm, laterally adjacent to the right  frontal lobe on image 24 series 1, suggests meningioma, and does  not  appear significantly changed from the prior exam, taking into account  differences between modalities (extent of the dural tail is better  demonstrated on the prior unenhanced exam).     No acute intracranial hemorrhage, midline shift or mass effect. No acute  territorial infarct is identified. Thinning of the pituitary is noted.     Mild periventricular hypodensities suggest chronic small vessel ischemic  change in a patient this age.           Ventricles, cisterns, cerebral sulci are unremarkable for patient age.     The visualized paranasal sinuses, orbits, mastoid air cells are  unremarkable.                   Impression:          No acute intracranial hemorrhage or hydrocephalus. If there is further  clinical concern, MRI could be considered for further evaluation.     This report was finalized on 5/12/2019 12:18 PM by Dr. Mac Cruz M.D.                  ECG 12 Lead   Preliminary Result   HEART RATE= 87  bpm   RR Interval= 688  ms   WV Interval= 191  ms   P Horizontal Axis= 41  deg   P Front Axis= 46  deg   QRSD Interval= 88  ms   QT Interval= 372  ms   QRS Axis= -25  deg   T Wave Axis= 42  deg   - ABNORMAL ECG -   Sinus rhythm   LVH by voltage   Inferior infarct, old   Anterior Q waves, possibly due to LVH   Electronically Signed By:    Date and Time of Study: 2019-05-12 11:10:41           Assessment/Plan     Active Hospital Problems    Diagnosis POA   • **TIA (transient ischemic attack) [G45.9] Yes   • Hypothyroidism (acquired) [E03.9] Yes   • Essential hypertension [I10] Yes   • Uncontrolled type 2 diabetes mellitus without complication, without long-term current use of insulin (CMS/[facility]) [E11.65] Yes     · TIA-initiate stroke protocol. CT  head negative. Neurology has been consulted and will follow their recommendations.    · HTN-hold all blood pressure medication for now. Call if blood pressure >220/110.  · DM 2- hold oral hypoglycemics and initiate low-dose external factor. will check  A1c.  · I discussed the patients findings and my recommendations with patient, family and nursing staff.    VTE Prophylaxis - SCDs .  Code Status - Full code.       BROOKLYN Ignacio  [facility]  05/12/19  3:07 PM    Addendum:  I have personally interviewed and examined the patient and agree with the above note.  Patient with a history of HTN, Type 2 DM, and remote CVA with no residual deficits presenting with an episode of expressive aphasia which has since resolved.  Her neurologic exam shows no focal deficits.  Lungs CTAB and heart RRR.  Start on ASA and lipitor. Hold losartan/HCTZ and lasix for now, continue on metoprolol.  No further antihypertensive Tx unless systolic BP is greater than 220mmHg.  Neurology consulted.  Proceed with MRI/MRA of the head and neck as well as echocardiogram with bubble study.  Gently hydrate.  Check Lipids, B12, TSH, A1C.     Filippo Jim MD  [facility]  4:00 PM

## 2019-05-12 NOTE — PLAN OF CARE
Problem: Fall Risk (Adult)  Goal: Identify Related Risk Factors and Signs and Symptoms  Outcome: Ongoing (interventions implemented as appropriate)    Goal: Absence of Fall  Outcome: Ongoing (interventions implemented as appropriate)      Problem: Patient Care Overview  Goal: Plan of Care Review  Outcome: Ongoing (interventions implemented as appropriate)   05/12/19 4028   Coping/Psychosocial   Plan of Care Reviewed With patient   Plan of Care Review   Progress improving   OTHER   Outcome Summary Pt AOx4, VSS, denies pain. BP remains  high, Dr. Jim stated that SBP parameters remain to call if greater than 220. Pt tolerating regular diet well. NIH 0. Pt to complete MRI and Echo. Up ad joi in room. Will continue to monitor.

## 2019-05-12 NOTE — ED PROVIDER NOTES
" EMERGENCY DEPARTMENT ENCOUNTER    Room Number:  14/14  Date seen:  5/12/2019  Time seen: 10:51 AM  PCP: Christian Dorsey MD  Historian: patient, spouse      HPI:  Chief Complaint: speech difficulties    Context: Tiff Del Rio is a 78 y.o. female who presents to the ED c/o trouble talking that began around 0945 today while driving with her spouse. Pt states that she had  trouble \"getting the words out\"and that she felt like she \"would start in the middle of a sentence\". Pt's spouse states that the pt sounded confused but that her speech returned to baseline after about 30 minutes. Pt denies focal weakness, drooling, numbness or vision changes. Pt states that she has a history of hemorrhagic CVA.    Quality: trouble saying the correct word  Intensity/Severity: moderate  Duration: 30 minutes  Onset quality: sudden  Timing: constant  Progression: resolved  Aggravating Factors: none  Alleviating Factors: none  Previous Episodes: none  Treatment before arrival: none  Associated Symptoms: none    PAST MEDICAL HISTORY  Active Ambulatory Problems     Diagnosis Date Noted   • Essential hypertension 04/04/2016   • Uncontrolled type 2 diabetes mellitus without complication, without long-term current use of insulin (CMS/HCC) 04/04/2016   • Malignant neoplasm of upper-outer quadrant of right breast in female, estrogen receptor positive (CMS/HCC) 05/23/2016   • Osteoporosis 05/23/2016   • Hypothyroidism (acquired) 04/07/2017   • Contusion of knee and lower leg, left, subsequent encounter 05/21/2018     Resolved Ambulatory Problems     Diagnosis Date Noted   • No Resolved Ambulatory Problems     Past Medical History:   Diagnosis Date   • Breast cancer (CMS/HCC)    • Diabetes mellitus (CMS/HCC)    • Osteoporosis    • Stroke (CMS/HCC)    • Stroke, hemorrhagic (CMS/HCC) 2004   • Thyroid cancer (CMS/HCC)          PAST SURGICAL HISTORY  Past Surgical History:   Procedure Laterality Date   • BREAST BIOPSY Right 10/2014   • BREAST " SURGERY  2014    Right mastectomy   • EYE SURGERY      Cataract 1995 and 2001   • HYSTERECTOMY  1972    With oophorectomy   • MASTECTOMY Right 10/2014   • THYROIDECTOMY  2010         FAMILY HISTORY  Family History   Problem Relation Age of Onset   • Hypertension Mother    • Stroke Mother    • Hypertension Father    • Vision loss Father    • Cancer Maternal Grandmother 43        Uteran cancer         SOCIAL HISTORY  Social History     Socioeconomic History   • Marital status:      Spouse name: Not on file   • Number of children: Not on file   • Years of education: Not on file   • Highest education level: Not on file   Occupational History   • Occupation: Homemaker   Tobacco Use   • Smoking status: Never Smoker   • Smokeless tobacco: Never Used   Substance and Sexual Activity   • Alcohol use: Yes     Types: 1 Glasses of wine per week     Comment: Occasional   • Drug use: No   • Sexual activity: Defer         ALLERGIES  Codeine phosphate        REVIEW OF SYSTEMS  Review of Systems   Constitutional: Negative for fever.   HENT: Negative for sore throat.    Respiratory: Negative for shortness of breath.    Cardiovascular: Negative for chest pain.   Gastrointestinal: Negative for abdominal pain.   Endocrine: Negative for polyuria.   Genitourinary: Negative for dysuria.   Musculoskeletal: Negative for neck pain.   Skin: Negative for rash.   Neurological: Positive for speech difficulty. Negative for headaches.   All other systems reviewed and are negative.           PHYSICAL EXAM  ED Triage Vitals   Temp Heart Rate Resp BP SpO2   05/12/19 1030 05/12/19 1030 05/12/19 1030 05/12/19 1043 05/12/19 1030   98.2 °F (36.8 °C) 111 18 (!) 213/100 95 %      Temp src Heart Rate Source Patient Position BP Location FiO2 (%)   05/12/19 1030 05/12/19 1030 -- -- --   Tympanic Monitor            GENERAL: not distressed  HENT: nares patent  EYES: no scleral icterus  CV: regular rhythm, regular rate  RESPIRATORY: normal effort  ABDOMEN:  soft, nontender  MUSCULOSKELETAL: no deformity  NEURO: alert, BURKETT, FC    Mental status  LOC: awake, alert and fully oriented to person, place, time, and situation.  Attention and memory intact. Fund of knowledge normal for age and education.  Language is fluent with normal naming, comprehension, and repetition.   There is no neglect.    Cranial nerves  PERRL  Visual fields full to confrontation.  EOMI with full versions, normal pursuits, and saccades.   Facial strength is full and symmetric.   Facial sensation is intact in V1-V3.  Hearing is intact to finger rub bilaterally.   Tongue protrudes midline.   Uvula and palate elevate symmetrically.  Speech is clear without notable labial, lingual, or guttural dysarthria.   Shoulder shrug and sternocleidomastoid activation are full and symmetric.    Motor  Normal bulk and tone.   Strength is 5/5 in bilateral upper and lower extremities.     There is no pronator drift.    Sensory  Sensation is intact to light touch in bilateral upper and lower extremities.     Coordination  Normal rapid alternating movements.  Normal finger-nose-finger and heel-to-shin testing.    Station and gait  Gait deferred.     Reflexes  No meningismus.       NIHSS 0    SKIN: warm, dry    Vital signs and nursing notes reviewed.          LAB RESULTS  Recent Results (from the past 24 hour(s))   Comprehensive Metabolic Panel    Collection Time: 05/12/19 11:07 AM   Result Value Ref Range    Glucose 101 (H) 65 - 99 mg/dL    BUN 10 8 - 23 mg/dL    Creatinine 0.78 0.57 - 1.00 mg/dL    Sodium 140 136 - 145 mmol/L    Potassium 3.6 3.5 - 5.2 mmol/L    Chloride 104 98 - 107 mmol/L    CO2 23.2 22.0 - 29.0 mmol/L    Calcium 9.1 8.6 - 10.5 mg/dL    Total Protein 6.7 6.0 - 8.5 g/dL    Albumin 3.90 3.50 - 5.20 g/dL    ALT (SGPT) 9 1 - 33 U/L    AST (SGOT) 15 1 - 32 U/L    Alkaline Phosphatase 78 39 - 117 U/L    Total Bilirubin 0.4 0.2 - 1.2 mg/dL    eGFR Non African Amer 71 >60 mL/min/1.73    Globulin 2.8 gm/dL     A/G Ratio 1.4 g/dL    BUN/Creatinine Ratio 12.8 7.0 - 25.0    Anion Gap 12.8 mmol/L   Troponin    Collection Time: 05/12/19 11:07 AM   Result Value Ref Range    Troponin T <0.010 0.000 - 0.030 ng/mL   Type & Screen    Collection Time: 05/12/19 11:07 AM   Result Value Ref Range    ABO Type O     RH type Positive     Antibody Screen Negative     T&S Expiration Date 5/15/2019 11:59:59 PM    CBC Auto Differential    Collection Time: 05/12/19 11:07 AM   Result Value Ref Range    WBC 6.48 3.40 - 10.80 10*3/mm3    RBC 4.77 3.77 - 5.28 10*6/mm3    Hemoglobin 13.5 12.0 - 15.9 g/dL    Hematocrit 41.8 34.0 - 46.6 %    MCV 87.6 79.0 - 97.0 fL    MCH 28.3 26.6 - 33.0 pg    MCHC 32.3 31.5 - 35.7 g/dL    RDW 12.1 (L) 12.3 - 15.4 %    RDW-SD 38.9 37.0 - 54.0 fl    MPV 10.7 6.0 - 12.0 fL    Platelets 192 140 - 450 10*3/mm3    Neutrophil % 55.8 42.7 - 76.0 %    Lymphocyte % 31.5 19.6 - 45.3 %    Monocyte % 10.3 5.0 - 12.0 %    Eosinophil % 1.5 0.3 - 6.2 %    Basophil % 0.6 0.0 - 1.5 %    Immature Grans % 0.3 0.0 - 0.5 %    Neutrophils, Absolute 3.61 1.70 - 7.00 10*3/mm3    Lymphocytes, Absolute 2.04 0.70 - 3.10 10*3/mm3    Monocytes, Absolute 0.67 0.10 - 0.90 10*3/mm3    Eosinophils, Absolute 0.10 0.00 - 0.40 10*3/mm3    Basophils, Absolute 0.04 0.00 - 0.20 10*3/mm3    Immature Grans, Absolute 0.02 0.00 - 0.05 10*3/mm3    nRBC 0.0 0.0 - 0.2 /100 WBC       Ordered the above labs and reviewed the results.        RADIOLOGY  XR Chest 2 View   Final Result   No evidence for acute pulmonary process. Tortuous aorta.   Follow-up as clinically indicated.       This report was finalized on 5/12/2019 12:19 PM by Dr. Mac Cruz M.D.          CT Head Without Contrast   Final Result       No acute intracranial hemorrhage or hydrocephalus. If there is further   clinical concern, MRI could be considered for further evaluation.       This report was finalized on 5/12/2019 12:18 PM by Dr. Mac Cruz M.D.                    PROCEDURES  Procedures        EKG:           EKG time: 1110  Rhythm/Rate: NSR, 87  P waves and MI: normal  QRS, axis: LVH   ST and T waves: normal     Interpreted Contemporaneously by me, independently viewed  unchanged compared to prior on 10/16/14    MEDICATIONS GIVEN IN ER  Medications   sodium chloride 0.9 % flush 10 mL (not administered)   labetalol (NORMODYNE,TRANDATE) injection 10 mg (10 mg Intravenous Given 5/12/19 1115)   aspirin chewable tablet 324 mg (324 mg Oral Given 5/12/19 1230)         PROGRESS AND CONSULTS     1056- WN=371/100. Notified pt and family that I am concerned that the pt has had a TIA. Discussed the plan to order lab and imaging studies prior to likely admitting the pt for further evaluation. Pt understands and agrees with the plan, all questions answered.    1101- Ordered blood work, troponin, EKG, CXR and CT head for further evaluation.     1103- Discussed the pt's case with Dr. Santos (stroke neurology), who recommends ordering labetalol for the pt's HTN. He states that the pt will have to be admitted.    1104- Ordered labetalol for HTN.    1225- Ordered ASA for anti-coagulation. Placed call to [facility] for admission.    1230- Rechecked pt. Pt is resting comfortably. Notified pt of her lab and imaging results, including her negative CT Head, and of my discussion with Dr. Santos. Discussed the plan to admit the pt for further evaluation and treatment. Pt and family agree with the plan and all questions were addressed.    1247- Discussed the pt's case with Dr. Jim ([facility]), who agrees to admit the pt to a telemetry bed.    MEDICAL DECISION MAKING      MDM  Number of Diagnoses or Management Options  Meningioma (CMS/HCC):   Primary hypertension:   TIA (transient ischemic attack):   Diagnosis management comments: Patient presents with symptoms concerning for TIA.  She has an 8 score 0 and therefore TPA was not given.  There is an incidental suspected meningioma on her head CT.   Patient admitted for further TIA work-up.       Amount and/or Complexity of Data Reviewed  Clinical lab tests: ordered and reviewed (Troponin=<0.010)  Tests in the radiology section of CPT®: reviewed and ordered (CT Head shows nothing acute)  Tests in the medicine section of CPT®: ordered and reviewed (See EKG procedure note)  Decide to obtain previous medical records or to obtain history from someone other than the patient: yes  Obtain history from someone other than the patient: yes (spouse)  Discuss the patient with other providers: yes (Dr. Santos (neurology), Dr. Jim ([facility]))  Independent visualization of images, tracings, or specimens: yes (No intracranial hemorrhage)    Patient Progress  Patient progress: resolved       Pt is not a tPA candidate due to NIHSS=0      DIAGNOSIS  Final diagnoses:   TIA (transient ischemic attack)   Meningioma (CMS/HCC)   Primary hypertension         DISPOSITION  ADMISSION    Discussed treatment plan and reason for admission with pt/family and admitting physician.  Pt/family voiced understanding of the plan for admission for further testing/treatment as needed.       Latest Documented Vital Signs:  As of 12:49 PM  BP- (!) 204/103 HR- 60 Temp- 98.2 °F (36.8 °C) (Tympanic) O2 sat- 98%        --  Documentation assistance provided by gregorio Potts for Dr. Georgi MD.  Information recorded by the scribe was done at my direction and has been verified and validated by me.     Lashawn Potts  05/12/19 9338       Christian Laureano II, MD  05/12/19 9504

## 2019-05-12 NOTE — CONSULTS
Neurology Note    Patient:  Tiff Del Rio    YOB: 1940    REFERRING PHYSICIAN:  Filippo Jim MD    CHIEF COMPLAINT:    Difficulty with speech    HISTORY OF PRESENT ILLNESS:   The patient is a 78 y.o. female with DM, HTN, h/o ICH in the setting of HTN several years ago, admitted with episode of difficulty getting words out on the way to [place] this am, question of a slight right eye droop and a slight headache that resolved after 30 minutes. She is now back to baseline. BP in /100, NSR, , CTH negative. She got IV labetolol and ASA. She has been able to ea and get up without help, speech is back to normal.  Denies similar symptoms in the past.    Past Medical History:  Past Medical History:   Diagnosis Date   • Breast cancer (CMS/HCC)    • Diabetes mellitus (CMS/HCC)    • Osteoporosis    • Stroke (CMS/HCC)     Hemorrhagic   • Stroke, hemorrhagic (CMS/HCC) 2004   • Thyroid cancer (CMS/HCC)        Past Surgical History:  Past Surgical History:   Procedure Laterality Date   • BREAST BIOPSY Right 10/2014   • BREAST SURGERY  2014    Right mastectomy   • EYE SURGERY      Cataract 1995 and 2001   • HYSTERECTOMY  1972    With oophorectomy   • MASTECTOMY Right 10/2014   • THYROIDECTOMY  2010       Social History:   Social History     Socioeconomic History   • Marital status:      Spouse name: Not on file   • Number of children: Not on file   • Years of education: Not on file   • Highest education level: Not on file   Occupational History   • Occupation: Homemaker   Tobacco Use   • Smoking status: Never Smoker   • Smokeless tobacco: Never Used   Substance and Sexual Activity   • Alcohol use: Yes     Types: 1 Glasses of wine per week     Comment: Occasional   • Drug use: No   • Sexual activity: Defer        Family History:   Family History   Problem Relation Age of Onset   • Hypertension Mother    • Stroke Mother    • Hypertension Father    • Vision loss Father    • Cancer Maternal  Grandmother 43        Uteran cancer       Medications Prior to Admission:    Prior to Admission medications    Medication Sig Start Date End Date Taking? Authorizing Provider   exemestane (AROMASIN) 25 MG chemo tablet Take 1 tablet by mouth Daily. 1/16/19  Yes Christian Dorsey MD   furosemide (LASIX) 20 MG tablet Take 1 tablet by mouth Daily. 9/14/18  Yes Christian Dorsey MD   levothyroxine (SYNTHROID, LEVOTHROID) 112 MCG tablet TAKE ONE TABLET BY MOUTH DAILY 7/30/18  Yes Christian Dorsey MD   metFORMIN (GLUCOPHAGE) 500 MG tablet Take 1 tablet by mouth Daily With Breakfast. 9/19/18  Yes Vinicio Travis MD   metoprolol succinate XL (TOPROL-XL) 200 MG 24 hr tablet Take 1 tablet by mouth Daily. 9/19/18  Yes Vinicio Travis MD   olmesartan-hydrochlorothiazide (BENICAR HCT) 40-12.5 MG per tablet Take 1 tablet by mouth Daily. 9/19/18  Yes Vinicio Travis MD   omeprazole (priLOSEC) 40 MG capsule  11/21/17   ProviderDeo MD   tobramycin (TOBREX) 0.3 % solution ophthalmic solution Administer 2 drops to the right eye Every 8 (Eight) Hours. 4/7/17   Christian Dorsey MD   amLODIPine (NORVASC) 10 MG tablet Take 1 tablet by mouth Daily.  Patient not taking: Reported on 5/12/2019 9/19/18 5/12/19  Vinicio Travis MD       Allergies:  Codeine phosphate      Review of system  Review of Systems   Neurological: Positive for speech difficulty.   All other systems reviewed and are negative.      Vitals:    05/12/19 1416   BP: (!) 182/100   Pulse: 75   Resp: 18   Temp: 97.5 °F (36.4 °C)   SpO2: 98%       Physical exam  Physical Exam   Constitutional: She is oriented to person, place, and time. She appears well-developed and well-nourished.   HENT:   Head: Normocephalic and atraumatic.   Eyes: EOM are normal. Pupils are equal, round, and reactive to light.   Neck: Carotid bruit is not present.   Cardiovascular: Normal rate and regular rhythm.   Pulmonary/Chest: Effort normal.   Neurological: She is alert and  oriented to person, place, and time. She has normal strength and normal reflexes. No cranial nerve deficit or sensory deficit. She displays no Babinski's sign on the right side. She displays no Babinski's sign on the left side.   Psychiatric: She has a normal mood and affect. Her speech is normal and behavior is normal. Thought content normal. Cognition and memory are normal.         Lab Results   Component Value Date    WBC 6.48 05/12/2019    HGB 13.5 05/12/2019    HCT 41.8 05/12/2019    MCV 87.6 05/12/2019     05/12/2019     Lab Results   Component Value Date    GLUCOSE 101 (H) 05/12/2019    BUN 10 05/12/2019    CREATININE 0.78 05/12/2019    EGFRIFNONA 71 05/12/2019    EGFRIFAFRI 69 01/16/2019    BCR 12.8 05/12/2019    CO2 23.2 05/12/2019    CALCIUM 9.1 05/12/2019    PROTENTOTREF 7.4 01/16/2019    ALBUMIN 3.90 05/12/2019    LABIL2 1.4 01/16/2019    AST 15 05/12/2019    ALT 9 05/12/2019     ECG 12 Lead   Order: [id number]   Status:  Preliminary result   Visible to patient:  No (Not Released)   Next appt:  None      Narrative     HEART RATE= 87  bpm  RR Interval= 688  ms  IA Interval= 191  ms  P Horizontal Axis= 41  deg  P Front Axis= 46  deg  QRSD Interval= 88  ms  QT Interval= 372  ms  QRS Axis= -25  deg  T Wave Axis= 42  deg  - ABNORMAL ECG -  Sinus rhythm  LVH by voltage  Inferior infarct, old  Anterior Q waves, possibly due to LVH  Electronically Signed By:   Date and Time of Study: 2019-05-12 11:10:41      Specimen Collected: 05/12/19 11:10 Last Resulted: 05/12/19 11:10               Radiological Studies:    Xr Chest 2 View    Result Date: 5/12/2019  XR CHEST 2 VW-  HISTORY: Female who is 78 years-old,  TIA  TECHNIQUE: Frontal and lateral views of the chest  COMPARISON: 04/03/2016  FINDINGS: Heart size is normal. Aorta is tortuous. Pulmonary vasculature is unremarkable.  No focal pulmonary consolidation, pleural effusion, or pneumothorax. No acute osseous process.      No evidence for acute pulmonary  process. Tortuous aorta. Follow-up as clinically indicated.  This report was finalized on 5/12/2019 12:19 PM by Dr. Mac Cruz M.D.      Ct Head Without Contrast    Result Date: 5/12/2019  CT HEAD WO CONTRAST-  INDICATIONS: TIA  TECHNIQUE: Radiation dose reduction techniques were utilized, including automated exposure control and exposure modulation based on body size. Noncontrast head CT  COMPARISON: 05/01/2015 MRI  FINDINGS:   A dural based density measuring 1.1 cm, laterally adjacent to the right frontal lobe on image 24 series 1, suggests meningioma, and does not appear significantly changed from the prior exam, taking into account differences between modalities (extent of the dural tail is better demonstrated on the prior unenhanced exam).  No acute intracranial hemorrhage, midline shift or mass effect. No acute territorial infarct is identified. Thinning of the pituitary is noted.  Mild periventricular hypodensities suggest chronic small vessel ischemic change in a patient this age.    Ventricles, cisterns, cerebral sulci are unremarkable for patient age.  The visualized paranasal sinuses, orbits, mastoid air cells are unremarkable.           No acute intracranial hemorrhage or hydrocephalus. If there is further clinical concern, MRI could be considered for further evaluation.  This report was finalized on 5/12/2019 12:18 PM by Dr. Mac Cruz M.D.          During this visit the following were done:  Labs Reviewed [x]    Labs Ordered []    Radiology Reports Reviewed [x]    Radiology Ordered []    EKG, echo, and/or stress test reviewed [x]    EEG results reviewed  []    EEG reviewed and interpreted per myself   []    Discussed case with neurointerventionalist or neuroradiologist []    Referring Provider Records Reviewed []    ER Records Reviewed [x]    Hospital Records Reviewed [x]    History Obtained From Family []    Radiological images view and Interpreted per myself [x]    Case Discussed with  referring provider []     Decision to obtain and request outside records  []        Assessment and Plan     TIA, transient expressive aphasia in the setting of HTN, 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 Migraine with aura appears less likely.     - Observation on telemetry.   - ASA and statin.   - Keep SBP<200, DBP<110. Long term goal SBP<130, DBP<80.   - MRI/MRAs ordered.   - TTE.     Thanks,    Electronically signed by Jean Santos MD on 5/12/2019 at 5:07 PM

## 2019-05-13 ENCOUNTER — APPOINTMENT (OUTPATIENT)
Dept: CARDIOLOGY | Facility: HOSPITAL | Age: 79
End: 2019-05-13

## 2019-05-13 VITALS
BODY MASS INDEX: 31.54 KG/M2 | TEMPERATURE: 97.4 F | HEART RATE: 64 BPM | SYSTOLIC BLOOD PRESSURE: 209 MMHG | RESPIRATION RATE: 18 BRPM | DIASTOLIC BLOOD PRESSURE: 103 MMHG | HEIGHT: 63 IN | OXYGEN SATURATION: 93 % | WEIGHT: 178 LBS

## 2019-05-13 PROBLEM — E05.90 HYPERTHYROIDISM: Status: ACTIVE | Noted: 2019-05-13

## 2019-05-13 PROBLEM — E11.49 TYPE 2 DIABETES MELLITUS WITH NEUROLOGIC COMPLICATION (HCC): Status: ACTIVE | Noted: 2019-05-13

## 2019-05-13 PROBLEM — D51.9 B12 DEFICIENCY ANEMIA: Status: ACTIVE | Noted: 2019-05-13

## 2019-05-13 PROBLEM — E03.9 HYPOTHYROIDISM (ACQUIRED): Status: RESOLVED | Noted: 2017-04-07 | Resolved: 2019-05-13

## 2019-05-13 LAB
ALBUMIN SERPL-MCNC: 3.5 G/DL (ref 3.5–5.2)
ALBUMIN/GLOB SERPL: 1.6 G/DL
ALP SERPL-CCNC: 66 U/L (ref 39–117)
ALT SERPL W P-5'-P-CCNC: 10 U/L (ref 1–33)
ANION GAP SERPL CALCULATED.3IONS-SCNC: 9.8 MMOL/L
AORTIC DIMENSIONLESS INDEX: 0.8 (DI)
AST SERPL-CCNC: 14 U/L (ref 1–32)
BH CV ECHO MEAS - ACS: 2 CM
BH CV ECHO MEAS - AO MAX PG: 7 MMHG
BH CV ECHO MEAS - AO MEAN PG (FULL): 2 MMHG
BH CV ECHO MEAS - AO MEAN PG: 4 MMHG
BH CV ECHO MEAS - AO ROOT AREA (BSA CORRECTED): 1.6
BH CV ECHO MEAS - AO ROOT AREA: 7.1 CM^2
BH CV ECHO MEAS - AO ROOT DIAM: 3 CM
BH CV ECHO MEAS - AO V2 MAX: 127 CM/SEC
BH CV ECHO MEAS - AO V2 MEAN: 89.9 CM/SEC
BH CV ECHO MEAS - AO V2 VTI: 27.2 CM
BH CV ECHO MEAS - ASC AORTA: 3.6 CM
BH CV ECHO MEAS - AVA(I,A): 2.4 CM^2
BH CV ECHO MEAS - AVA(I,D): 2.4 CM^2
BH CV ECHO MEAS - BSA(HAYCOCK): 1.9 M^2
BH CV ECHO MEAS - BSA: 1.8 M^2
BH CV ECHO MEAS - BZI_BMI: 31.5 KILOGRAMS/M^2
BH CV ECHO MEAS - BZI_METRIC_HEIGHT: 160 CM
BH CV ECHO MEAS - BZI_METRIC_WEIGHT: 80.7 KG
BH CV ECHO MEAS - EDV(CUBED): 42.9 ML
BH CV ECHO MEAS - EDV(MOD-SP2): 97 ML
BH CV ECHO MEAS - EDV(MOD-SP4): 110 ML
BH CV ECHO MEAS - EDV(TEICH): 50.9 ML
BH CV ECHO MEAS - EF(CUBED): 63.6 %
BH CV ECHO MEAS - EF(MOD-BP): 68 %
BH CV ECHO MEAS - EF(MOD-SP2): 68 %
BH CV ECHO MEAS - EF(MOD-SP4): 67.3 %
BH CV ECHO MEAS - EF(TEICH): 56.1 %
BH CV ECHO MEAS - ESV(CUBED): 15.6 ML
BH CV ECHO MEAS - ESV(MOD-SP2): 31 ML
BH CV ECHO MEAS - ESV(MOD-SP4): 36 ML
BH CV ECHO MEAS - ESV(TEICH): 22.3 ML
BH CV ECHO MEAS - FS: 28.6 %
BH CV ECHO MEAS - IVS/LVPW: 0.92
BH CV ECHO MEAS - IVSD: 1.2 CM
BH CV ECHO MEAS - LAT PEAK E' VEL: 5.8 CM/SEC
BH CV ECHO MEAS - LV DIASTOLIC VOL/BSA (35-75): 59.8 ML/M^2
BH CV ECHO MEAS - LV MASS(C)D: 144.6 GRAMS
BH CV ECHO MEAS - LV MASS(C)DI: 78.6 GRAMS/M^2
BH CV ECHO MEAS - LV MEAN PG: 2 MMHG
BH CV ECHO MEAS - LV SYSTOLIC VOL/BSA (12-30): 19.6 ML/M^2
BH CV ECHO MEAS - LV V1 MAX: 81 CM/SEC
BH CV ECHO MEAS - LV V1 MEAN: 58.9 CM/SEC
BH CV ECHO MEAS - LV V1 VTI: 21.1 CM
BH CV ECHO MEAS - LVIDD: 3.5 CM
BH CV ECHO MEAS - LVIDS: 2.5 CM
BH CV ECHO MEAS - LVLD AP2: 7.1 CM
BH CV ECHO MEAS - LVLD AP4: 7.4 CM
BH CV ECHO MEAS - LVLS AP2: 6.1 CM
BH CV ECHO MEAS - LVLS AP4: 6.3 CM
BH CV ECHO MEAS - LVOT AREA (M): 3.1 CM^2
BH CV ECHO MEAS - LVOT AREA: 3.1 CM^2
BH CV ECHO MEAS - LVOT DIAM: 2 CM
BH CV ECHO MEAS - LVPWD: 1.3 CM
BH CV ECHO MEAS - MED PEAK E' VEL: 5.7 CM/SEC
BH CV ECHO MEAS - MV A DUR: 0.23 SEC
BH CV ECHO MEAS - MV A MAX VEL: 99.2 CM/SEC
BH CV ECHO MEAS - MV DEC SLOPE: 500 CM/SEC^2
BH CV ECHO MEAS - MV DEC TIME: 0.23 SEC
BH CV ECHO MEAS - MV E MAX VEL: 60.7 CM/SEC
BH CV ECHO MEAS - MV E/A: 0.61
BH CV ECHO MEAS - MV MEAN PG: 2 MMHG
BH CV ECHO MEAS - MV P1/2T MAX VEL: 95 CM/SEC
BH CV ECHO MEAS - MV P1/2T: 55.6 MSEC
BH CV ECHO MEAS - MV V2 MEAN: 69.2 CM/SEC
BH CV ECHO MEAS - MV V2 VTI: 29.3 CM
BH CV ECHO MEAS - MVA P1/2T LCG: 2.3 CM^2
BH CV ECHO MEAS - MVA(P1/2T): 4 CM^2
BH CV ECHO MEAS - MVA(VTI): 2.3 CM^2
BH CV ECHO MEAS - PA ACC SLOPE: 552 CM/SEC^2
BH CV ECHO MEAS - PA ACC TIME: 0.13 SEC
BH CV ECHO MEAS - PA MAX PG: 2.2 MMHG
BH CV ECHO MEAS - PA PR(ACCEL): 20.5 MMHG
BH CV ECHO MEAS - PA V2 MAX: 74 CM/SEC
BH CV ECHO MEAS - PULM A REVS DUR: 0.17 SEC
BH CV ECHO MEAS - PULM A REVS VEL: 37 CM/SEC
BH CV ECHO MEAS - PULM DIAS VEL: 25.2 CM/SEC
BH CV ECHO MEAS - PULM S/D: 1.3
BH CV ECHO MEAS - PULM SYS VEL: 31.6 CM/SEC
BH CV ECHO MEAS - QP/QS: 0.62
BH CV ECHO MEAS - RAP SYSTOLE: 3 MMHG
BH CV ECHO MEAS - RV MEAN PG: 1 MMHG
BH CV ECHO MEAS - RV V1 MEAN: 37.4 CM/SEC
BH CV ECHO MEAS - RV V1 VTI: 13 CM
BH CV ECHO MEAS - RVOT AREA: 3.1 CM^2
BH CV ECHO MEAS - RVOT DIAM: 2 CM
BH CV ECHO MEAS - RVSP: 27 MMHG
BH CV ECHO MEAS - SI(AO): 104.5 ML/M^2
BH CV ECHO MEAS - SI(CUBED): 14.8 ML/M^2
BH CV ECHO MEAS - SI(LVOT): 36 ML/M^2
BH CV ECHO MEAS - SI(MOD-SP2): 35.9 ML/M^2
BH CV ECHO MEAS - SI(MOD-SP4): 40.2 ML/M^2
BH CV ECHO MEAS - SI(TEICH): 15.5 ML/M^2
BH CV ECHO MEAS - SV(AO): 192.3 ML
BH CV ECHO MEAS - SV(CUBED): 27.3 ML
BH CV ECHO MEAS - SV(LVOT): 66.3 ML
BH CV ECHO MEAS - SV(MOD-SP2): 66 ML
BH CV ECHO MEAS - SV(MOD-SP4): 74 ML
BH CV ECHO MEAS - SV(RVOT): 40.8 ML
BH CV ECHO MEAS - SV(TEICH): 28.5 ML
BH CV ECHO MEAS - TAPSE (>1.6): 2.2 CM2
BH CV ECHO MEAS - TR MAX VEL: 244 CM/SEC
BH CV ECHO MEASUREMENTS AVERAGE E/E' RATIO: 10.56
BH CV XLRA - RV BASE: 2.9 CM
BH CV XLRA - RV LENGTH: 6.2 CM
BH CV XLRA - RV MID: 2.5 CM
BH CV XLRA - TDI S': 17.1 CM/SEC
BILIRUB SERPL-MCNC: 0.5 MG/DL (ref 0.2–1.2)
BUN BLD-MCNC: 10 MG/DL (ref 8–23)
BUN/CREAT SERPL: 14.3 (ref 7–25)
CALCIUM SPEC-SCNC: 8.4 MG/DL (ref 8.6–10.5)
CHLORIDE SERPL-SCNC: 106 MMOL/L (ref 98–107)
CHOLEST SERPL-MCNC: 141 MG/DL (ref 0–200)
CO2 SERPL-SCNC: 24.2 MMOL/L (ref 22–29)
CREAT BLD-MCNC: 0.7 MG/DL (ref 0.57–1)
DEPRECATED RDW RBC AUTO: 39.7 FL (ref 37–54)
ERYTHROCYTE [DISTWIDTH] IN BLOOD BY AUTOMATED COUNT: 12.3 % (ref 12.3–15.4)
GFR SERPL CREATININE-BSD FRML MDRD: 81 ML/MIN/1.73
GLOBULIN UR ELPH-MCNC: 2.2 GM/DL
GLUCOSE BLD-MCNC: 91 MG/DL (ref 65–99)
GLUCOSE BLDC GLUCOMTR-MCNC: 87 MG/DL (ref 70–130)
GLUCOSE BLDC GLUCOMTR-MCNC: 89 MG/DL (ref 70–130)
HBA1C MFR BLD: 5.36 % (ref 4.8–5.6)
HCT VFR BLD AUTO: 36.7 % (ref 34–46.6)
HDLC SERPL-MCNC: 31 MG/DL (ref 40–60)
HGB BLD-MCNC: 11.7 G/DL (ref 12–15.9)
LDLC SERPL CALC-MCNC: 76 MG/DL (ref 0–100)
LDLC/HDLC SERPL: 2.46 {RATIO}
LEFT ATRIUM VOLUME INDEX: 32 ML/M2
LV EF 2D ECHO EST: 68 %
MAXIMAL PREDICTED HEART RATE: 140 BPM
MCH RBC QN AUTO: 28.3 PG (ref 26.6–33)
MCHC RBC AUTO-ENTMCNC: 31.9 G/DL (ref 31.5–35.7)
MCV RBC AUTO: 88.6 FL (ref 79–97)
PLATELET # BLD AUTO: 183 10*3/MM3 (ref 140–450)
PMV BLD AUTO: 10.9 FL (ref 6–12)
POTASSIUM BLD-SCNC: 3.5 MMOL/L (ref 3.5–5.2)
PROT SERPL-MCNC: 5.7 G/DL (ref 6–8.5)
RBC # BLD AUTO: 4.14 10*6/MM3 (ref 3.77–5.28)
SODIUM BLD-SCNC: 140 MMOL/L (ref 136–145)
STRESS TARGET HR: 119 BPM
T4 FREE SERPL-MCNC: 1.42 NG/DL (ref 0.93–1.7)
TRIGL SERPL-MCNC: 168 MG/DL (ref 0–150)
TSH SERPL DL<=0.05 MIU/L-ACNC: 0.04 MIU/ML (ref 0.27–4.2)
VIT B12 BLD-MCNC: 172 PG/ML (ref 211–946)
VLDLC SERPL-MCNC: 33.6 MG/DL (ref 5–40)
WBC NRBC COR # BLD: 7.88 10*3/MM3 (ref 3.4–10.8)

## 2019-05-13 PROCEDURE — 80053 COMPREHEN METABOLIC PANEL: CPT | Performed by: NURSE PRACTITIONER

## 2019-05-13 PROCEDURE — 93306 TTE W/DOPPLER COMPLETE: CPT | Performed by: INTERNAL MEDICINE

## 2019-05-13 PROCEDURE — 83036 HEMOGLOBIN GLYCOSYLATED A1C: CPT | Performed by: NURSE PRACTITIONER

## 2019-05-13 PROCEDURE — 96372 THER/PROPH/DIAG INJ SC/IM: CPT

## 2019-05-13 PROCEDURE — 80061 LIPID PANEL: CPT | Performed by: NURSE PRACTITIONER

## 2019-05-13 PROCEDURE — G0378 HOSPITAL OBSERVATION PER HR: HCPCS

## 2019-05-13 PROCEDURE — 82962 GLUCOSE BLOOD TEST: CPT

## 2019-05-13 PROCEDURE — 93306 TTE W/DOPPLER COMPLETE: CPT

## 2019-05-13 PROCEDURE — 25010000002 PERFLUTREN (DEFINITY) 8.476 MG IN SODIUM CHLORIDE 0.9 % 10 ML INJECTION: Performed by: NURSE PRACTITIONER

## 2019-05-13 PROCEDURE — 0296T HC EXT ECG > 48HR TO 21 DAY RCRD W/CONECT INTL RCRD: CPT

## 2019-05-13 PROCEDURE — 96361 HYDRATE IV INFUSION ADD-ON: CPT

## 2019-05-13 PROCEDURE — 25010000002 CYANOCOBALAMIN PER 1000 MCG: Performed by: INTERNAL MEDICINE

## 2019-05-13 PROCEDURE — 84439 ASSAY OF FREE THYROXINE: CPT | Performed by: INTERNAL MEDICINE

## 2019-05-13 PROCEDURE — 82607 VITAMIN B-12: CPT | Performed by: NURSE PRACTITIONER

## 2019-05-13 PROCEDURE — 84443 ASSAY THYROID STIM HORMONE: CPT | Performed by: NURSE PRACTITIONER

## 2019-05-13 PROCEDURE — 85027 COMPLETE CBC AUTOMATED: CPT | Performed by: NURSE PRACTITIONER

## 2019-05-13 PROCEDURE — 99214 OFFICE O/P EST MOD 30 MIN: CPT | Performed by: NURSE PRACTITIONER

## 2019-05-13 RX ORDER — CYANOCOBALAMIN 1000 UG/ML
1000 INJECTION, SOLUTION INTRAMUSCULAR; SUBCUTANEOUS DAILY
Status: DISCONTINUED | OUTPATIENT
Start: 2019-05-13 | End: 2019-05-13 | Stop reason: HOSPADM

## 2019-05-13 RX ORDER — ASPIRIN 81 MG/1
81 TABLET, CHEWABLE ORAL DAILY
Status: DISCONTINUED | OUTPATIENT
Start: 2019-05-14 | End: 2019-05-13 | Stop reason: HOSPADM

## 2019-05-13 RX ORDER — EXEMESTANE 25 MG/1
25 TABLET ORAL DAILY
Status: DISCONTINUED | OUTPATIENT
Start: 2019-05-13 | End: 2019-05-13 | Stop reason: HOSPADM

## 2019-05-13 RX ORDER — CYANOCOBALAMIN 1000 UG/ML
1000 INJECTION, SOLUTION INTRAMUSCULAR; SUBCUTANEOUS WEEKLY
Qty: 3 ML | Refills: 0
Start: 2019-05-13 | End: 2019-05-20 | Stop reason: SDUPTHER

## 2019-05-13 RX ORDER — ASPIRIN 81 MG/1
81 TABLET, CHEWABLE ORAL DAILY
Start: 2019-05-14

## 2019-05-13 RX ORDER — LEVOTHYROXINE SODIUM 88 UG/1
88 TABLET ORAL DAILY
Qty: 30 TABLET | Refills: 0 | Status: SHIPPED | OUTPATIENT
Start: 2019-05-13

## 2019-05-13 RX ORDER — ATORVASTATIN CALCIUM 80 MG/1
80 TABLET, FILM COATED ORAL NIGHTLY
Qty: 30 TABLET | Refills: 0 | Status: SHIPPED | OUTPATIENT
Start: 2019-05-13

## 2019-05-13 RX ORDER — FUROSEMIDE 20 MG/1
20 TABLET ORAL DAILY
Status: DISCONTINUED | OUTPATIENT
Start: 2019-05-13 | End: 2019-05-13 | Stop reason: HOSPADM

## 2019-05-13 RX ADMIN — FUROSEMIDE 20 MG: 20 TABLET ORAL at 10:19

## 2019-05-13 RX ADMIN — PERFLUTREN 2 ML: 6.52 INJECTION, SUSPENSION INTRAVENOUS at 08:35

## 2019-05-13 RX ADMIN — TOBRAMYCIN 2 DROP: 3 SOLUTION/ DROPS OPHTHALMIC at 06:53

## 2019-05-13 RX ADMIN — CYANOCOBALAMIN 1000 MCG: 1000 INJECTION, SOLUTION INTRAMUSCULAR; SUBCUTANEOUS at 10:18

## 2019-05-13 RX ADMIN — LEVOTHYROXINE SODIUM 112 MCG: 112 TABLET ORAL at 06:52

## 2019-05-13 RX ADMIN — LOSARTAN POTASSIUM: 50 TABLET, FILM COATED ORAL at 10:19

## 2019-05-13 RX ADMIN — PANTOPRAZOLE SODIUM 40 MG: 40 TABLET, DELAYED RELEASE ORAL at 06:52

## 2019-05-13 RX ADMIN — ASPIRIN 325 MG: 325 TABLET ORAL at 09:07

## 2019-05-13 RX ADMIN — EXEMESTANE 25 MG: 25 TABLET ORAL at 09:09

## 2019-05-13 RX ADMIN — METOPROLOL SUCCINATE 200 MG: 100 TABLET, FILM COATED, EXTENDED RELEASE ORAL at 09:05

## 2019-05-13 RX ADMIN — SODIUM CHLORIDE, PRESERVATIVE FREE 3 ML: 5 INJECTION INTRAVENOUS at 09:08

## 2019-05-13 NOTE — PLAN OF CARE
Problem: Patient Care Overview  Goal: Plan of Care Review  Outcome: Ongoing (interventions implemented as appropriate)   05/13/19 0500   Coping/Psychosocial   Plan of Care Reviewed With patient   Plan of Care Review   Progress improving   OTHER   Outcome Summary No acute changes noted overnight. NIH 0. Given labetolol once for high blood pressure. MRI/MRA completed. Echo today.    Coping/Psychosocial   Patient Agreement with Plan of Care agrees       Problem: Stroke (Ischemic) (Adult)  Goal: Signs and Symptoms of Listed Potential Problems Will be Absent, Minimized or Managed (Stroke)  Outcome: Ongoing (interventions implemented as appropriate)

## 2019-05-13 NOTE — SIGNIFICANT NOTE
05/13/19 1046   Rehab Time/Intention   Evaluation Not Performed other (see comments)  (Pt  and RN Jasmyne state no PT needs at this time as pt is able to ambulate independently without difficulty. PT will sign off at this time.)   Rehab Treatment   Discipline physical therapist

## 2019-05-13 NOTE — PROGRESS NOTES
"DOS: 2019  NAME: Tiff Del Rio   : 1940  PCP: Christian Dorsey MD    Chief Complaint   Patient presents with   • Speech Problem     Pt states that at 0945 she started having difficulty carrying on a conversation. States that the words would not come out correctly.         Stroke    Subjective: Pt seen in follow up, however the problem is new to me. Denies any new weakness, numbness, speech or visual disturbances, or headaches. Back to her baseline. Ready to go home. No family at bedside.    Objective:  Vital signs:      Vitals:    19 2320 19 0011 19 0636 19 0757   BP: (!) 210/92 180/92  180/92   BP Location:  Left arm     Patient Position:  Lying     Pulse: 72  68 68   Resp:   18    Temp:   97.4 °F (36.3 °C)    TempSrc:   Oral    SpO2:   93%    Weight:    80.7 kg (178 lb)   Height:    160 cm (63\")       Current Facility-Administered Medications:   •  acetaminophen (TYLENOL) tablet 650 mg, 650 mg, Oral, Q4H PRN, Radha Torres APRN  •  aspirin tablet 325 mg, 325 mg, Oral, Daily, 325 mg at 19 **OR** aspirin suppository 300 mg, 300 mg, Rectal, Daily, Radha Torres APRTOBY  •  atorvastatin (LIPITOR) tablet 80 mg, 80 mg, Oral, Nightly, Radha Torres APRN, 80 mg at 19  •  bisacodyl (DULCOLAX) suppository 10 mg, 10 mg, Rectal, Daily PRN, Radha Torres APRTOBY  •  cyanocobalamin injection 1,000 mcg, 1,000 mcg, Intramuscular, Daily, Tara Mahmood MD  •  dextrose (D50W) 25 g/ 50mL Intravenous Solution 25 g, 25 g, Intravenous, Q15 Min PRN, Radha Torres APRTOBY  •  dextrose (GLUTOSE) oral gel 15 g, 15 g, Oral, Q15 Min PRN, Radha Torres APRTOBY  •  exemestane (AROMASIN) chemo tablet 25 mg, 25 mg, Oral, Daily, Radha Torres, [place]  •  glucagon (human recombinant) (GLUCAGEN DIAGNOSTIC) injection 1 mg, 1 mg, Subcutaneous, PRN, Radha Torres, [place]  •  insulin lispro (humaLOG) injection 0-7 Units, 0-7 Units, Subcutaneous, 4x " Daily With Meals & Nightly, Radha Torres APRN  •  labetalol (NORMODYNE,TRANDATE) injection 20 mg, 20 mg, Intravenous, Q4H PRN, Jean Santos MD, 20 mg at 05/12/19 2320  •  losartan (COZAAR) 50 mg, hydrochlorothiazide (MICROZIDE) 12.5 mg for HYZAAR 50-12.5, , Oral, Daily, Tara Mahmood MD  •  metoprolol succinate XL (TOPROL-XL) 24 hr tablet 200 mg, 200 mg, Oral, Daily, Radha Torres, APRN, 200 mg at 05/12/19 1759  •  nitroglycerin (NITROSTAT) SL tablet 0.4 mg, 0.4 mg, Sublingual, Q5 Min PRN, Radha Torres, APRN  •  ondansetron (ZOFRAN) injection 4 mg, 4 mg, Intravenous, Q6H PRN, Radha Torres APRN  •  ondansetron (ZOFRAN) tablet 4 mg, 4 mg, Oral, Q6H PRN **OR** ondansetron (ZOFRAN) injection 4 mg, 4 mg, Intravenous, Q6H PRN, Radha Torres, APRN  •  pantoprazole (PROTONIX) EC tablet 40 mg, 40 mg, Oral, QAM, Radha Torres, APRN, 40 mg at 05/13/19 0652  •  [COMPLETED] Insert peripheral IV, , , Once **AND** sodium chloride 0.9 % flush 10 mL, 10 mL, Intravenous, PRN, Christian Laureano II, MD  •  sodium chloride 0.9 % flush 3 mL, 3 mL, Intravenous, Q12H, Radha Torres, APRN, 3 mL at 05/12/19 2103  •  sodium chloride 0.9 % flush 3-10 mL, 3-10 mL, Intravenous, PRN, Daisy Torresine ANDREW, APRN  •  tobramycin 0.3 % ophthalmic solution 2 drop, 2 drop, Right Eye, Q8H, Radha Torres, APRN, 2 drop at 05/13/19 0653    PRN meds  •  acetaminophen  •  bisacodyl  •  dextrose  •  dextrose  •  glucagon (human recombinant)  •  labetalol  •  nitroglycerin  •  ondansetron  •  ondansetron **OR** ondansetron  •  [COMPLETED] Insert peripheral IV **AND** sodium chloride  •  sodium chloride    No current facility-administered medications on file prior to encounter.      Current Outpatient Medications on File Prior to Encounter   Medication Sig   • exemestane (AROMASIN) 25 MG chemo tablet Take 1 tablet by mouth Daily.   • furosemide (LASIX) 20 MG tablet Take 1 tablet by mouth Daily.    • levothyroxine (SYNTHROID, LEVOTHROID) 112 MCG tablet TAKE ONE TABLET BY MOUTH DAILY   • metFORMIN (GLUCOPHAGE) 500 MG tablet Take 1 tablet by mouth Daily With Breakfast.   • metoprolol succinate XL (TOPROL-XL) 200 MG 24 hr tablet Take 1 tablet by mouth Daily.   • olmesartan-hydrochlorothiazide (BENICAR HCT) 40-12.5 MG per tablet Take 1 tablet by mouth Daily.   • omeprazole (priLOSEC) 40 MG capsule    • tobramycin (TOBREX) 0.3 % solution ophthalmic solution Administer 2 drops to the right eye Every 8 (Eight) Hours.       General appearance: NAD, alert and cooperative, well groomed  HEENT: Normocephalic, atraumatic, PERRL, no masses or tenderness  COR: RRR  Resp: Even and unlabored  Extremities: Equal pulses  Skin: warm, dry    Neurological:   MS: oriented x3, recent/remote memory intact, normal attention/concentration, language intact, no neglect, normal fund of knowledge  CN: visual acuity grossly normal, visual fields full, PERRL, EOMI, facial sensation equal, no facial droop, hearing symmetric, palate elevates symmetrically, shoulder shrug equal, tongue midline  Motor: 5/5 in all 4 ext., normal tone  Reflexes: 2+ in all 4 ext.  Sensory: light touch sensation intact in all 4 ext.  Coordination: Normal finger to nose test, normal heel to shin test    Laboratory results:  Lab Results   Component Value Date    TSH 0.042 (L) 05/13/2019     Lab Results   Component Value Date    HGBA1C 5.36 05/13/2019     Lab Results   Component Value Date    MIJSEYIL48 172 (L) 05/13/2019     Lab Results   Component Value Date    CHOL 141 05/13/2019     Lab Results   Component Value Date    TRIG 168 (H) 05/13/2019     Lab Results   Component Value Date    HDL 31 (L) 05/13/2019     Lab Results   Component Value Date    LDL 76 05/13/2019     Lab Results   Component Value Date    WBC 7.88 05/13/2019    HGB 11.7 (L) 05/13/2019    HCT 36.7 05/13/2019    MCV 88.6 05/13/2019     05/13/2019     Lab Results   Component Value Date     GLUCOSE 91 05/13/2019    BUN 10 05/13/2019    CREATININE 0.70 05/13/2019    EGFRIFNONA 81 05/13/2019    EGFRIFAFRI 69 01/16/2019    BCR 14.3 05/13/2019    K 3.5 05/13/2019    CO2 24.2 05/13/2019    CALCIUM 8.4 (L) 05/13/2019    PROTENTOTREF 7.4 01/16/2019    ALBUMIN 3.50 05/13/2019    LABIL2 1.4 01/16/2019    AST 14 05/13/2019    ALT 10 05/13/2019     No results found for: PTT  No results found for: INR, PROTIME  Brief Urine Lab Results     None        Free T4 1.68    Review and interpretation of imaging:  Xr Chest 2 View    Result Date: 5/12/2019  No evidence for acute pulmonary process. Tortuous aorta. Follow-up as clinically indicated.  This report was finalized on 5/12/2019 12:19 PM by Dr. Mac Cruz M.D.      Ct Head Without Contrast    Result Date: 5/12/2019   No acute intracranial hemorrhage or hydrocephalus. If there is further clinical concern, MRI could be considered for further evaluation.  This report was finalized on 5/12/2019 12:18 PM by Dr. Mac Cruz M.D.      Mri Angiogram Head Without Contrast    Result Date: 5/12/2019  1. There is some mild focal narrowing of the distal right P1 segment, otherwise unremarkable exam.         Mri Angiogram Neck With & Without Contrast    Result Date: 5/12/2019  1.  No significant plaque or stenosis identified.       Mri Brain With & Without Contrast    Result Date: 5/12/2019  1. A 9 mm enhancing right frontal region extra-axial mass most consistent with small meningioma. 2. Other chronic-appearing parenchymal abnormalities as discussed, no acute intracranial finding or abnormal enhancement. 3. Chronic appearing paranasal sinus disease.            Impression/Assessment:  This is a 79 yo female with PMH of DM, HTN, breast cancer, thyroid cancer, ICH in the setting of HTN 12 years ago, who presented to the hospiotal on 56/12 with c/o difficulty getting words out with questionalble associated right eye droop and a mild headache.  Headache resolved and  lasted around 30 minutes. She did return back to baseline upon arrival to the ER. CT head obtained and was negative for any acute intracranial findings. MRI/MRA with no acute or chronic infarcts noted. She was given IV labetolol and started on  mg. EKG with NSR. BP on arrival 213/100.    1. TIA, transient expressive aphasia, suspect threatened LMCA branch thrombosis  2. Hypertension    2D echo pending. Brain and vessel imaging unremarkable. B12 172, continue replacement per primary. Continue ASA, decrease to 81mg. Continue statin, recommend repeat lipid panel in 6 months with PCP. Goal BP <130/80, normalize slowly. Her TSH and Free T4 were also abnormal, treatment per primary. She can follow up with me in our clinic as needed. Recommend 3 week holter monitor at discharge to monitor for cardiac arrhthymias; specifically afib. Therapies as written. CCP for discharge planning. Call RRT for any acute neurological changes. We will sign off, will see again per request.    Plan:  ASA 81mg  Lipitor 80mg,   Zio patch at discharge  Neurochecks per stroke protocol  Normalize BP slowly  Stroke Education  MAIA/SCDs  PT/OT/ST  Follow up with me in our neurology clinic as needed.  We will sign off, will see again per request.   Ideal targets for stroke prevention would be :  Blood pressure < 130/80; B12 > 500 TSH in normal range and LDL < 70; HbA1c < 6.5 and smoking cessation if applicable.    Case discussed with patient, RN, and Dr. Mar, and he agrees with plan above.   [place] Salcido    **Meghan Disclaimer:**  Much of this encounter note is an electronic transcription/translation of spoken language to printed text. The electronic translation of spoken language may permit erroneous, or at times, nonsensical words or phrases to be inadvertently transcribed. Although I have reviewed the note for such errors, some may still exist.

## 2019-05-13 NOTE — PROGRESS NOTES
Case Management Discharge Note    Final Note: Discharged home. Family transported.              Final Discharge Disposition Code: 01 - home or self-care

## 2019-05-13 NOTE — DISCHARGE SUMMARY
Date of Admission: 5/12/2019  Date of Discharge:  5/13/2019  Primary Care Physician: Christian Dorsey MD     Discharge Diagnosis:  Active Hospital Problems    Diagnosis  POA   • **TIA (transient ischemic attack) [G45.9]  Yes   • Hyperthyroidism- on suppressive therapy [E05.90]  Unknown   • B12 deficiency anemia [D51.9]  Unknown   • Type 2 diabetes mellitus with neurologic complication (CMS/HCC) [E11.49]  Unknown   • Essential hypertension [I10]  Yes      Resolved Hospital Problems    Diagnosis Date Resolved POA   • Hypothyroidism (acquired) [E03.9] 05/13/2019 Yes       Presenting Problem/History of Present Illness:  TIA (transient ischemic attack) [G45.9]  Primary hypertension [I10]  Meningioma (CMS/HCC) [D32.9]     Hospital Course:  The patient is a 78 y.o. woman admitted after an episode of expressive aphasia.  Her episode resolved quickly.  She had stroke work-up and neurology evaluation.  She was started on aspirin and a statin.  TSH is very low although T4 is normal.  She is on suppressive dose of thyroxine because of history of thyroid tumors.  I decreased her dosage slightly because of the risk of atrial fibrillation.  ZIO Patch will be placed.  Restart her Benicar HCT and Lasix.  Outpatient follow-up for blood pressure.  Recheck thyroid function tests in 6 weeks.  Follow-up on lipids in 3 months.    She was also found to be B12 deficient.  She received a dose of IM B12 today.  She can have weekly doses for 3 more weeks and then monthly doses.  I discussed this with her.      Stable condition; good prognosis    Exam Today: Feels fine.  Anxious for discharge.  No dizziness or lightheadedness.  No palpitations.  Vital signs noted.  No distress.  Heart is regular without murmur.  Lungs are clear.  Abdomen soft and nontender.  Extremities no edema    Procedures Performed:  Xr Chest 2 View    Result Date: 5/12/2019  No evidence for acute pulmonary process. Tortuous aorta. Follow-up as clinically indicated.  This  report was finalized on 5/12/2019 12:19 PM by Dr. Mac Cruz M.D.      Ct Head Without Contrast    Result Date: 5/12/2019   No acute intracranial hemorrhage or hydrocephalus. If there is further clinical concern, MRI could be considered for further evaluation.  This report was finalized on 5/12/2019 12:18 PM by Dr. Mac Cruz M.D.      Mri Angiogram Head Without Contrast    Result Date: 5/12/2019  1. There is some mild focal narrowing of the distal right P1 segment, otherwise unremarkable exam.         Mri Angiogram Neck With & Without Contrast    Result Date: 5/12/2019  1.  No significant plaque or stenosis identified.       Mri Brain With & Without Contrast    Result Date: 5/12/2019  1. A 9 mm enhancing right frontal region extra-axial mass most consistent with small meningioma. 2. Other chronic-appearing parenchymal abnormalities as discussed, no acute intracranial finding or abnormal enhancement. 3. Chronic appearing paranasal sinus disease.       Echocardiogram 5/13/2019 which showed ejection fraction 68%.  Grade 1 diastolic dysfunction.  Saline test negative.  Mild mitral valve regurg with multiple jets noted.  Calculated RV systolic pressure 27       Labs:  Lab Results   Component Value Date    WBC 7.88 05/13/2019    HGB 11.7 (L) 05/13/2019    HCT 36.7 05/13/2019     05/13/2019     Lab Results   Component Value Date     05/13/2019    K 3.5 05/13/2019     05/13/2019    CO2 24.2 05/13/2019    BUN 10 05/13/2019    CREATININE 0.70 05/13/2019    GLUCOSE 91 05/13/2019     TSH low at 0.04 with normal free T 1.4  B12 low at 172  Triglycerides 168 HDL 31 LDL 76  A1c 5.4        Consults:   Dr. Bustos    Discharge Disposition:  Home or Self Care    Discharge Medications:     Discharge Medications      New Medications      Instructions Start Date   aspirin 81 MG chewable tablet   81 mg, Oral, Daily   Start Date:  5/14/2019     atorvastatin 80 MG tablet  Commonly known as:  LIPITOR   80  mg, Oral, Nightly      cyanocobalamin 1000 MCG/ML injection   1,000 mcg, Intramuscular, Weekly, Get thru MD office         Changes to Medications      Instructions Start Date   levothyroxine 88 MCG tablet  Commonly known as:  SYNTHROID  What changed:    · medication strength  · how much to take   88 mcg, Oral, Daily         Continue These Medications      Instructions Start Date   exemestane 25 MG chemo tablet  Commonly known as:  AROMASIN   25 mg, Oral, Daily      furosemide 20 MG tablet  Commonly known as:  LASIX   20 mg, Oral, Daily      metFORMIN 500 MG tablet  Commonly known as:  GLUCOPHAGE   500 mg, Oral, Daily With Breakfast      metoprolol succinate  MG 24 hr tablet  Commonly known as:  TOPROL-XL   200 mg, Oral, Daily      olmesartan-hydrochlorothiazide 40-12.5 MG per tablet  Commonly known as:  BENICAR HCT   1 tablet, Oral, Daily      omeprazole 40 MG capsule  Commonly known as:  priLOSEC   No dose, route, or frequency recorded.      tobramycin 0.3 % solution ophthalmic solution  Commonly known as:  TOBREX   2 drops, Right Eye, Every 8 Hours Scheduled             Discharge Diet:   Diet Instructions     Diet: Regular      Discharge Diet:  Regular          Activity at Discharge:   Activity Instructions     Activity as Tolerated            Follow-up Appointments:  Future Appointments   Date Time Provider Department Center   5/14/2019 10:30 AM Betsey Harley APRN [facility] None     Follow-up Information     Christian Dorsey MD Follow up in 2 week(s).    Specialty:  Family Medicine  Why:  TIA, hypertension  Contact information:  [address]  [address]  [facility] [id number] [phone]             Paddy Hameed MD Follow up in 6 week(s).    Specialty:  Otolaryngology  Why:  Recheck thyroid function test  Contact information:  [address] [facility] [address]  [facility] [id number] [phone]             Radha Madsen APRN Follow up in 2 month(s).    Specialty:  Neurology  Why:   TIA  Contact information:  [address] [place] [address] [phone]                     Test Results Pending at Discharge: None       Tara Mahmood MD  05/13/19  11:01 AM    Time Spent on Discharge Activities: 35 minutes.  Discussed with Dr. Mar.  Discussed with nurse

## 2019-05-13 NOTE — SIGNIFICANT NOTE
05/13/19 1152   Rehab Time/Intention   Evaluation Not Performed other (see comments)  (Pt. tolerating regular diet, swallow screen passed; admitted with TIA, symptoms resolved at this time. Discussed with pt. who states she is not having any difficulty with swallowing. Plan to d/c home this date. Eval. not warratned; ST to s/o.)   Rehab Treatment   Discipline speech language pathologist

## 2019-05-14 ENCOUNTER — READMISSION MANAGEMENT (OUTPATIENT)
Dept: CALL CENTER | Facility: HOSPITAL | Age: 79
End: 2019-05-14

## 2019-05-14 ENCOUNTER — OFFICE VISIT (OUTPATIENT)
Dept: FAMILY MEDICINE CLINIC | Facility: CLINIC | Age: 79
End: 2019-05-14

## 2019-05-14 VITALS
BODY MASS INDEX: 31.23 KG/M2 | HEART RATE: 65 BPM | SYSTOLIC BLOOD PRESSURE: 190 MMHG | TEMPERATURE: 98 F | DIASTOLIC BLOOD PRESSURE: 100 MMHG | OXYGEN SATURATION: 98 % | WEIGHT: 176.3 LBS

## 2019-05-14 DIAGNOSIS — E11.40 TYPE 2 DIABETES MELLITUS WITH DIABETIC NEUROPATHY, WITHOUT LONG-TERM CURRENT USE OF INSULIN (HCC): ICD-10-CM

## 2019-05-14 DIAGNOSIS — I10 ESSENTIAL HYPERTENSION: ICD-10-CM

## 2019-05-14 DIAGNOSIS — E05.90 HYPERTHYROIDISM: ICD-10-CM

## 2019-05-14 DIAGNOSIS — G45.9 TIA (TRANSIENT ISCHEMIC ATTACK): Primary | ICD-10-CM

## 2019-05-14 PROCEDURE — 99214 OFFICE O/P EST MOD 30 MIN: CPT | Performed by: NURSE PRACTITIONER

## 2019-05-14 RX ORDER — AMLODIPINE BESYLATE 5 MG/1
5 TABLET ORAL DAILY
Qty: 30 TABLET | Refills: 1 | Status: SHIPPED | OUTPATIENT
Start: 2019-05-14

## 2019-05-14 NOTE — OUTREACH NOTE
Prep Survey      Responses   Facility patient discharged from?  [facility]   Is patient eligible?  Yes   Discharge diagnosis  TIA, B-12 deficiency,  Home with ZIO Patch   Does the patient have one of the following disease processes/diagnoses(primary or secondary)?  Stroke (TIA)   Does the patient have Home health ordered?  No   Is there a DME ordered?  No   Prep survey completed?  Yes          Laura Noble RN

## 2019-05-14 NOTE — PROGRESS NOTES
Subjective   Tiff Del Rio is a 78 y.o. female presents to [facility] care.   Recently discharged from hospital yesterday with stroke work up. She was diagnosed with TIA, previous hemorrhagic stroke 2004. She was started on low dose ASA, atorvastatin 80 mg. Her b12 was noted to be decreased. She was given one injection and recommended to repeat x 3 more weekly.   All other medications were continued at current dose. Of note, her BP today is elevated at 190/100, repeat 182/92. She has taken her medication today. Her BP at ED was 209/103. She denies headaches, chest pain or lower extremity edema. States she very seldom gets a headache.   Taking levothyroxine, managed by Dr. Hameed, thyroid level overactive, dose reduced to 88 mcg at discharge, Dr. Hameed recommends staying hyperactive    She does report cough, frequent, dry in the winter, but more productive in the spring. Attributes this to her allergies. Chest xray was completed in ED and was negative for acute process.     Hypertension   This is a recurrent problem. The current episode started more than 1 year ago. The problem has been waxing and waning since onset. Associated symptoms include anxiety. Pertinent negatives include no blurred vision, chest pain, headaches, malaise/fatigue, neck pain, orthopnea, palpitations, peripheral edema, PND, shortness of breath or sweats. Agents associated with hypertension include thyroid hormones. Risk factors for coronary artery disease include diabetes mellitus, dyslipidemia, obesity and stress. Past treatments include beta blockers, diuretics and angiotensin blockers. Current antihypertension treatment includes angiotensin blockers, diuretics and beta blockers. The current treatment provides mild improvement. There are no compliance problems.  Hypertensive end-organ damage includes CVA.   Cerebrovascular Accident   This is a new problem. The current episode started in the past 7 days. The problem occurs constantly. The  problem has been resolved. Associated symptoms include weakness (right sided, from previous stroke). Pertinent negatives include no abdominal pain, anorexia, arthralgias, change in bowel habit, chest pain, chills, congestion, coughing, diaphoresis, fatigue, fever, headaches, joint swelling, myalgias, nausea, neck pain, numbness, rash, sore throat, swollen glands, urinary symptoms, vertigo, visual change or vomiting. Nothing aggravates the symptoms. Treatments tried: ER work up was negative for acute process.        The following portions of the patient's history were reviewed and updated as appropriate: allergies, current medications, past family history, past medical history, past social history, past surgical history and problem list.    Review of Systems   Constitutional: Negative for chills, diaphoresis, fatigue, fever and malaise/fatigue.   HENT: Negative.  Negative for congestion and sore throat.    Eyes: Negative.  Negative for blurred vision.   Respiratory: Negative.  Negative for cough and shortness of breath.    Cardiovascular: Negative.  Negative for chest pain, palpitations, orthopnea and PND.   Gastrointestinal: Negative.  Negative for abdominal pain, anorexia, change in bowel habit, nausea and vomiting.   Endocrine: Negative.    Genitourinary: Negative.    Musculoskeletal: Negative.  Negative for arthralgias, joint swelling, myalgias and neck pain.   Skin: Negative.  Negative for rash.   Allergic/Immunologic: Negative.    Neurological: Positive for speech difficulty (at onset of symptoms, now resolved) and weakness (right sided, from previous stroke). Negative for dizziness, vertigo, tremors, seizures, syncope, facial asymmetry, light-headedness, numbness and headaches.   Hematological: Negative.    Psychiatric/Behavioral: Negative.        Objective   Physical Exam   Constitutional: She is oriented to person, place, and time. She appears well-developed and well-nourished. No distress.   HENT:   Head:  Normocephalic and atraumatic.   Right Ear: Tympanic membrane, external ear and ear canal normal.   Left Ear: Tympanic membrane, external ear and ear canal normal.   Nose: Nose normal.   Mouth/Throat: Uvula is midline, oropharynx is clear and moist and mucous membranes are normal. No oropharyngeal exudate.   Neck: Neck supple.   Cardiovascular: Normal rate, regular rhythm and normal heart sounds. Exam reveals no gallop and no friction rub.   No murmur heard.  Pulmonary/Chest: Effort normal and breath sounds normal. No respiratory distress. She has no wheezes. She has no rales.   Abdominal: Soft. Bowel sounds are normal. She exhibits no distension. There is no tenderness.   Neurological: She is alert and oriented to person, place, and time.   Skin: Skin is warm and dry. She is not diaphoretic.   Psychiatric: She has a normal mood and affect.   Vitals reviewed.      Assessment/Plan   Tiff was seen today for cerebrovascular accident.    Diagnoses and all orders for this visit:    TIA (transient ischemic attack)  Comments:  follow up with neurology, information given to patient to call and schedule appt  any change in symptoms, return to ED, continue ASA and statin    Hyperthyroidism- on suppressive therapy  Comments:  follow up with Dr. Hameed, medication change, may contribute to htn, repeat labs recommended six weeks, pt to call and schedule    Type 2 diabetes mellitus with diabetic neuropathy, without long-term current use of insulin (CMS/[facility])  Comments:  continue metformin, A1C is well controlled    Essential hypertension  Comments:  uncontrolled, add amlodipine, continue current treatment, recheck in one week nurse visit    Other orders  -     amLODIPine (NORVASC) 5 MG tablet; Take 1 tablet by mouth Daily.        Pt to return for B12 injection next 3 mondays, starting amlodipine, will have nurse check BP at that time  Consider increase to 10 mg if not controlled  Thyroid was hyperactive, medication was adjusted,  could contribute to HTN  Follow up with Dr. Hameed, pt instructed to call and schedule six week follow up  Info given for neurologist according to d/c summary, no appointment has been made. Patient will call today  Will repeat labs in 3 months to monitor vitamin B, thyroid, liver fxn with high dose statin  For acute symptoms, recommend return to ED

## 2019-05-15 ENCOUNTER — READMISSION MANAGEMENT (OUTPATIENT)
Dept: CALL CENTER | Facility: HOSPITAL | Age: 79
End: 2019-05-15

## 2019-05-15 NOTE — OUTREACH NOTE
Stroke Week 1 Survey      Responses   Facility patient discharged from?  [facility]   Does the patient have one of the following disease processes/diagnoses(primary or secondary)?  Stroke (TIA)   Is there a successful TCM telephone encounter documented?  No   Week 1 attempt successful?  Yes   Call start time  1535   Call end time  1549   Discharge diagnosis  TIA, B-12 deficiency,  Home with ZIO Patch   Meds reviewed with patient/caregiver?  Yes   Is the patient having any side effects they believe may be caused by any medication additions or changes?  No   Does the patient have all medications ordered at discharge?  Yes   Is the patient taking all medications as directed (includes completed medication regime)?  Yes   Does the patient have a primary care provider?   Yes   Does the patient have an appointment with their PCP within 7 days of discharge?  Yes   Has the patient kept scheduled appointments due by today?  Yes   Has home health visited the patient within 72 hours of discharge?  N/A   Psychosocial issues?  No   Does the patient require any assistance with activities of daily living such as eating, bathing, dressing, walking, etc.?  No   Does the patient have any residual symptoms from stroke/TIA?  No   Does the patient understand the diet ordered at discharge?  Yes   Did the patient receive a copy of their discharge instructions?  Yes   Nursing interventions  Reviewed instructions with patient   What is the patient's perception of their health status since discharge?  Improving   Nursing interventions  Nurse provided patient education   Is the patient able to teach back FAST for Stroke?  Yes   Is the patient/caregiver able to teach back the risk factors for a stroke?  High Cholesterol, High blood pressure-goal below 120/80   Is the patient/caregiver able to teach back signs and symptoms related to disease process for when to call PCP?  Yes   Is the patient/caregiver able to teach back signs and symptoms  related to disease process for when to call 911?  Yes   Is the patient/caregiver able to teach back the hierarchy of who to call/visit for symptoms/problems? PCP, Specialist, Home health nurse, Urgent Care, ED, 911  Yes   Week 1 call completed?  Yes          Rodri Mims RN

## 2019-05-20 ENCOUNTER — CLINICAL SUPPORT (OUTPATIENT)
Dept: FAMILY MEDICINE CLINIC | Facility: CLINIC | Age: 79
End: 2019-05-20

## 2019-05-20 DIAGNOSIS — E53.8 VITAMIN B 12 DEFICIENCY: Primary | ICD-10-CM

## 2019-05-20 PROCEDURE — 96372 THER/PROPH/DIAG INJ SC/IM: CPT | Performed by: NURSE PRACTITIONER

## 2019-05-20 RX ORDER — CYANOCOBALAMIN 1000 UG/ML
1000 INJECTION, SOLUTION INTRAMUSCULAR; SUBCUTANEOUS
Status: DISCONTINUED | OUTPATIENT
Start: 2019-05-20 | End: 2019-05-28

## 2019-05-20 RX ADMIN — CYANOCOBALAMIN 1000 MCG: 1000 INJECTION, SOLUTION INTRAMUSCULAR; SUBCUTANEOUS at 13:49

## 2019-05-23 ENCOUNTER — READMISSION MANAGEMENT (OUTPATIENT)
Dept: CALL CENTER | Facility: HOSPITAL | Age: 79
End: 2019-05-23

## 2019-05-23 NOTE — OUTREACH NOTE
Stroke Week 2 Survey      Responses   Facility patient discharged from?  [facility]   Does the patient have one of the following disease processes/diagnoses(primary or secondary)?  Stroke (TIA)   Week 2 attempt successful?  No   Unsuccessful attempts  Attempt 1          Priya Fischer RN

## 2019-05-28 ENCOUNTER — READMISSION MANAGEMENT (OUTPATIENT)
Dept: CALL CENTER | Facility: HOSPITAL | Age: 79
End: 2019-05-28

## 2019-05-28 ENCOUNTER — CLINICAL SUPPORT (OUTPATIENT)
Dept: FAMILY MEDICINE CLINIC | Facility: CLINIC | Age: 79
End: 2019-05-28

## 2019-05-28 DIAGNOSIS — E53.8 VITAMIN B 12 DEFICIENCY: Primary | ICD-10-CM

## 2019-05-28 PROCEDURE — 96372 THER/PROPH/DIAG INJ SC/IM: CPT | Performed by: NURSE PRACTITIONER

## 2019-05-28 RX ORDER — CYANOCOBALAMIN 1000 UG/ML
1000 INJECTION, SOLUTION INTRAMUSCULAR; SUBCUTANEOUS
Status: SHIPPED | OUTPATIENT
Start: 2019-05-28

## 2019-05-28 RX ADMIN — CYANOCOBALAMIN 1000 MCG: 1000 INJECTION, SOLUTION INTRAMUSCULAR; SUBCUTANEOUS at 16:09

## 2019-05-28 NOTE — OUTREACH NOTE
Stroke Week 2 Survey      Responses   Facility patient discharged from?  [facility]   Does the patient have one of the following disease processes/diagnoses(primary or secondary)?  Stroke (TIA)   Week 2 attempt successful?  No   Unsuccessful attempts  Attempt 2          Pedro Grande RN

## 2019-05-29 ENCOUNTER — OFFICE VISIT (OUTPATIENT)
Dept: FAMILY MEDICINE CLINIC | Facility: CLINIC | Age: 79
End: 2019-05-29

## 2019-05-29 VITALS
OXYGEN SATURATION: 97 % | DIASTOLIC BLOOD PRESSURE: 94 MMHG | TEMPERATURE: 98.2 F | BODY MASS INDEX: 31.35 KG/M2 | HEART RATE: 88 BPM | WEIGHT: 177 LBS | SYSTOLIC BLOOD PRESSURE: 160 MMHG

## 2019-05-29 DIAGNOSIS — L98.9 SUPERFICIAL SKIN LESION: Primary | ICD-10-CM

## 2019-05-29 PROCEDURE — 99213 OFFICE O/P EST LOW 20 MIN: CPT | Performed by: NURSE PRACTITIONER

## 2019-05-29 NOTE — PATIENT INSTRUCTIONS
Skin Abscess  A skin abscess is an infected area on or under your skin that contains a collection of pus and other material. An abscess may also be called a furuncle, carbuncle, or boil. An abscess can occur in or on almost any part of your body.  Some abscesses break open (rupture) on their own. Most continue to get worse unless they are treated. The infection can spread deeper into the body and eventually into your blood, which can make you feel ill. Treatment usually involves draining the abscess.  What are the causes?  An abscess occurs when germs, often bacteria, pass through your skin and cause an infection. This may be caused by:  · A scrape or cut on your skin.  · A puncture wound through your skin, including a needle injection.  · Blocked oil or sweat glands.  · Blocked and infected hair follicles.  · A cyst that forms beneath your skin (sebaceous cyst) and becomes infected.    What increases the risk?  This condition is more likely to develop in people who:  · Have a weak body defense system (immune system).  · Have diabetes.  · Have dry and irritated skin.  · Get frequent injections or use illegal IV drugs.  · Have a foreign body in a wound, such as a splinter.  · Have problems with their lymph system or veins.    What are the signs or symptoms?  An abscess may start as a painful, firm bump under the skin. Over time, the abscess may get larger or become softer. Pus may appear at the top of the abscess, causing pressure and pain. It may eventually break through the skin and drain. Other symptoms include:  · Redness.  · Warmth.  · Swelling.  · Tenderness.  · A sore on the skin.    How is this diagnosed?  This condition is diagnosed based on your medical history and a physical exam. A sample of pus may be taken from the abscess to find out what is causing the infection and what antibiotics can be used to treat it. You also may have:  · Blood tests to look for signs of infection or spread of an infection to  your blood.  · Imaging studies such as ultrasound, CT scan, or MRI if the abscess is deep.    How is this treated?  Small abscesses that drain on their own may not need treatment. Treatment for an abscess that does not rupture on its own may include:  · Warm compresses applied to the area several times per day.  · Incision and drainage. Your health care provider will make an incision to open the abscess and will remove pus and any foreign body or dead tissue. The incision area may be packed with gauze to keep it open for a few days while it heals.  · Antibiotic medicines to treat infection. For a severe abscess, you may first get antibiotics through an IV and then change to oral antibiotics.    Follow these instructions at home:  Abscess Care  · If you have an abscess that has not drained, place a warm, clean, wet washcloth over the abscess several times a day. Do this as told by your health care provider.  · Follow instructions from your health care provider about how to take care of your abscess. Make sure you:  ? Cover the abscess with a bandage (dressing).  ? Change your dressing or gauze as told by your health care provider.  ? Wash your hands with soap and water before you change the dressing or gauze. If soap and water are not available, use hand .  · Check your abscess every day for signs of a worsening infection. Check for:  ? More redness, swelling, or pain.  ? More fluid or blood.  ? Warmth.  ? More pus or a bad smell.  Medicines  · Take over-the-counter and prescription medicines only as told by your health care provider.  · If you were prescribed an antibiotic medicine, take it as told by your health care provider. Do not stop taking the antibiotic even if you start to feel better.  General instructions  · To avoid spreading the infection:  ? Do not share personal care items, towels, or hot tubs with others.  ? Avoid making skin contact with other people.  · Keep all follow-up visits as told by  your health care provider. This is important.  Contact a health care provider if:  · You have more redness, swelling, or pain around your abscess.  · You have more fluid or blood coming from your abscess.  · Your abscess feels warm to the touch.  · You have more pus or a bad smell coming from your abscess.  · You have a fever.  · You have muscle aches.  · You have chills or a general ill feeling.  Get help right away if:  · You have severe pain.  · You see red streaks on your skin spreading away from the abscess.  This information is not intended to replace advice given to you by your health care provider. Make sure you discuss any questions you have with your health care provider.  Document Released: 09/27/2006 Document Revised: 08/13/2017 Document Reviewed: 10/26/2016  [employer] Interactive Patient Education © 2019 [employer]

## 2019-05-29 NOTE — PROGRESS NOTES
Subjective   Tiff Del Rio is a 78 y.o. female.     Chief Complaint   Patient presents with   • Insect Bite     she thinks maybe insect bite. looked like a blackhead on left hand 1st digit. she has squeezed and nothing has come out. yesterday came to head and pushed on it and got a hard thing out of it bp elevated today because she hasnt taken meds today      HPI patient is new to me.  She is here for recent bite of some sort on her left first finger dorsal aspect.  It is not itchy or painful and she did not see a bug however over this past weekend she noticed a red spot that appeared to be an insect bite with a black center.  After 2 days she was able to express something from the center which appeared to be a larva which she has brought to the office.  She was able to express this the day after she expressed what she thought was a piece of the bug as it appeared to be black.  She has never had anything like this in the past.  She has a friend who lost their finger due to an insect bite, she is not sure what kind of insect, and she is concerned this may happen to her.  She has not applied any medications to her finger.    She is outside frequently working in her garden but  she always use his gloves when she is out there working in dirt.     She reports well controlled T2DM.  Social History     Tobacco Use   • Smoking status: Never Smoker   • Smokeless tobacco: Never Used   Substance Use Topics   • Alcohol use: Yes     Types: 1 Glasses of wine per week     Comment: Occasional   • Drug use: No       The following portions of the patient's history were reviewed and updated as appropriate: allergies, current medications, past family history, past medical history, past social history, past surgical history and problem list.    Review of Systems   Constitutional: Negative for chills and fever.   Respiratory: Negative for cough and shortness of breath.    Cardiovascular: Negative for chest pain and palpitations.    Gastrointestinal: Negative for abdominal pain, diarrhea, nausea and vomiting.   Musculoskeletal: Negative for arthralgias, joint swelling and myalgias.        Finger is not painful, stiff, has full range of motion, had initially mild proximal swelling which is resolved.  She was going to take a needle and open up the spot on her finger however she elected to come to the office today instead.   Skin: Negative for rash.   Neurological: Negative for dizziness and headaches.       Objective   Blood pressure 160/94, pulse 88, temperature 98.2 °F (36.8 °C), weight 80.3 kg (177 lb), SpO2 97 %, not currently breastfeeding.    Physical Exam   Constitutional: She is oriented to person, place, and time. She appears well-developed and well-nourished. No distress.   HENT:   Head: Normocephalic and atraumatic.   Mouth/Throat: Oropharynx is clear and moist.   Eyes: Conjunctivae are normal. Right eye exhibits no discharge. Left eye exhibits no discharge.   Cardiovascular: Normal rate, regular rhythm and normal heart sounds.   Pulmonary/Chest: Effort normal and breath sounds normal.   Abdominal: Soft. Bowel sounds are normal. There is no tenderness.   Musculoskeletal: She exhibits no deformity.   Gait smooth and steady   Neurological: She is alert and oriented to person, place, and time.   Skin: Skin is warm and dry. She is not diaphoretic.   Dorsum of left first finger with proximal mildly erythematous maculopapular lesion.  It is not indurated and is nontender to palpation.  Full range of motion and no proximal or distal joint swelling noted.    Patient did bring what she thinks might be a larva that came out of her finger here for me to assess.  It appears to be a small white strand of fibrin most likely which I discussed with patient   Psychiatric: She has a normal mood and affect.   Nursing note and vitals reviewed.      Assessment   Problem List Items Addressed This Visit     None      Visit Diagnoses     Superficial skin  lesion    -  Primary    Relevant Medications    mupirocin (BACTROBAN) 2 % ointment           Procedures           Impression and Plan: Most likely has small superficial skin infection to left proximal dorsum of first finger which appears to be resolving.  I will have patient do Epsom salt soaks TID and then dry well and apply topical mupirocin.  S/S worsening requiring emergent tx reviewed.  I have asked her to avoid continuing to try to squeeze out anything from the papule as she may cause a worsening skin infection.  I have also asked her to avoid applying a needle to try to drain it.  If this is needed she should return to the office.    Most recent A1C was 5.36.     Health Maintenance Due   Topic Date Due   • URINE MICROALBUMIN  1940   • ZOSTER VACCINE (1 of 2) 10/09/1988   • PNEUMOCOCCAL VACCINES (65+ LOW/MEDIUM RISK) (1 of 2 - PCV13) 10/09/2003   • MEDICARE ANNUAL WELLNESS  02/03/2016              EMR Dragon/Transcription disclaimer:   Much of this encounter note is an electronic transcription/translation of spoken language to printed text. The electronic translation of spoken language may permit erroneous, or at times, nonsensical words or phrases to be inadvertently transcribed; Although I have reviewed the note for such errors, some may still exist.

## 2019-05-30 ENCOUNTER — TELEPHONE (OUTPATIENT)
Dept: NEUROLOGY | Facility: CLINIC | Age: 79
End: 2019-05-30

## 2019-06-03 ENCOUNTER — CLINICAL SUPPORT (OUTPATIENT)
Dept: FAMILY MEDICINE CLINIC | Facility: CLINIC | Age: 79
End: 2019-06-03

## 2019-06-03 DIAGNOSIS — E53.8 B12 DEFICIENCY: Primary | ICD-10-CM

## 2019-06-03 PROCEDURE — 96372 THER/PROPH/DIAG INJ SC/IM: CPT | Performed by: NURSE PRACTITIONER

## 2019-06-03 RX ORDER — CYANOCOBALAMIN 1000 UG/ML
1000 INJECTION, SOLUTION INTRAMUSCULAR; SUBCUTANEOUS
Status: SHIPPED | OUTPATIENT
Start: 2019-06-03

## 2019-06-03 RX ADMIN — CYANOCOBALAMIN 1000 MCG: 1000 INJECTION, SOLUTION INTRAMUSCULAR; SUBCUTANEOUS at 14:03

## 2019-06-14 ENCOUNTER — TELEPHONE (OUTPATIENT)
Dept: FAMILY MEDICINE CLINIC | Facility: CLINIC | Age: 79
End: 2019-06-14

## 2019-06-17 DIAGNOSIS — M25.569 CHRONIC KNEE PAIN, UNSPECIFIED LATERALITY: Primary | ICD-10-CM

## 2019-06-17 DIAGNOSIS — G89.29 CHRONIC KNEE PAIN, UNSPECIFIED LATERALITY: Primary | ICD-10-CM

## 2019-06-17 NOTE — TELEPHONE ENCOUNTER
Pt said she hurt her knee a year ago and has been having trouble with it recently has been walking with a cane since last Thursday because of pain. Did see Dr mcclure about this when happened

## 2019-07-08 ENCOUNTER — TRANSCRIBE ORDERS (OUTPATIENT)
Dept: ADMINISTRATIVE | Facility: HOSPITAL | Age: 79
End: 2019-07-08

## 2019-07-08 ENCOUNTER — LAB (OUTPATIENT)
Dept: LAB | Facility: HOSPITAL | Age: 79
End: 2019-07-08

## 2019-07-08 DIAGNOSIS — Z85.850 HISTORY OF THYROID CANCER: Primary | ICD-10-CM

## 2019-07-08 DIAGNOSIS — Z85.850 HISTORY OF THYROID CANCER: ICD-10-CM

## 2019-07-08 LAB — TSH SERPL DL<=0.05 MIU/L-ACNC: 0.21 MIU/ML (ref 0.27–4.2)

## 2019-07-08 PROCEDURE — 84443 ASSAY THYROID STIM HORMONE: CPT | Performed by: OTOLARYNGOLOGY

## 2019-07-08 PROCEDURE — 36415 COLL VENOUS BLD VENIPUNCTURE: CPT

## 2019-08-08 ENCOUNTER — OFFICE VISIT (OUTPATIENT)
Dept: ORTHOPEDIC SURGERY | Facility: CLINIC | Age: 79
End: 2019-08-08

## 2019-08-08 VITALS — BODY MASS INDEX: 31.36 KG/M2 | WEIGHT: 177 LBS | HEIGHT: 63 IN | TEMPERATURE: 98 F

## 2019-08-08 DIAGNOSIS — M25.561 CHRONIC PAIN OF RIGHT KNEE: Primary | ICD-10-CM

## 2019-08-08 DIAGNOSIS — G89.29 CHRONIC PAIN OF RIGHT KNEE: Primary | ICD-10-CM

## 2019-08-08 DIAGNOSIS — M17.11 PRIMARY OSTEOARTHRITIS OF RIGHT KNEE: ICD-10-CM

## 2019-08-08 PROCEDURE — 99202 OFFICE O/P NEW SF 15 MIN: CPT | Performed by: ORTHOPAEDIC SURGERY

## 2019-08-08 PROCEDURE — 73562 X-RAY EXAM OF KNEE 3: CPT | Performed by: ORTHOPAEDIC SURGERY

## 2019-08-08 NOTE — PROGRESS NOTES
Patient: Tiff Del Rio  YOB: 1940 78 y.o. female  Medical Record Number: [id number]    Chief Complaints:   Chief Complaint   Patient presents with   • Right Knee - Establish Care, Pain       History of Present Illness:Tiff Del Rio is a 78 y.o. female who presents with right knee pain - fell 1 year ago carry a ladder. ER-  No fracture. Has used a brace and cane. Started getting worse 2 months ago-  But it has improved a lot since then.    Allergies:   Allergies   Allergen Reactions   • Codeine Phosphate GI Intolerance       Medications:   Current Outpatient Medications   Medication Sig Dispense Refill   • amLODIPine (NORVASC) 5 MG tablet Take 1 tablet by mouth Daily. 30 tablet 1   • aspirin 81 MG chewable tablet Chew 1 tablet Daily.     • atorvastatin (LIPITOR) 80 MG tablet Take 1 tablet by mouth Every Night. 30 tablet 0   • exemestane (AROMASIN) 25 MG chemo tablet Take 1 tablet by mouth Daily. 90 tablet 0   • furosemide (LASIX) 20 MG tablet Take 1 tablet by mouth Daily. 90 tablet 1   • levothyroxine (SYNTHROID) 88 MCG tablet Take 1 tablet by mouth Daily. 30 tablet 0   • metFORMIN (GLUCOPHAGE) 500 MG tablet Take 1 tablet by mouth Daily With Breakfast. 90 tablet 0   • metoprolol succinate XL (TOPROL-XL) 200 MG 24 hr tablet Take 1 tablet by mouth Daily. 90 tablet 0   • mupirocin (BACTROBAN) 2 % ointment Apply  topically to the appropriate area as directed 3 (Three) Times a Day. 15 g 0   • olmesartan-hydrochlorothiazide (BENICAR HCT) 40-12.5 MG per tablet Take 1 tablet by mouth Daily. 90 tablet 0   • omeprazole (priLOSEC) 40 MG capsule        Current Facility-Administered Medications   Medication Dose Route Frequency Provider Last Rate Last Dose   • cyanocobalamin injection 1,000 mcg  1,000 mcg Intramuscular Q28 Days Betsey Harley APRN   1,000 mcg at 05/28/19 1609   • cyanocobalamin injection 1,000 mcg  1,000 mcg Intramuscular Q28 Days Betsey Harley APRN   1,000 mcg at  "06/03/19 1403         The following portions of the patient's history were reviewed and updated as appropriate: allergies, current medications, past family history, past medical history, past social history, past surgical history and problem list.    Review of Systems:   A 14 point review of systems was performed. All systems negative except pertinent positives/negative listed in HPI above    Physical Exam:   Vitals:    08/08/19 1556   Temp: 98 °F (36.7 °C)   Weight: 80.3 kg (177 lb)   Height: 160 cm (63\")   PainSc:   1       General: A and O x 3, ASA, NAD    SCLERA:    Normal    DENTITION:   Normal   Knee:  right    ALIGNMENT:     Valgus      GAIT:    Antalgic    SKIN:    No abnormality    RANGE OF MOTION:   0  -  120   DEG    STRENGTH:   4  / 5    LIGAMENTS:    No varus / valgus instability.   Negative  Lachman.    MENISCUS:     Negative   Mali       DISTAL PULSES:    Paplable    DISTAL SENSATION :   Intact    LYMPHATICS:     No   lymphadenopathy    OTHER:          - Positive   effusion      - Crepitance with ROM       Radiology:  Xrays 3views  Right knee (ap,lateral, sunrise) were ordered and reviewed for evaluation of knee pain demonstrating advanced valgus osteoarthritis with bone on bone articulation, subchondral cysts, and periarticular osteophytes. There are no previous films for comparision.    Assessment/Plan: Right knee lateral compartment osteoarthritis feeling much better now she likely had a degenerative meniscus tear in May when she reinjured it but is essentially improved at this point.  I explained to her there is a relatively high likelihood that she could have episodes like this in the future if so she will call we will get her back in and possibly consider injection.  For now she should work on weight control and leg strengthening exercises through low demand activity.  I will be happy to see her back at any point in the future.      Kb Rodríguez MD  8/8/2019  "

## 2019-08-12 ENCOUNTER — OFFICE VISIT (OUTPATIENT)
Dept: NEUROLOGY | Facility: CLINIC | Age: 79
End: 2019-08-12

## 2019-08-12 VITALS
BODY MASS INDEX: 30.22 KG/M2 | HEART RATE: 76 BPM | DIASTOLIC BLOOD PRESSURE: 88 MMHG | WEIGHT: 177 LBS | SYSTOLIC BLOOD PRESSURE: 152 MMHG | OXYGEN SATURATION: 98 % | HEIGHT: 64 IN

## 2019-08-12 DIAGNOSIS — I10 ESSENTIAL HYPERTENSION: ICD-10-CM

## 2019-08-12 DIAGNOSIS — G45.9 TIA (TRANSIENT ISCHEMIC ATTACK): Primary | ICD-10-CM

## 2019-08-12 PROCEDURE — 99213 OFFICE O/P EST LOW 20 MIN: CPT | Performed by: NURSE PRACTITIONER

## 2019-08-12 NOTE — PROGRESS NOTES
DOS: 2019  NAME: Tiff Del Rio   : 1940  PCP: Betsey Harley APRN    Chief Complaint   Patient presents with   • Stroke   CC: Transient ischemic attack    Referring MD: No ref. provider found    Neurological Problem:  78 y.o. right-handed female with a Hx of diabetes, hypertension, breast cancer, thyroid cancer, ICH in the setting of hypertension 12 years ago who presents today for TIA follow-up.  She is not accompanied by any family.  History is provided by the patient and review of records as summarized below.    Interval History:  Ms. Guaman is a 78-year-old female who presented to [facility] on 2019 with complaints of difficulty getting words out with questionable associated right eye droop and a mild headache that resolved within 30 minutes.  Upon arrival to the ER CT head obtained and revealed no acute intracranial abnormalities.  MRI/MRA head and neck  also obtained and revealed no acute  stroke or intracranial abnormalities.  She was noted to be hypertensive 213/100 and was treated accordingly. 2D echo revealed a borderline increased left atrial volume, saline test negative, no mention of PFO, LV function normal, EF 68%, no aortic valve stenosis present.  She was started on a full dose aspirin but this was decreased to 81 mg.  Our recommendations were to continue aspirin 81 mg and high-dose statin Lipitor 80 mg at discharge as well as placing a ZIO Patch to monitor for cardiac arrhythmias; specifically atrial fibrillation. Per review of discharge summary she was sent home on aspirin 81 mg, Lipitor 80 mg, as well as B12 replacement IM injections and plans to recheck her thyroid function 6 weeks post discharge given her low TSH.  She did have her ZIO Patch placed prior to discharge.    She presents today and remains on aspirin 81 mg and Lipitor 80 mg.  She reports that she just now sent in her ZIO Patch last week. She recently visited an urgent care facility on  "8/10 due to developing 2 blisters on her left leg and small papules on her right leg after working in her garden.  She was prescribed a steroid pack and sent home, this has since resolved.  She denies any new weakness, numbness, speech or visual disturbances, or headaches since being discharged from the hospital.  She reports she has seen Dr. Hameed since discharge to follow-up on her thyroid function.  She also received 3 IM B12 injections outpatient post discharge.  She is back to her normal self and daily activities with no deficits.  She denies smoking and has occasional glass of wine.  She reports that her blood pressure has been monitored by her primary care provider, and she is going to be seeing a new primary care provider as her old physician has retired.    Review of Systems:        Review of Systems   Constitutional: Negative.    HENT: Negative.    Eyes: Negative.    Respiratory: Positive for cough.    Cardiovascular: Negative.    Gastrointestinal: Negative.    Endocrine: Negative.    Genitourinary: Negative.    Musculoskeletal: Positive for joint swelling.   Skin: Negative.    Allergic/Immunologic: Negative.    Neurological: Negative.    Hematological: Negative.    Psychiatric/Behavioral: Negative.        \"The following portions of the patient's history were reviewed and updated as appropriate: allergies, current medications, past family history, past medical history, past social history, past surgical history and problem list.\"    Review and Interpretation of Imaging:    CT/CTA/CTP: No acute intracranial abnormalities.    MRI/MRA: No acute stroke or significant stenosis within the great vessels.    TTE: borderline increased left atrial volume, saline test negative, no mention of PFO, LV function normal, EF 68%, no aortic valve stenosis present    Holter monitor: Pending    Laboratory Results:             Lab Results   Component Value Date    HGBA1C 5.36 05/13/2019     Lab Results   Component Value Date "    CHOL 141 05/13/2019     Lab Results   Component Value Date    HDL 31 (L) 05/13/2019     Lab Results   Component Value Date    LDL 76 05/13/2019     Lab Results   Component Value Date    TRIG 168 (H) 05/13/2019     No components found for: CHOLHDL  No results found for: RPR  Lab Results   Component Value Date    TSH 0.212 (L) 07/08/2019     Lab Results   Component Value Date    BGFAPEQX62 172 (L) 05/13/2019     Lab Results   Component Value Date    GLUCOSE 91 05/13/2019    BUN 10 05/13/2019    CREATININE 0.70 05/13/2019    EGFRIFNONA 81 05/13/2019    EGFRIFAFRI 69 01/16/2019    BCR 14.3 05/13/2019    K 3.5 05/13/2019    CO2 24.2 05/13/2019    CALCIUM 8.4 (L) 05/13/2019    PROTENTOTREF 7.4 01/16/2019    ALBUMIN 3.50 05/13/2019    LABIL2 1.4 01/16/2019    AST 14 05/13/2019    ALT 10 05/13/2019     Lab Results   Component Value Date    WBC 7.88 05/13/2019    HGB 11.7 (L) 05/13/2019    HCT 36.7 05/13/2019    MCV 88.6 05/13/2019     05/13/2019     No results found for: INR, PROTIME    Physical Examination:   mRS:   General Appearance:   Well developed, well nourished, well groomed, alert, and cooperative.  HEENT: Normocephalic. Atraumatic. PERRL. Normal fundoscopic exam including normal retina  Cardiac: Regular rate and rhythm.   Extremities:    Equal pulses. No edema.  Respiratory:   Even and unlabored.    Neurological examination:  Higher Integrative  Function: Oriented to time, place and person. Normal registration, recall, attention span and concentration. Normal language including comprehension, spontaneous speech, repetition, reading, writing, naming and vocabulary. No neglect with normal visual-spatial function and construction. Normal fund of knowledge and higher integrative function.  CN II: Pupils are equal, round, and reactive to light. Normal visual acuity and visual fields.    CN III IV VI: Extraocular movements are full without nystagmus.   CN V: Normal facial sensation and strength of muscles of  mastication.  CN VII: Facial movements are symmetric. No weakness.  CN VIII:   Auditory acuity is normal.  CN IX & X:   Symmetric palatal movement.  CN XI: Sternocleidomastoid and trapezius are normal.  No weakness.  CN XII:   The tongue is midline.  No atrophy or fasciculations.  Motor: Normal muscle strength, bulk and tone in upper and lower extremities.  No fasciculations, rigidity, spasticity, or abnormal movements.  Reflexes: 1+ in the upper and lower extremities. Plantar responses are flexor.  Sensation: Normal to light touch, vibration and temperature, and proprioception in arms and legs. Normal graphesthesia   Station and Gait: Normal gait and station.    Coordination: Finger to nose test shows no dysmetria.  Rapid alternating movements are normal.      Impression/Assessment:    Mrs. Del Rio is doing well following her presumed left hemispheric TIA she suffered in May 2019.  The etiology of this event is unclear as her vessel imaging did not show any significant stenosis to explain event.  She was discharged home with a ZIO Patch and just recently sent it in last week, results are pending.  She has been maintained on aspirin 81 mg and Lipitor 80 mg and has tolerated both well.  She denies any new stroke/TIA symptoms since discharge from the hospital in May 2019.  Her blood pressure is elevated today and from my review has been elevated with her past 2 doctor's visits.  I encouraged her to keep a log of her blood pressure readings and report them with her next appointment with her new PCP.  We discussed at length her personal risk factor for stroke and the importance of risk factor control for stroke prevention, including blood pressure and cholesterol control.  She will monitor blood pressure regularly and follow-up with PCP for continued cholesterol surveillance.  We discussed stroke/TIA symptoms and importance of calling 911 if she were to experience any of these.  Follow-up in 1 year, sooner if symptoms  warrant.    Plan:     Continue aspirin 81 mg  Continue Lipitor 80 mg, repeat lipid panel with next PCP visit  Monitor blood pressure regularly report readings to PCP with next appointment  Keep well-hydrated  Encourage regular physical activity   Blood pressure control to <130/80   Goal LDL <70-recommend high dose statins-    Serum glucose < 140   Call 911 for stroke any stroke symptoms   Follow-up in 1 year  Tiff was seen today for stroke.    Diagnoses and all orders for this visit:    TIA (transient ischemic attack)    Essential hypertension        MDM  Reviewed:  previous chart, nursing note and vitals  Reviewed previous: labs, MRI and CT scan  Interpretation: labs, MRI and CT scan        BROOKLYN Salcido

## 2019-08-13 ENCOUNTER — PATIENT MESSAGE (OUTPATIENT)
Dept: FAMILY MEDICINE CLINIC | Facility: CLINIC | Age: 79
End: 2019-08-13

## 2019-08-15 PROCEDURE — 0298T HOLTER MONITOR - 72 HOUR UP TO 21 DAY: CPT | Performed by: INTERNAL MEDICINE

## 2019-10-10 ENCOUNTER — OFFICE VISIT (OUTPATIENT)
Dept: ORTHOPEDIC SURGERY | Facility: CLINIC | Age: 79
End: 2019-10-10

## 2019-10-10 VITALS — BODY MASS INDEX: 30.56 KG/M2 | WEIGHT: 179 LBS | TEMPERATURE: 97.2 F | HEIGHT: 64 IN

## 2019-10-10 DIAGNOSIS — M17.11 PRIMARY OSTEOARTHRITIS OF RIGHT KNEE: Primary | ICD-10-CM

## 2019-10-10 PROCEDURE — 20610 DRAIN/INJ JOINT/BURSA W/O US: CPT | Performed by: ORTHOPAEDIC SURGERY

## 2019-10-10 PROCEDURE — 99213 OFFICE O/P EST LOW 20 MIN: CPT | Performed by: ORTHOPAEDIC SURGERY

## 2019-10-10 RX ORDER — METHYLPREDNISOLONE ACETATE 80 MG/ML
80 INJECTION, SUSPENSION INTRA-ARTICULAR; INTRALESIONAL; INTRAMUSCULAR; SOFT TISSUE
Status: COMPLETED | OUTPATIENT
Start: 2019-10-10 | End: 2019-10-10

## 2019-10-10 RX ADMIN — METHYLPREDNISOLONE ACETATE 80 MG: 80 INJECTION, SUSPENSION INTRA-ARTICULAR; INTRALESIONAL; INTRAMUSCULAR; SOFT TISSUE at 20:35

## 2019-10-11 NOTE — PROGRESS NOTES
Patient: Tiff Del Rio  YOB: 1940 79 y.o. female  Medical Record Number: [id number]    Chief Complaints:   Chief Complaint   Patient presents with   • Right Knee - Follow-up, Pain       History of Present Illness:Tiff Del Roi is a 79 y.o. female who presents for follow-up of right knee pain.  She has known arthritis.  She was doing reasonably well until a few weeks ago when she had a funny stop and has had significant lateral aching pain which she describes as moderate to severe.  She is using a cane.    Allergies:   Allergies   Allergen Reactions   • Codeine Unknown (See Comments)     Other reaction(s): GI Intolerance   • Codeine Phosphate GI Intolerance       Medications:   Current Outpatient Medications   Medication Sig Dispense Refill   • amLODIPine (NORVASC) 5 MG tablet Take 1 tablet by mouth Daily. 30 tablet 1   • aspirin 81 MG chewable tablet Chew 1 tablet Daily.     • atorvastatin (LIPITOR) 80 MG tablet Take 1 tablet by mouth Every Night. 30 tablet 0   • exemestane (AROMASIN) 25 MG chemo tablet Take 1 tablet by mouth Daily. 90 tablet 0   • furosemide (LASIX) 20 MG tablet Take 1 tablet by mouth Daily. 90 tablet 1   • levothyroxine (SYNTHROID) 88 MCG tablet Take 1 tablet by mouth Daily. 30 tablet 0   • metFORMIN (GLUCOPHAGE) 500 MG tablet Take 1 tablet by mouth Daily With Breakfast. 90 tablet 0   • metoprolol succinate XL (TOPROL-XL) 200 MG 24 hr tablet Take 1 tablet by mouth Daily. 90 tablet 0   • olmesartan-hydrochlorothiazide (BENICAR HCT) 40-12.5 MG per tablet Take 1 tablet by mouth Daily. 90 tablet 0   • omeprazole (priLOSEC) 40 MG capsule        Current Facility-Administered Medications   Medication Dose Route Frequency Provider Last Rate Last Dose   • cyanocobalamin injection 1,000 mcg  1,000 mcg Intramuscular Q28 Days Betsey Harley APRN   1,000 mcg at 05/28/19 1609   • cyanocobalamin injection 1,000 mcg  1,000 mcg Intramuscular Q28 Days Betsey Harley APRN  "  1,000 mcg at 06/03/19 1403         The following portions of the patient's history were reviewed and updated as appropriate: allergies, current medications, past family history, past medical history, past social history, past surgical history and problem list.    Review of Systems:   A 14 point review of systems was performed. All systems negative except pertinent positives/negative listed in HPI above    Physical Exam:   Vitals:    10/10/19 1627   Temp: 97.2 °F (36.2 °C)   Weight: 81.2 kg (179 lb)   Height: 161.3 cm (63.5\")       General: A and O x 3, ASA, NAD    SCLERA:    Normal    DENTITION:   Normal  Knee:  right    ALIGNMENT:     Valgus      GAIT:    Antalgic    SKIN:    No abnormality    RANGE OF MOTION:   5  -  120   DEG    STRENGTH:   4  / 5    LIGAMENTS:    No varus / valgus instability.   Negative  Lachman.    MENISCUS:     Negative   Mali       DISTAL PULSES:    Paplable    DISTAL SENSATION :   Intact    LYMPHATICS:     No   lymphadenopathy    OTHER:          - Positive   effusion      - Crepitance with ROM     Radiology:  Xrays 3views right knee (ap,lateral, sunrise) taken previously demonstratingadvanced valgus osteoarthritis with bone on bone articulation, subchondral cysts, and periarticular osteophytes      Assessment/Plan:  Right knee valgus osteoarthritis with worsening pain.  I injected the knee as below.  Instructed her on quad strengthening exercises.  I will see her back as needed.  Large Joint Arthrocentesis: R knee  Date/Time: 10/10/2019 8:35 PM  Consent given by: patient  Site marked: site marked  Timeout: Immediately prior to procedure a time out was called to verify the correct patient, procedure, equipment, support staff and site/side marked as required   Supporting Documentation  Indications: pain and joint swelling   Procedure Details  Location: knee - R knee  Preparation: Patient was prepped and draped in the usual sterile fashion  Needle size: 22 G  Approach: " anterolateral  Medications administered: 80 mg methylPREDNISolone acetate 80 MG/ML; 2 mL lidocaine (cardiac)  Patient tolerance: patient tolerated the procedure well with no immediate complications

## 2020-05-01 ENCOUNTER — TRANSCRIBE ORDERS (OUTPATIENT)
Dept: ADMINISTRATIVE | Facility: HOSPITAL | Age: 80
End: 2020-05-01

## 2020-05-01 DIAGNOSIS — E05.10 THYROID NODULE, TOXIC OR WITH HYPERTHYROIDISM: Primary | ICD-10-CM

## 2020-06-29 ENCOUNTER — HOSPITAL ENCOUNTER (OUTPATIENT)
Dept: ULTRASOUND IMAGING | Facility: HOSPITAL | Age: 80
Discharge: HOME | End: 2020-06-29
Admitting: OTOLARYNGOLOGY

## 2020-06-29 DIAGNOSIS — E05.10 THYROID NODULE, TOXIC OR WITH HYPERTHYROIDISM: ICD-10-CM

## 2020-06-29 PROCEDURE — 76536 US EXAM OF HEAD AND NECK: CPT

## 2020-09-25 ENCOUNTER — CLINICAL SUPPORT (OUTPATIENT)
Dept: ORTHOPEDIC SURGERY | Facility: CLINIC | Age: 80
End: 2020-09-25

## 2020-09-25 VITALS — WEIGHT: 179 LBS | BODY MASS INDEX: 30.56 KG/M2 | TEMPERATURE: 96.6 F | HEIGHT: 64 IN

## 2020-09-25 DIAGNOSIS — M17.11 PRIMARY OSTEOARTHRITIS OF RIGHT KNEE: Primary | ICD-10-CM

## 2020-09-25 PROCEDURE — 99213 OFFICE O/P EST LOW 20 MIN: CPT | Performed by: NURSE PRACTITIONER

## 2020-09-25 PROCEDURE — 20610 DRAIN/INJ JOINT/BURSA W/O US: CPT | Performed by: NURSE PRACTITIONER

## 2020-09-25 RX ORDER — METHYLPREDNISOLONE ACETATE 80 MG/ML
80 INJECTION, SUSPENSION INTRA-ARTICULAR; INTRALESIONAL; INTRAMUSCULAR; SOFT TISSUE
Status: COMPLETED | OUTPATIENT
Start: 2020-09-25 | End: 2020-09-25

## 2020-09-25 RX ORDER — ERGOCALCIFEROL 1.25 MG/1
50000 CAPSULE ORAL
COMMUNITY
Start: 2019-12-16

## 2020-09-25 RX ADMIN — METHYLPREDNISOLONE ACETATE 80 MG: 80 INJECTION, SUSPENSION INTRA-ARTICULAR; INTRALESIONAL; INTRAMUSCULAR; SOFT TISSUE at 09:08

## 2020-09-25 NOTE — PROGRESS NOTES
Patient: Tiff Del Rio  YOB: 1940 79 y.o. female  Medical Record Number: [id number]    Chief Complaints:   Chief Complaint   Patient presents with   • Right Knee - Pain, Follow-up       History of Present Illness:Tiff Del Rio is a 79 y.o. female who presents with complaints of right knee pain.  The patient has had increased pain over the last several weeks, denies any recent injury.  Describes the knee pain as a moderate sometimes severe intermittent ache worse with standing walking, better with rest    Allergies:   Allergies   Allergen Reactions   • Codeine Unknown (See Comments)     Other reaction(s): GI Intolerance   • Codeine Phosphate GI Intolerance       Medications:   Current Outpatient Medications   Medication Sig Dispense Refill   • amLODIPine (NORVASC) 5 MG tablet Take 1 tablet by mouth Daily. 30 tablet 1   • atorvastatin (LIPITOR) 80 MG tablet Take 1 tablet by mouth Every Night. 30 tablet 0   • exemestane (AROMASIN) 25 MG chemo tablet Take 1 tablet by mouth Daily. 90 tablet 0   • furosemide (LASIX) 20 MG tablet Take 1 tablet by mouth Daily. 90 tablet 1   • levothyroxine (SYNTHROID) 88 MCG tablet Take 1 tablet by mouth Daily. 30 tablet 0   • metFORMIN (GLUCOPHAGE) 500 MG tablet Take 1 tablet by mouth Daily With Breakfast. 90 tablet 0   • metoprolol succinate XL (TOPROL-XL) 200 MG 24 hr tablet Take 1 tablet by mouth Daily. 90 tablet 0   • olmesartan-hydrochlorothiazide (BENICAR HCT) 40-12.5 MG per tablet Take 1 tablet by mouth Daily. 90 tablet 0   • omeprazole (priLOSEC) 40 MG capsule      • vitamin D (ERGOCALCIFEROL) 1.25 MG (44945 UT) capsule capsule Take 50,000 Units by mouth.     • aspirin 81 MG chewable tablet Chew 1 tablet Daily.       Current Facility-Administered Medications   Medication Dose Route Frequency Provider Last Rate Last Dose   • cyanocobalamin injection 1,000 mcg  1,000 mcg Intramuscular Q28 Days Betsey Harley APRN   1,000 mcg at 05/28/19 1609   •  "cyanocobalamin injection 1,000 mcg  1,000 mcg Intramuscular Q28 Days Betsey Harley, BROOKLYN   1,000 mcg at 06/03/19 1403         The following portions of the patient's history were reviewed and updated as appropriate: allergies, current medications, past family history, past medical history, past social history, past surgical history and problem list.    Review of Systems:   A 14 point review of systems was performed. All systems negative except pertinent positives/negative listed in HPI above    Physical Exam:   Vitals:    09/25/20 1506   Temp: 96.6 °F (35.9 °C)   Weight: 81.2 kg (179 lb)   Height: 161.3 cm (63.5\")   PainSc:   7       General: A and O x 3, ASA, NAD    SCLERA:    Normal    DENTITION:   Normal  Skin clear no unusual lesions noted  Right knee patient has no appreciable effusion limited range of motion secondary to pain calf soft nontender    Radiology:  Xrays previous x-rays of the right knee were reviewed and patient does have significant arthritic changes.  No compared to views available    Assessment/Plan:  Osteoarthritis right knee    Patient I discussed treatment options, she would like to proceed with right knee cortisone injection, prior to injection risks were discussed including pain infection.  Patient verbalized understanding would like to proceed with injection she will continue with physical therapy exercises we will see her back as needed    Large Joint Arthrocentesis: R knee  Date/Time: 9/25/2020 9:08 AM  Consent given by: patient  Site marked: site marked  Timeout: Immediately prior to procedure a time out was called to verify the correct patient, procedure, equipment, support staff and site/side marked as required   Supporting Documentation  Indications: pain   Procedure Details  Location: knee - R knee  Needle gauge: 21.  Approach: anteromedial  Medications administered: 2 mL lidocaine (cardiac); 80 mg methylPREDNISolone acetate 80 MG/ML  Patient tolerance: patient tolerated " the procedure well with no immediate complications          At the conclusion of the injection I discussed the importance of continued quad strengthening exercises on a daily basis. I will see the patient back if the symptoms should fail to improve or worsen.

## 2021-03-04 DIAGNOSIS — Z23 IMMUNIZATION DUE: ICD-10-CM

## 2021-03-12 ENCOUNTER — IMMUNIZATION (OUTPATIENT)
Dept: VACCINE CLINIC | Facility: HOSPITAL | Age: 81
End: 2021-03-12

## 2021-03-12 DIAGNOSIS — Z23 IMMUNIZATION DUE: ICD-10-CM

## 2021-03-12 PROCEDURE — 0001A: CPT | Performed by: INTERNAL MEDICINE

## 2021-03-12 PROCEDURE — 91300 HC SARSCOV02 VAC 30MCG/0.3ML IM: CPT | Performed by: INTERNAL MEDICINE

## 2021-04-02 ENCOUNTER — IMMUNIZATION (OUTPATIENT)
Dept: VACCINE CLINIC | Facility: HOSPITAL | Age: 81
End: 2021-04-02

## 2021-04-02 PROCEDURE — 91300 HC SARSCOV02 VAC 30MCG/0.3ML IM: CPT | Performed by: INTERNAL MEDICINE

## 2021-04-02 PROCEDURE — 0002A: CPT | Performed by: INTERNAL MEDICINE

## 2021-04-29 ENCOUNTER — TELEPHONE (OUTPATIENT)
Dept: ORTHOPEDIC SURGERY | Facility: CLINIC | Age: 81
End: 2021-04-29

## 2021-04-29 NOTE — TELEPHONE ENCOUNTER
Caller: JEN DIALLO     Relationship to patient: SELF    Best call back number: [phone]    Chief complaint: R KNEE     Type of visit: FOLLOW UP    Additional notes:PT WOULD LIKE TO SCHEDULE FOLLOW UP APPT FOR POSSIBLE CORTIZONE INJECTIONS- HUB UNABLE TO SCHED APPT UNTIL 08/03/21 W/ JIM- ATTEMPTED TO WARM TRANSFER, PLEASE CALL PATIENT BACK TO SCHEDULE-

## 2021-05-04 ENCOUNTER — OFFICE VISIT (OUTPATIENT)
Dept: ORTHOPEDIC SURGERY | Facility: CLINIC | Age: 81
End: 2021-05-04

## 2021-05-04 VITALS — WEIGHT: 179 LBS | TEMPERATURE: 96.6 F | BODY MASS INDEX: 31.71 KG/M2 | HEIGHT: 63 IN

## 2021-05-04 DIAGNOSIS — M17.11 PRIMARY OSTEOARTHRITIS OF RIGHT KNEE: ICD-10-CM

## 2021-05-04 DIAGNOSIS — R52 PAIN: Primary | ICD-10-CM

## 2021-05-04 PROCEDURE — 99213 OFFICE O/P EST LOW 20 MIN: CPT | Performed by: NURSE PRACTITIONER

## 2021-05-04 PROCEDURE — 73562 X-RAY EXAM OF KNEE 3: CPT | Performed by: NURSE PRACTITIONER

## 2021-05-04 PROCEDURE — 20610 DRAIN/INJ JOINT/BURSA W/O US: CPT | Performed by: NURSE PRACTITIONER

## 2021-05-04 RX ORDER — METHYLPREDNISOLONE ACETATE 80 MG/ML
80 INJECTION, SUSPENSION INTRA-ARTICULAR; INTRALESIONAL; INTRAMUSCULAR; SOFT TISSUE
Status: COMPLETED | OUTPATIENT
Start: 2021-05-04 | End: 2021-05-04

## 2021-05-04 RX ADMIN — METHYLPREDNISOLONE ACETATE 80 MG: 80 INJECTION, SUSPENSION INTRA-ARTICULAR; INTRALESIONAL; INTRAMUSCULAR; SOFT TISSUE at 16:34

## 2021-05-04 NOTE — PROGRESS NOTES
Patient: Tiff Del Rio  YOB: 1940 80 y.o. female  Medical Record Number: [id number]    Chief Complaints:   Chief Complaint   Patient presents with   • Right Knee - Follow-up, Pain       History of Present Illness:Tiff Del Rio is a 80 y.o. female who presents with complaints of right knee pain.  Patient has had increased pain over the last 5 to 6 days, she denies any injury.  She describes the knee pain as a moderate sometimes severe intermittent ache worse with walking better with rest and Tylenol.    Allergies:   Allergies   Allergen Reactions   • Codeine Unknown (See Comments)     Other reaction(s): GI Intolerance   • Codeine Phosphate GI Intolerance       Medications:   Current Outpatient Medications   Medication Sig Dispense Refill   • amLODIPine (NORVASC) 5 MG tablet Take 1 tablet by mouth Daily. 30 tablet 1   • aspirin 81 MG chewable tablet Chew 1 tablet Daily.     • atorvastatin (LIPITOR) 80 MG tablet Take 1 tablet by mouth Every Night. 30 tablet 0   • exemestane (AROMASIN) 25 MG chemo tablet Take 1 tablet by mouth Daily. 90 tablet 0   • furosemide (LASIX) 20 MG tablet Take 1 tablet by mouth Daily. 90 tablet 1   • levothyroxine (SYNTHROID) 88 MCG tablet Take 1 tablet by mouth Daily. 30 tablet 0   • metFORMIN (GLUCOPHAGE) 500 MG tablet Take 1 tablet by mouth Daily With Breakfast. 90 tablet 0   • metoprolol succinate XL (TOPROL-XL) 200 MG 24 hr tablet Take 1 tablet by mouth Daily. 90 tablet 0   • olmesartan-hydrochlorothiazide (BENICAR HCT) 40-12.5 MG per tablet Take 1 tablet by mouth Daily. 90 tablet 0   • omeprazole (priLOSEC) 40 MG capsule      • vitamin D (ERGOCALCIFEROL) 1.25 MG (43580 UT) capsule capsule Take 50,000 Units by mouth.       Current Facility-Administered Medications   Medication Dose Route Frequency Provider Last Rate Last Admin   • cyanocobalamin injection 1,000 mcg  1,000 mcg Intramuscular Q28 Days Betsey Harley APRN   1,000 mcg at 05/28/19 1609   •  "cyanocobalamin injection 1,000 mcg  1,000 mcg Intramuscular Q28 Days Betsey Harley [place]   1,000 mcg at 06/03/19 1403         The following portions of the patient's history were reviewed and updated as appropriate: allergies, current medications, past family history, past medical history, past social history, past surgical history and problem list.    Review of Systems:   A 14 point review of systems was performed. All systems negative except pertinent positives/negative listed in HPI above    Physical Exam:   Vitals:    05/04/21 1546   Temp: 96.6 °F (35.9 °C)   Weight: 81.2 kg (179 lb)   Height: 160 cm (63\")   PainSc:   6       General: A and O x 3, ASA, NAD    SCLERA:    Normal    DENTITION:   Normal  Skin clear no unusual lesions noted  Right knee patient has trace amount of effusion noted with 110 degrees flexion neutral extension with a positive [place] negative Lockman calf soft and nontender       Radiology:  Xrays 3views (ap,lateral, sunrise) right knee were ordered and reviewed today secondary to pain show bone-on-bone end-stage osteoarthritis.  Compared to views show definite progression in arthritic changes    Assessment/Plan: Osteoarthritis right knee    Patient discussed options, she would like to proceed with right knee aspiration, cortisone injection, ice, elevation, compression, Tylenol as needed, we will see her back in 3 months if needed    Large Joint Arthrocentesis: R knee  Date/Time: 5/4/2021 4:34 PM  Consent given by: patient  Site marked: site marked  Timeout: Immediately prior to procedure a time out was called to verify the correct patient, procedure, equipment, support staff and site/side marked as required   Supporting Documentation  Indications: pain and joint swelling   Procedure Details  Location: knee - R knee  Preparation: Patient was prepped and draped in the usual sterile fashion  Needle size: 22 G  Approach: anterolateral  Medications administered: 80 mg " methylPREDNISolone acetate 80 MG/ML; 2 mL lidocaine (cardiac)  Aspirate amount: 36 mL  Aspirate: clear  Patient tolerance: patient tolerated the procedure well with no immediate complications            Snady Castaneda, APRN  5/4/2021

## 2021-08-06 ENCOUNTER — CLINICAL SUPPORT (OUTPATIENT)
Dept: ORTHOPEDIC SURGERY | Facility: CLINIC | Age: 81
End: 2021-08-06

## 2021-08-06 VITALS — BODY MASS INDEX: 30.3 KG/M2 | WEIGHT: 171 LBS | TEMPERATURE: 97.3 F | HEIGHT: 63 IN

## 2021-08-06 DIAGNOSIS — M17.11 PRIMARY OSTEOARTHRITIS OF RIGHT KNEE: Primary | ICD-10-CM

## 2021-08-06 PROCEDURE — 20610 DRAIN/INJ JOINT/BURSA W/O US: CPT | Performed by: NURSE PRACTITIONER

## 2021-08-06 RX ORDER — METHYLPREDNISOLONE ACETATE 80 MG/ML
80 INJECTION, SUSPENSION INTRA-ARTICULAR; INTRALESIONAL; INTRAMUSCULAR; SOFT TISSUE
Status: COMPLETED | OUTPATIENT
Start: 2021-08-06 | End: 2021-08-06

## 2021-08-06 RX ORDER — LIDOCAINE HYDROCHLORIDE 20 MG/ML
2 INJECTION, SOLUTION EPIDURAL; INFILTRATION; INTRACAUDAL; PERINEURAL
Status: COMPLETED | OUTPATIENT
Start: 2021-08-06 | End: 2021-08-06

## 2021-08-06 RX ADMIN — LIDOCAINE HYDROCHLORIDE 2 ML: 20 INJECTION, SOLUTION EPIDURAL; INFILTRATION; INTRACAUDAL; PERINEURAL at 14:38

## 2021-08-06 RX ADMIN — METHYLPREDNISOLONE ACETATE 80 MG: 80 INJECTION, SUSPENSION INTRA-ARTICULAR; INTRALESIONAL; INTRAMUSCULAR; SOFT TISSUE at 14:38

## 2021-08-06 NOTE — PROGRESS NOTES
8/6/2021    Tiff Del Rio is here today for worsening knee pain. Pt has undergone injection of the knee in the past with good resolution of symptoms. Pt is requesting a repeat injection.     KNEE Injection Procedure Note:    Large Joint Arthrocentesis: R knee  Date/Time: 8/6/2021 2:38 PM  Consent given by: patient  Site marked: site marked  Timeout: Immediately prior to procedure a time out was called to verify the correct patient, procedure, equipment, support staff and site/side marked as required   Supporting Documentation  Indications: pain and joint swelling   Procedure Details  Location: knee - R knee  Preparation: Patient was prepped and draped in the usual sterile fashion  Needle size: 22 G  Approach: anterolateral  Medications administered: 80 mg methylPREDNISolone acetate 80 MG/ML; 2 mL lidocaine PF 2% 2 %  Patient tolerance: patient tolerated the procedure well with no immediate complications          At the conclusion of the injection I discussed the importance of continued quad strengthening exercises on a daily basis. I will see the patient back if the symptoms should fail to improve or worsen.    Sandy Castaneda, APRN  8/6/2021

## 2021-08-11 ENCOUNTER — TRANSCRIBE ORDERS (OUTPATIENT)
Dept: ADMINISTRATIVE | Facility: HOSPITAL | Age: 81
End: 2021-08-11

## 2021-08-11 DIAGNOSIS — C73 THYROID CANCER (HCC): Primary | ICD-10-CM

## 2021-08-26 ENCOUNTER — HOSPITAL ENCOUNTER (OUTPATIENT)
Dept: ULTRASOUND IMAGING | Facility: HOSPITAL | Age: 81
Discharge: HOME | End: 2021-08-26
Admitting: OTOLARYNGOLOGY

## 2021-08-26 DIAGNOSIS — C73 THYROID CANCER (HCC): ICD-10-CM

## 2021-08-26 PROCEDURE — 76536 US EXAM OF HEAD AND NECK: CPT

## 2021-11-01 ENCOUNTER — TELEPHONE (OUTPATIENT)
Dept: ORTHOPEDIC SURGERY | Facility: CLINIC | Age: 81
End: 2021-11-01

## 2021-11-01 NOTE — TELEPHONE ENCOUNTER
Caller: Tiff Del Rio    Relationship: Self    Best call back number: [phone]    What is the best time to reach you: ANYTIME    Who are you requesting to speak with (clinical staff, provider,  specific staff member): CLINICAL STAFF      What was the call regarding: SHE HAS AN APPT 11/12 FOR AN INJ FOR HER RT KNEE AND WANT TO KNOW IF SHE CAN GET IT IN HER LFT KNEE AS WELL    Do you require a callback: YES

## 2021-11-12 ENCOUNTER — CLINICAL SUPPORT (OUTPATIENT)
Dept: ORTHOPEDIC SURGERY | Facility: CLINIC | Age: 81
End: 2021-11-12

## 2021-11-12 VITALS — TEMPERATURE: 96.9 F | HEIGHT: 63 IN | BODY MASS INDEX: 30.12 KG/M2 | WEIGHT: 170 LBS

## 2021-11-12 DIAGNOSIS — M17.11 PRIMARY OSTEOARTHRITIS OF RIGHT KNEE: Primary | ICD-10-CM

## 2021-11-12 DIAGNOSIS — M17.12 PRIMARY OSTEOARTHRITIS OF LEFT KNEE: ICD-10-CM

## 2021-11-12 PROCEDURE — 20610 DRAIN/INJ JOINT/BURSA W/O US: CPT | Performed by: NURSE PRACTITIONER

## 2021-11-12 RX ORDER — METHYLPREDNISOLONE ACETATE 80 MG/ML
80 INJECTION, SUSPENSION INTRA-ARTICULAR; INTRALESIONAL; INTRAMUSCULAR; SOFT TISSUE
Status: COMPLETED | OUTPATIENT
Start: 2021-11-12 | End: 2021-11-12

## 2021-11-12 RX ORDER — LIDOCAINE HYDROCHLORIDE 20 MG/ML
2 INJECTION, SOLUTION EPIDURAL; INFILTRATION; INTRACAUDAL; PERINEURAL
Status: COMPLETED | OUTPATIENT
Start: 2021-11-12 | End: 2021-11-12

## 2021-11-12 RX ADMIN — LIDOCAINE HYDROCHLORIDE 2 ML: 20 INJECTION, SOLUTION EPIDURAL; INFILTRATION; INTRACAUDAL; PERINEURAL at 13:55

## 2021-11-12 RX ADMIN — METHYLPREDNISOLONE ACETATE 80 MG: 80 INJECTION, SUSPENSION INTRA-ARTICULAR; INTRALESIONAL; INTRAMUSCULAR; SOFT TISSUE at 13:55

## 2022-02-17 ENCOUNTER — CLINICAL SUPPORT (OUTPATIENT)
Dept: ORTHOPEDIC SURGERY | Facility: CLINIC | Age: 82
End: 2022-02-17

## 2022-02-17 VITALS — WEIGHT: 171 LBS | HEIGHT: 64 IN | BODY MASS INDEX: 29.19 KG/M2 | TEMPERATURE: 96.5 F

## 2022-02-17 DIAGNOSIS — M17.11 PRIMARY OSTEOARTHRITIS OF RIGHT KNEE: Primary | ICD-10-CM

## 2022-02-17 DIAGNOSIS — M17.12 PRIMARY OSTEOARTHRITIS OF LEFT KNEE: ICD-10-CM

## 2022-02-17 PROCEDURE — 20610 DRAIN/INJ JOINT/BURSA W/O US: CPT | Performed by: NURSE PRACTITIONER

## 2022-02-17 RX ORDER — LIDOCAINE HYDROCHLORIDE 20 MG/ML
2 INJECTION, SOLUTION EPIDURAL; INFILTRATION; INTRACAUDAL; PERINEURAL
Status: COMPLETED | OUTPATIENT
Start: 2022-02-17 | End: 2022-02-17

## 2022-02-17 RX ORDER — METHYLPREDNISOLONE ACETATE 80 MG/ML
80 INJECTION, SUSPENSION INTRA-ARTICULAR; INTRALESIONAL; INTRAMUSCULAR; SOFT TISSUE
Status: COMPLETED | OUTPATIENT
Start: 2022-02-17 | End: 2022-02-17

## 2022-02-17 RX ADMIN — METHYLPREDNISOLONE ACETATE 80 MG: 80 INJECTION, SUSPENSION INTRA-ARTICULAR; INTRALESIONAL; INTRAMUSCULAR; SOFT TISSUE at 11:26

## 2022-02-17 RX ADMIN — LIDOCAINE HYDROCHLORIDE 2 ML: 20 INJECTION, SOLUTION EPIDURAL; INFILTRATION; INTRACAUDAL; PERINEURAL at 11:26

## 2022-02-17 NOTE — PROGRESS NOTES
2/17/2022    Tiff Del Rio is here today for worsening knee pain. Pt has undergone injection of the knee in the past with good resolution of symptoms. Pt is requesting a repeat injection.     KNEE Injection Procedure Note:    Large Joint Arthrocentesis: R knee  Date/Time: 2/17/2022 11:26 AM  Consent given by: patient  Site marked: site marked  Timeout: Immediately prior to procedure a time out was called to verify the correct patient, procedure, equipment, support staff and site/side marked as required   Supporting Documentation  Indications: pain and joint swelling   Procedure Details  Location: knee - R knee  Preparation: Patient was prepped and draped in the usual sterile fashion  Needle size: 22 G  Approach: anterolateral  Medications administered: 80 mg methylPREDNISolone acetate 80 MG/ML; 2 mL lidocaine PF 2% 2 %  Patient tolerance: patient tolerated the procedure well with no immediate complications    Large Joint Arthrocentesis: L knee  Date/Time: 2/17/2022 11:26 AM  Consent given by: patient  Site marked: site marked  Timeout: Immediately prior to procedure a time out was called to verify the correct patient, procedure, equipment, support staff and site/side marked as required   Supporting Documentation  Indications: pain and joint swelling   Procedure Details  Location: knee - L knee  Preparation: Patient was prepped and draped in the usual sterile fashion  Needle size: 22 G  Approach: anterolateral  Medications administered: 80 mg methylPREDNISolone acetate 80 MG/ML; 2 mL lidocaine PF 2% 2 %  Patient tolerance: patient tolerated the procedure well with no immediate complications          At the conclusion of the injection I discussed the importance of continued quad strengthening exercises on a daily basis. I will see the patient back if the symptoms should fail to improve or worsen.    Sandy Castaneda, APRN  2/17/2022

## 2022-05-20 ENCOUNTER — CLINICAL SUPPORT (OUTPATIENT)
Dept: ORTHOPEDIC SURGERY | Facility: CLINIC | Age: 82
End: 2022-05-20

## 2022-05-20 VITALS — BODY MASS INDEX: 30.39 KG/M2 | HEIGHT: 64 IN | WEIGHT: 178 LBS | TEMPERATURE: 96 F

## 2022-05-20 DIAGNOSIS — M17.12 PRIMARY OSTEOARTHRITIS OF LEFT KNEE: ICD-10-CM

## 2022-05-20 DIAGNOSIS — M17.11 PRIMARY OSTEOARTHRITIS OF RIGHT KNEE: Primary | ICD-10-CM

## 2022-05-20 PROCEDURE — 20610 DRAIN/INJ JOINT/BURSA W/O US: CPT | Performed by: NURSE PRACTITIONER

## 2022-05-20 RX ORDER — METHYLPREDNISOLONE ACETATE 80 MG/ML
80 INJECTION, SUSPENSION INTRA-ARTICULAR; INTRALESIONAL; INTRAMUSCULAR; SOFT TISSUE
Status: COMPLETED | OUTPATIENT
Start: 2022-05-20 | End: 2022-05-20

## 2022-05-20 RX ADMIN — METHYLPREDNISOLONE ACETATE 80 MG: 80 INJECTION, SUSPENSION INTRA-ARTICULAR; INTRALESIONAL; INTRAMUSCULAR; SOFT TISSUE at 13:48

## 2022-05-20 NOTE — PROGRESS NOTES
5/20/2022    Tiff Del Rio is here today for worsening knee pain. Pt has undergone injection of the knee in the past with good resolution of symptoms. Pt is requesting a repeat injection.     KNEE Injection Procedure Note:    Large Joint Arthrocentesis: R knee  Date/Time: 5/20/2022 1:48 PM  Consent given by: patient  Site marked: site marked  Timeout: Immediately prior to procedure a time out was called to verify the correct patient, procedure, equipment, support staff and site/side marked as required   Supporting Documentation  Indications: pain and joint swelling   Procedure Details  Location: knee - R knee  Preparation: Patient was prepped and draped in the usual sterile fashion  Needle size: 22 G  Approach: anterolateral  Medications administered: 80 mg methylPREDNISolone acetate 80 MG/ML; 2 mL lidocaine (cardiac)  Patient tolerance: patient tolerated the procedure well with no immediate complications    Large Joint Arthrocentesis: L knee  Date/Time: 5/20/2022 1:48 PM  Consent given by: patient  Site marked: site marked  Timeout: Immediately prior to procedure a time out was called to verify the correct patient, procedure, equipment, support staff and site/side marked as required   Supporting Documentation  Indications: pain and joint swelling   Procedure Details  Location: knee - L knee  Preparation: Patient was prepped and draped in the usual sterile fashion  Needle size: 22 G  Approach: anterolateral  Medications administered: 80 mg methylPREDNISolone acetate 80 MG/ML; 2 mL lidocaine (cardiac)  Patient tolerance: patient tolerated the procedure well with no immediate complications          At the conclusion of the injection I discussed the importance of continued quad strengthening exercises on a daily basis. I will see the patient back if the symptoms should fail to improve or worsen.    Sandy Castaneda, APRN  5/20/2022

## 2022-08-08 ENCOUNTER — TRANSCRIBE ORDERS (OUTPATIENT)
Dept: ADMINISTRATIVE | Facility: HOSPITAL | Age: 82
End: 2022-08-08

## 2022-08-08 DIAGNOSIS — R22.1 NECK MASS: Primary | ICD-10-CM

## 2022-08-10 ENCOUNTER — HOSPITAL ENCOUNTER (OUTPATIENT)
Dept: ULTRASOUND IMAGING | Facility: HOSPITAL | Age: 82
Discharge: HOME | End: 2022-08-10
Admitting: OTOLARYNGOLOGY

## 2022-08-10 DIAGNOSIS — R22.1 NECK MASS: ICD-10-CM

## 2022-08-10 PROCEDURE — 76536 US EXAM OF HEAD AND NECK: CPT

## 2022-08-25 ENCOUNTER — CLINICAL SUPPORT (OUTPATIENT)
Dept: ORTHOPEDIC SURGERY | Facility: CLINIC | Age: 82
End: 2022-08-25

## 2022-08-25 VITALS — HEIGHT: 64 IN | TEMPERATURE: 98 F | BODY MASS INDEX: 28.15 KG/M2 | WEIGHT: 164.9 LBS

## 2022-08-25 DIAGNOSIS — M25.561 PAIN IN BOTH KNEES, UNSPECIFIED CHRONICITY: Primary | ICD-10-CM

## 2022-08-25 DIAGNOSIS — M25.562 PAIN IN BOTH KNEES, UNSPECIFIED CHRONICITY: Primary | ICD-10-CM

## 2022-08-25 PROCEDURE — 73562 X-RAY EXAM OF KNEE 3: CPT | Performed by: NURSE PRACTITIONER

## 2022-08-25 PROCEDURE — 20610 DRAIN/INJ JOINT/BURSA W/O US: CPT | Performed by: NURSE PRACTITIONER

## 2022-08-25 RX ORDER — METHYLPREDNISOLONE ACETATE 80 MG/ML
80 INJECTION, SUSPENSION INTRA-ARTICULAR; INTRALESIONAL; INTRAMUSCULAR; SOFT TISSUE
Status: COMPLETED | OUTPATIENT
Start: 2022-08-25 | End: 2022-08-25

## 2022-08-25 RX ADMIN — METHYLPREDNISOLONE ACETATE 80 MG: 80 INJECTION, SUSPENSION INTRA-ARTICULAR; INTRALESIONAL; INTRAMUSCULAR; SOFT TISSUE at 10:29

## 2022-08-25 RX ADMIN — METHYLPREDNISOLONE ACETATE 80 MG: 80 INJECTION, SUSPENSION INTRA-ARTICULAR; INTRALESIONAL; INTRAMUSCULAR; SOFT TISSUE at 10:28

## 2022-08-25 NOTE — PROGRESS NOTES
8/25/2022    Tiff Del Rio is here today for worsening knee pain. Pt has undergone injection of the knee in the past with good resolution of symptoms. Pt is requesting a repeat injection.     KNEE Injection Procedure Note:    Large Joint Arthrocentesis: R knee  Date/Time: 8/25/2022 10:28 AM  Consent given by: patient  Site marked: site marked  Timeout: Immediately prior to procedure a time out was called to verify the correct patient, procedure, equipment, support staff and site/side marked as required   Supporting Documentation  Indications: pain and joint swelling   Procedure Details  Location: knee - R knee  Preparation: Patient was prepped and draped in the usual sterile fashion  Needle size: 22 G  Approach: anterolateral  Medications administered: 80 mg methylPREDNISolone acetate 80 MG/ML; 2 mL lidocaine (cardiac)  Patient tolerance: patient tolerated the procedure well with no immediate complications    Large Joint Arthrocentesis: L knee  Date/Time: 8/25/2022 10:29 AM  Consent given by: patient  Site marked: site marked  Timeout: Immediately prior to procedure a time out was called to verify the correct patient, procedure, equipment, support staff and site/side marked as required   Supporting Documentation  Indications: pain and joint swelling   Procedure Details  Location: knee - L knee  Preparation: Patient was prepped and draped in the usual sterile fashion  Needle size: 22 G  Approach: anterolateral  Medications administered: 80 mg methylPREDNISolone acetate 80 MG/ML; 2 mL lidocaine (cardiac)  Patient tolerance: patient tolerated the procedure well with no immediate complications      3 views of bilateral knees were ordered and reviewed today secondary to pain and show significant arthritic changes.  Compared to views are unchanged    At the conclusion of the injection I discussed the importance of continued quad strengthening exercises on a daily basis. I will see the patient back if the symptoms  should fail to improve or worsen.    Sandy Castaneda, APRN  8/25/2022

## 2022-11-29 ENCOUNTER — CLINICAL SUPPORT (OUTPATIENT)
Dept: ORTHOPEDIC SURGERY | Facility: CLINIC | Age: 82
End: 2022-11-29

## 2022-11-29 VITALS — TEMPERATURE: 97.6 F | WEIGHT: 164 LBS | HEIGHT: 64 IN | BODY MASS INDEX: 28 KG/M2

## 2022-11-29 DIAGNOSIS — M25.562 PAIN IN BOTH KNEES, UNSPECIFIED CHRONICITY: Primary | ICD-10-CM

## 2022-11-29 DIAGNOSIS — M25.561 PAIN IN BOTH KNEES, UNSPECIFIED CHRONICITY: Primary | ICD-10-CM

## 2022-11-29 PROCEDURE — 20610 DRAIN/INJ JOINT/BURSA W/O US: CPT | Performed by: NURSE PRACTITIONER

## 2022-11-29 RX ORDER — METHYLPREDNISOLONE ACETATE 80 MG/ML
80 INJECTION, SUSPENSION INTRA-ARTICULAR; INTRALESIONAL; INTRAMUSCULAR; SOFT TISSUE
Status: COMPLETED | OUTPATIENT
Start: 2022-11-29 | End: 2022-11-29

## 2022-11-29 RX ADMIN — METHYLPREDNISOLONE ACETATE 80 MG: 80 INJECTION, SUSPENSION INTRA-ARTICULAR; INTRALESIONAL; INTRAMUSCULAR; SOFT TISSUE at 15:25

## 2022-11-29 NOTE — PROGRESS NOTES
11/29/2022    Tiff Del Rio is here today for worsening knee pain. Pt has undergone injection of the knee in the past with good resolution of symptoms. Pt is requesting a repeat injection.     KNEE Injection Procedure Note:    Large Joint Arthrocentesis: R knee  Date/Time: 11/29/2022 3:25 PM  Consent given by: patient  Site marked: site marked  Timeout: Immediately prior to procedure a time out was called to verify the correct patient, procedure, equipment, support staff and site/side marked as required   Supporting Documentation  Indications: pain   Procedure Details  Location: knee - R knee  Preparation: Patient was prepped and draped in the usual sterile fashion  Needle gauge: 21.  Approach: anterolateral  Medications administered: 80 mg methylPREDNISolone acetate 80 MG/ML; 2 mL lidocaine (cardiac)  Patient tolerance: patient tolerated the procedure well with no immediate complications    Large Joint Arthrocentesis: L knee  Date/Time: 11/29/2022 3:25 PM  Consent given by: patient  Site marked: site marked  Timeout: Immediately prior to procedure a time out was called to verify the correct patient, procedure, equipment, support staff and site/side marked as required   Supporting Documentation  Indications: pain   Procedure Details  Location: knee - L knee  Preparation: Patient was prepped and draped in the usual sterile fashion  Needle gauge: 21.  Approach: anterolateral  Medications administered: 80 mg methylPREDNISolone acetate 80 MG/ML; 2 mL lidocaine (cardiac)  Patient tolerance: patient tolerated the procedure well with no immediate complications          At the conclusion of the injection I discussed the importance of continued quad strengthening exercises on a daily basis. I will see the patient back if the symptoms should fail to improve or worsen.    Sandy Castaneda, APRN

## 2022-12-27 ENCOUNTER — TELEPHONE (OUTPATIENT)
Dept: ORTHOPEDIC SURGERY | Facility: CLINIC | Age: 82
End: 2022-12-27

## 2022-12-27 NOTE — TELEPHONE ENCOUNTER
Provider:  MONIKA BARAHONA  Caller:  JEN DIALLO  Relationship to Patient:  SELF  Pharmacy:  [facility] [phone]  Phone Number:  [phone] (CAN LEAVE A MESSAGE)  Reason for Call:  PATIENT GOT BILATERAL CORTISONE KNEE INJECTIONS ON 11/29/22. THIS TIME THE RIGHT KNEE DID NOT GET BETTER. PLEASE ADVISE.

## 2023-01-10 ENCOUNTER — OFFICE VISIT (OUTPATIENT)
Dept: ORTHOPEDIC SURGERY | Facility: CLINIC | Age: 83
End: 2023-01-10
Payer: MEDICARE

## 2023-01-10 VITALS — BODY MASS INDEX: 26.29 KG/M2 | WEIGHT: 154 LBS | TEMPERATURE: 98.6 F | HEIGHT: 64 IN

## 2023-01-10 DIAGNOSIS — M17.11 PRIMARY OSTEOARTHRITIS OF RIGHT KNEE: ICD-10-CM

## 2023-01-10 DIAGNOSIS — R52 PAIN: Primary | ICD-10-CM

## 2023-01-10 PROCEDURE — 73562 X-RAY EXAM OF KNEE 3: CPT | Performed by: NURSE PRACTITIONER

## 2023-01-10 PROCEDURE — 99214 OFFICE O/P EST MOD 30 MIN: CPT | Performed by: NURSE PRACTITIONER

## 2023-01-10 NOTE — PROGRESS NOTES
Patient: Tiff Del Rio  YOB: 1940 82 y.o. female  Medical Record Number: [id number]    Chief Complaints:   Chief Complaint   Patient presents with   • Right Knee - Initial Evaluation       History of Present Illness:Tiff Del Rio is a 82 y.o. female who presents with complaints of increased right knee pain.  Patient has known history of significant osteoarthritis, at this point she does not want to proceed with surgery.  She had cortisone injections a few weeks ago they helped quite a bit but then she was going down some stairs started with increased right knee pain and unfortunately 3 weeks ago with the weather and increased activity the right knee pain worsened even more.  She describes it as severe at times worse with increased activity, only slightly better with rest    Allergies:   Allergies   Allergen Reactions   • Codeine Unknown (See Comments)     Other reaction(s): GI Intolerance   • Codeine Phosphate GI Intolerance       Medications:   Current Outpatient Medications   Medication Sig Dispense Refill   • amLODIPine (NORVASC) 5 MG tablet Take 1 tablet by mouth Daily. 30 tablet 1   • aspirin 81 MG chewable tablet Chew 1 tablet Daily.     • atorvastatin (LIPITOR) 80 MG tablet Take 1 tablet by mouth Every Night. 30 tablet 0   • exemestane (AROMASIN) 25 MG chemo tablet Take 1 tablet by mouth Daily. 90 tablet 0   • furosemide (LASIX) 20 MG tablet Take 1 tablet by mouth Daily. 90 tablet 1   • levothyroxine (SYNTHROID) 88 MCG tablet Take 1 tablet by mouth Daily. 30 tablet 0   • metFORMIN (GLUCOPHAGE) 500 MG tablet Take 1 tablet by mouth Daily With Breakfast. 90 tablet 0   • metoprolol succinate XL (TOPROL-XL) 200 MG 24 hr tablet Take 1 tablet by mouth Daily. 90 tablet 0   • olmesartan-hydrochlorothiazide (BENICAR HCT) 40-12.5 MG per tablet Take 1 tablet by mouth Daily. 90 tablet 0   • omeprazole (priLOSEC) 40 MG capsule      • vitamin D (ERGOCALCIFEROL) 1.25 MG (97089 UT) capsule capsule Take  50,000 Units by mouth.       Current Facility-Administered Medications   Medication Dose Route Frequency Provider Last Rate Last Admin   • cyanocobalamin injection 1,000 mcg  1,000 mcg Intramuscular Q28 Days Betsey Harley APRN   1,000 mcg at 05/28/19 1609   • cyanocobalamin injection 1,000 mcg  1,000 mcg Intramuscular Q28 Days Betsey Harley APRN   1,000 mcg at 06/03/19 1403         The following portions of the patient's history were reviewed and updated as appropriate: allergies, current medications, past family history, past medical history, past social history, past surgical history and problem list.    Review of Systems:   A 14 point review of systems was performed. All systems negative except pertinent positives/negative listed in HPI above    Physical Exam:   Vitals:    01/10/23 1015   Temp: 98.6 °F (37 °C)   TempSrc: Temporal   Weight: 69.9 kg (154 lb)   Height: 162.6 cm (64.02\")       General: A and O x 3, ASA, NAD    SCLERA:    Normal    Skin clear no unusual lesions noted  Right knee patient has trace amount of effusion noted with 120 degrees flexion neutral in extension with a positive [place] negative Lockman calf soft and nontender       Radiology:  Xrays 3views (ap,lateral, sunrise) right knee were ordered and reviewed today secondary to pain and show bone-on-bone end-stage osteoarthritis with cyst and spur formation.  Compared to views show slight progression in arthritic changes    Assessment/Plan: Osteoarthritis right knee with increasing pain    Patient and I discussed options, she does not want to proceed with surgery at this time.  Too soon for another injection.  Instead we will try Pennsaid gel twice daily and physical therapy.  She will keep her follow-up appointment in February as scheduled and will let me know if her symptoms do not improve      Sandy Castaneda, BROOKLYN  1/10/2023

## 2023-01-27 ENCOUNTER — TREATMENT (OUTPATIENT)
Dept: PHYSICAL THERAPY | Facility: CLINIC | Age: 83
End: 2023-01-27
Payer: MEDICARE

## 2023-01-27 DIAGNOSIS — R26.89 IMPAIRED GAIT AND MOBILITY: ICD-10-CM

## 2023-01-27 DIAGNOSIS — M17.11 PRIMARY OSTEOARTHRITIS OF RIGHT KNEE: Primary | ICD-10-CM

## 2023-01-27 DIAGNOSIS — G89.29 CHRONIC PAIN OF RIGHT KNEE: ICD-10-CM

## 2023-01-27 DIAGNOSIS — M25.561 CHRONIC PAIN OF RIGHT KNEE: ICD-10-CM

## 2023-01-27 DIAGNOSIS — M25.661 DECREASED ROM OF RIGHT KNEE: ICD-10-CM

## 2023-01-27 PROCEDURE — 97140 MANUAL THERAPY 1/> REGIONS: CPT | Performed by: PHYSICAL THERAPIST

## 2023-01-27 PROCEDURE — 97161 PT EVAL LOW COMPLEX 20 MIN: CPT | Performed by: PHYSICAL THERAPIST

## 2023-01-27 PROCEDURE — 97110 THERAPEUTIC EXERCISES: CPT | Performed by: PHYSICAL THERAPIST

## 2023-01-27 NOTE — PATIENT INSTRUCTIONS
Access Code: [email or web address]  URL: [email or web address]  Date: 01/27/2023  Prepared by: Rylee Agarwal    Exercises  Supine Quad Set - 1 x daily - 7 x weekly - 1 sets - 10 reps - 3-5 sec. hold  Supine Knee Extension Strengthening - 1 x daily - 7 x weekly - 1 sets - 10 reps - 3-5 sec. hold  Supine Heel Slide with Strap - 1 x daily - 7 x weekly - 1 sets - 10 reps - 3-5 sec. hold  Retro Step - 1 x daily - 7 x weekly - 1 sets - 10 reps

## 2023-01-27 NOTE — PROGRESS NOTES
Physical Therapy Initial Evaluation and Plan of Care  [facility] [place]  [address]  [place] [address]  P: [phone]       F: [phone]      Patient: Tiff Del Rio   : 1940  Visit Diagnoses:     ICD-10-CM ICD-9-CM   1. Primary osteoarthritis of right knee  M17.11 715.16   2. Impaired gait and mobility  R26.89 781.2   3. Decreased ROM of right knee  M25.661 719.56   4. Chronic pain of right knee  M25.561 719.46    G89.29 338.29     Referring practitioner: BROOKLYN Nam  Date of Initial Visit: 2023  Today's Date: 2023  Patient seen for 1 sessions           Subjective Questionnaire: WOMAC       Subjective Evaluation    History of Present Illness  Date of onset: 1/10/2023  Mechanism of injury: Patient reports arthritis in knee, hurts more with cold weather.  Has been getting shots in both knee for about the past 3 yrs.  Most recent shot did not help her right knee very much.  Occasionally walks with a cane.    Have not been exercises due to the pain exacerbation.    PMH: arthritis, Left frozen shoulder, breast cancer-right mastectomy, right UE lymphedema, hysterectomy, Thyroid cancer-thyroidectomy, DM, osteoporosis, Stroke, TIA      Subjective comment: Patient reports right knee pain.  Patient Occupation: Retired Pain  Current pain ratin  At best pain ratin  At worst pain ratin  Location: Right knee-primarily on the front of the knee  Quality: dull ache and sharp  Relieving factors: ice and heat  Aggravating factors: standing, ambulation and stairs  Progression: improved    Social Support  Lives in: multiple-level home  Lives with: spouse    Hand dominance: right    Treatments  Previous treatment: injection treatment  Current treatment: injection treatment  Patient Goals  Patient goals for therapy: decreased pain  Patient goal: To learn exercises she can do at home.           Objective          Palpation     Right  Tenderness of the distal biceps femoris, distal semimembranosus and distal semitendinosus.     Additional Palpation Details  Right proximal calf muscles    Tenderness     Right Knee   Tenderness in the ITB, medial joint line and pes anserinus.     Active Range of Motion   Left Knee   Flexion: 130 degrees   Extension: 0 degrees     Right Knee   Flexion: 120 degrees with pain  Extensor lag: 15 degrees with pain    Strength/Myotome Testing     Left Knee   Flexion: 5  Extension: 5    Right Knee   Flexion: 4  Extension: 4  Quadriceps contraction: fair    Tests     Additional Tests Details  Deferred due to advanced joint changes per imaging.    Ambulation     Ambulation: Stairs   Ascend stairs: independent  Pattern: reciprocal  Railings: one rail  Descend stairs: independent  Pattern: non-reciprocal  Railings: one rail    Functional Assessment   Squat   Pain, left tibial anterior translation beyond toes, right valgus and right tibial anterior translation beyond toes.           Assessment & Plan     Assessment  Impairments: abnormal gait, abnormal or restricted ROM, activity intolerance, impaired physical strength, pain with function and weight-bearing intolerance  Functional Limitations: walking, uncomfortable because of pain and standing  Assessment details: Tiff Del Rio is a pleasant 82 y.o. female that presents with signs and symptoms consistent with the above diagnosis. She has decreased right knee ROM, strength, pain limited functional mobility and activity tolerance.  Pt will benefit from skilled PT services in order to address listed impairments, decrease pain and restore function.    Prognosis: good  Prognosis details: Patient demonstrates good rehab potential as evidenced by high motivation to participate with PT POC and to return to PLOF/ADLs/IADLs.    Goals  Plan Goals: Short Term Goals (2 wks):  1.  Patient will have increased right knee flexion to 130 degrees.  2.  Patient will have increased right knee  extension to 0 degrees.  3.  Patient will have increased right quad contraction to WNL.  4.  Patient will demonstrate normalized gait pattern.      Long Term Goals (4 wks):  1.  Patient will be independent in performance of HEP for carryover upon discharge from skilled PT services.  2.  Patient will have right knee strength of 5/5.  3.  Patient will have improved WOMAC score of 18/96 or better.  4.  Patient will be able to perform functional squat patterns with good form.      Plan  Therapy options: will be seen for skilled therapy services  Planned modality interventions: thermotherapy (hydrocollator packs) and cryotherapy  Planned therapy interventions: manual therapy, soft tissue mobilization, strengthening, stretching, balance/weight-bearing training, flexibility, functional ROM exercises, gait training, joint mobilization, home exercise program, neuromuscular re-education and therapeutic activities  Frequency: 2x week  Duration in weeks: 4  Treatment plan discussed with: patient  Plan details: Pt was educated on the importance of their HEP and their current need for continued skilled physical therapy. Patients goals and potential limitations were discussed and pt is in agreement with current plan of care and treatment emphasis.                History # of Personal Factors and/or Comorbidities: HIGH (3+)  Examination of Body System(s): # of elements: LOW (1-2)  Clinical Presentation: STABLE   Clinical Decision Making: LOW       Timed:         Manual Therapy:    10     mins  28932;     Therapeutic Exercise:    15     mins  90493;     Neuromuscular Declan:        mins  26376;    Therapeutic Activity:     10     mins  65146;     Gait Training:           mins  83403;     Ultrasound:          mins  09453;    Ionto                                  mins  50948  Self Care                            mins  44125  Canalith Repos         mins  18738  Orthotic MGMT/Train         mins  52929    Un-Timed:  Electrical  Stimulation:         mins  33808 ( );  Dry Needling:          mins  59276 self-pay;  Dry Needling:          mins  11670 self-pay  Traction          mins  97163  Low Eval     20     mins  35476  Mod Eval          mins  54007  High Eval                            mins  50992    Timed Treatment:   35   mins   Total Treatment:     65   mins      PT SIGNATURE: Rylee Agarwal PT     License Number: [id number]  Electronically signed by Rylee Agarwal PT, 01/27/23, 3:29 PM EST      DATE TREATMENT INITIATED: 1/27/2023    Initial Certification  Certification Period: 1/27/2023 thru 4/26/2023  I certify that the therapy services are furnished while this patient is under my care.  The services outlined above are required by this patient, and will be reviewed every 90 days.     PHYSICIAN: Sandy Castaneda APRN      NPI: [id number]  DATE:         Please sign and return via fax to [phone]. Thank you, [facility].

## 2023-01-30 ENCOUNTER — TREATMENT (OUTPATIENT)
Dept: PHYSICAL THERAPY | Facility: CLINIC | Age: 83
End: 2023-01-30
Payer: MEDICARE

## 2023-01-30 DIAGNOSIS — M17.11 PRIMARY OSTEOARTHRITIS OF RIGHT KNEE: Primary | ICD-10-CM

## 2023-01-30 DIAGNOSIS — M25.661 DECREASED ROM OF RIGHT KNEE: ICD-10-CM

## 2023-01-30 DIAGNOSIS — G89.29 CHRONIC PAIN OF RIGHT KNEE: ICD-10-CM

## 2023-01-30 DIAGNOSIS — M25.561 CHRONIC PAIN OF RIGHT KNEE: ICD-10-CM

## 2023-01-30 DIAGNOSIS — R26.89 IMPAIRED GAIT AND MOBILITY: ICD-10-CM

## 2023-01-30 PROCEDURE — 97140 MANUAL THERAPY 1/> REGIONS: CPT | Performed by: PHYSICAL THERAPIST

## 2023-01-30 PROCEDURE — 97110 THERAPEUTIC EXERCISES: CPT | Performed by: PHYSICAL THERAPIST

## 2023-01-30 PROCEDURE — 97530 THERAPEUTIC ACTIVITIES: CPT | Performed by: PHYSICAL THERAPIST

## 2023-01-30 NOTE — PROGRESS NOTES
Physical Therapy Treatment Note  [facility] [place]   [address], [address]  [place] [address]  P: [phone]       F: [phone]      Patient: Tiff Del Rio   : 1940  Treatment Diagnosis:     ICD-10-CM ICD-9-CM   1. Primary osteoarthritis of right knee  M17.11 715.16   2. Impaired gait and mobility  R26.89 781.2   3. Decreased ROM of right knee  M25.661 719.56   4. Chronic pain of right knee  M25.561 719.46    G89.29 338.29     Referring practitioner: BROOKLYN Nam  Date of Initial Visit: Type: THERAPY  Noted: 2023  Today's Date: 2023  Patient seen for 2 sessions           Subjective   Patient reports she has had less pain in her knee.  States she did twist her knee getting in the car yesterday.    Objective     See Exercise, Manual, and Modality Logs for complete treatment.       Assessment/Plan  Patient tolerated manual therapy well with improved knee extension.  Progressed strengthening exercises as well as HEP.  Benefits from cueing for exercise technique and to prevent compensatory patterns.  Provided written instructions for home use.  Progress per Plan of Care and Progress strengthening /stabilization /functional activity           Timed:         Manual Therapy:    10     mins  48974;     Therapeutic Exercise:    20     mins  22169;     Neuromuscular Declan:        mins  73467;    Therapeutic Activity:     10     mins  35653;     Gait Training:           mins  03367;     Ultrasound:          mins  80668;    Ionto                                  mins  81405  Self Care                            mins  48981  Canalith Repos         mins  38331  Orthotic MGMT/Train         mins  98622    Un-Timed:  Electrical Stimulation:         mins  54079 ( );  Dry Needling:          mins  74752 self-pay;  Dry Needling:          mins  21978 self-pay  Traction          mins  50206      Timed Treatment:   40   mins   Total Treatment:     50    mins        PT SIGNATURE: Rylee Agarwal PT     License Number: [id number]  Electronically signed by Rylee Agarwal PT, 01/30/23, 7:19 AM EST

## 2023-02-03 ENCOUNTER — TREATMENT (OUTPATIENT)
Dept: PHYSICAL THERAPY | Facility: CLINIC | Age: 83
End: 2023-02-03
Payer: MEDICARE

## 2023-02-03 DIAGNOSIS — M25.561 CHRONIC PAIN OF RIGHT KNEE: ICD-10-CM

## 2023-02-03 DIAGNOSIS — M25.661 DECREASED ROM OF RIGHT KNEE: ICD-10-CM

## 2023-02-03 DIAGNOSIS — G89.29 CHRONIC PAIN OF RIGHT KNEE: ICD-10-CM

## 2023-02-03 DIAGNOSIS — M17.11 PRIMARY OSTEOARTHRITIS OF RIGHT KNEE: Primary | ICD-10-CM

## 2023-02-03 DIAGNOSIS — R26.89 IMPAIRED GAIT AND MOBILITY: ICD-10-CM

## 2023-02-03 PROCEDURE — 97140 MANUAL THERAPY 1/> REGIONS: CPT | Performed by: PHYSICAL THERAPIST

## 2023-02-03 PROCEDURE — 97110 THERAPEUTIC EXERCISES: CPT | Performed by: PHYSICAL THERAPIST

## 2023-02-03 NOTE — PROGRESS NOTES
Physical Therapy Treatment Note  [facility] [place]   [address], [address]  [place] [address]  P: [phone]       F: [phone]      Patient: Tiff Del Rio   : 1940  Treatment Diagnosis:     ICD-10-CM ICD-9-CM   1. Primary osteoarthritis of right knee  M17.11 715.16   2. Impaired gait and mobility  R26.89 781.2   3. Decreased ROM of right knee  M25.661 719.56   4. Chronic pain of right knee  M25.561 719.46    G89.29 338.29     Referring practitioner: BROOKLYN Nam  Date of Initial Visit: Type: THERAPY  Noted: 2023  Today's Date: 2/3/2023  Patient seen for 3 sessions           Subjective   Patient reports her knee is feeling better, having less intense discomfort when walking.  Reports she has been trying to concentrate on straightening her knee when walking.    Objective     See Exercise, Manual, and Modality Logs for complete treatment.       Assessment/Plan  Patient performed program to tolerance with progressed strengthening.  She tolerated progression well without adverse symptoms.  She responded positively to manual therapy with improved knee mobility.  She continues to have limited right knee extension.  Progress per Plan of Care and Progress strengthening /stabilization /functional activity           Timed:         Manual Therapy:    10     mins  60679;     Therapeutic Exercise:    18     mins  33854;     Neuromuscular Declan:        mins  18274;    Therapeutic Activity:          mins  27957;     Gait Training:           mins  78489;     Ultrasound:          mins  78651;    Ionto                                  mins  52413  Self Care                            mins  45132  Canalith Repos         mins  69826  Orthotic MGMT/Train         mins  41019    Un-Timed:  Electrical Stimulation:         mins  24152 ( );  Dry Needling:          mins  79890 self-pay;  Dry Needling:          mins   self-pay  Traction          mins   83084      Timed Treatment:   28   mins   Total Treatment:     38   mins        PT SIGNATURE: Rylee Agarwal PT     License Number: [id number]  Electronically signed by Rylee Agarwal PT, 02/03/23, 7:36 AM EST

## 2023-02-06 ENCOUNTER — TREATMENT (OUTPATIENT)
Dept: PHYSICAL THERAPY | Facility: CLINIC | Age: 83
End: 2023-02-06
Payer: MEDICARE

## 2023-02-06 DIAGNOSIS — M17.11 PRIMARY OSTEOARTHRITIS OF RIGHT KNEE: Primary | ICD-10-CM

## 2023-02-06 DIAGNOSIS — R26.89 IMPAIRED GAIT AND MOBILITY: ICD-10-CM

## 2023-02-06 DIAGNOSIS — G89.29 CHRONIC PAIN OF RIGHT KNEE: ICD-10-CM

## 2023-02-06 DIAGNOSIS — M25.561 CHRONIC PAIN OF RIGHT KNEE: ICD-10-CM

## 2023-02-06 DIAGNOSIS — M25.661 DECREASED ROM OF RIGHT KNEE: ICD-10-CM

## 2023-02-06 PROCEDURE — 97110 THERAPEUTIC EXERCISES: CPT | Performed by: PHYSICAL THERAPIST

## 2023-02-06 PROCEDURE — 97140 MANUAL THERAPY 1/> REGIONS: CPT | Performed by: PHYSICAL THERAPIST

## 2023-02-06 NOTE — PROGRESS NOTES
Physical Therapy Treatment Note  [facility] [place]   [address], [address]  [place] [address]  P: [phone]       F: [phone]      Patient: Tiff Del Rio   : 1940  Treatment Diagnosis:     ICD-10-CM ICD-9-CM   1. Primary osteoarthritis of right knee  M17.11 715.16   2. Impaired gait and mobility  R26.89 781.2   3. Decreased ROM of right knee  M25.661 719.56   4. Chronic pain of right knee  M25.561 719.46    G89.29 338.29     Referring practitioner: BROOKLYN Nam  Date of Initial Visit: Type: THERAPY  Noted: 2023  Today's Date: 2023  Patient seen for 4 sessions           Subjective   Patient reports her knee has been feeling okay.    Objective     See Exercise, Manual, and Modality Logs for complete treatment.       Assessment/Plan  Patient was able to perform exercises with progressed intensity.  She responded positively to manual therapy with improved right knee extension.  She initially had some discomfort in her knee with the leg press but as she advanced through repetitions, her discomfort eased.  Progress per Plan of Care and Progress strengthening /stabilization /functional activity           Timed:         Manual Therapy:    12     mins  63583;     Therapeutic Exercise:    20     mins  31641;     Neuromuscular Declan:        mins  60712;    Therapeutic Activity:          mins  59443;     Gait Training:           mins  79324;     Ultrasound:          mins  70650;    Ionto                                  mins  54703  Self Care                            mins  10601  Canalith Repos         mins  70724  Orthotic MGMT/Train         mins  22807    Un-Timed:  Electrical Stimulation:         mins  08370 ( );  Dry Needling:          mins  49094 self-pay;  Dry Needling:          mins  39553 self-pay  Traction          mins  41923      Timed Treatment:   32   mins   Total Treatment:     45   mins        PT SIGNATURE: Rylee Agarwal PT      License Number: [id number]  Electronically signed by Rylee Agarwal PT, 02/06/23, 3:29 PM EST

## 2023-02-10 ENCOUNTER — TREATMENT (OUTPATIENT)
Dept: PHYSICAL THERAPY | Facility: CLINIC | Age: 83
End: 2023-02-10
Payer: MEDICARE

## 2023-02-10 DIAGNOSIS — M17.11 PRIMARY OSTEOARTHRITIS OF RIGHT KNEE: Primary | ICD-10-CM

## 2023-02-10 DIAGNOSIS — M25.661 DECREASED ROM OF RIGHT KNEE: ICD-10-CM

## 2023-02-10 DIAGNOSIS — G89.29 CHRONIC PAIN OF RIGHT KNEE: ICD-10-CM

## 2023-02-10 DIAGNOSIS — R26.89 IMPAIRED GAIT AND MOBILITY: ICD-10-CM

## 2023-02-10 DIAGNOSIS — M25.561 CHRONIC PAIN OF RIGHT KNEE: ICD-10-CM

## 2023-02-10 PROCEDURE — 97140 MANUAL THERAPY 1/> REGIONS: CPT | Performed by: PHYSICAL THERAPIST

## 2023-02-10 PROCEDURE — 97110 THERAPEUTIC EXERCISES: CPT | Performed by: PHYSICAL THERAPIST

## 2023-02-10 NOTE — PROGRESS NOTES
Physical Therapy Treatment Note  [facility] [place]   [address], [address]  [place] [address]  P: [phone]       F: [phone]      Patient: Tiff Del Rio   : 1940  Treatment Diagnosis:     ICD-10-CM ICD-9-CM   1. Primary osteoarthritis of right knee  M17.11 715.16   2. Impaired gait and mobility  R26.89 781.2   3. Decreased ROM of right knee  M25.661 719.56   4. Chronic pain of right knee  M25.561 719.46    G89.29 338.29     Referring practitioner: BROOKLYN Nam  Date of Initial Visit: Type: THERAPY  Noted: 2023  Today's Date: 2/10/2023  Patient seen for 5 sessions           Subjective   Patient reports her knee is feeling better.  States she has been working on getting her knee to straighten more.    Objective     See Exercise, Manual, and Modality Logs for complete treatment.       Assessment/Plan  Patient responded positively to manual therapy with improved right knee ROM and decreased pain.  She performed exercises with no adverse symptoms.  Will continue to progress as tolerated.  Progress per Plan of Care and Progress strengthening /stabilization /functional activity           Timed:         Manual Therapy:    12     mins  67925;     Therapeutic Exercise:    20     mins  15055;     Neuromuscular Declan:        mins  39723;    Therapeutic Activity:          mins  21499;     Gait Training:           mins  90721;     Ultrasound:          mins  83710;    Ionto                                  mins  14382  Self Care                            mins  56323  Canalith Repos         mins  74902  Orthotic MGMT/Train         mins  97683    Un-Timed:  Electrical Stimulation:         mins  27393 ( );  Dry Needling:          mins  29660 self-pay;  Dry Needling:          mins   self-pay  Traction          mins  24011      Timed Treatment:   32   mins   Total Treatment:     42   mins        PT SIGNATURE: Rylee Agarwal PT     License Number:  [id number]  Electronically signed by Rylee Agarwal PT, 02/10/23, 2:42 PM EST

## 2023-02-14 ENCOUNTER — TREATMENT (OUTPATIENT)
Dept: PHYSICAL THERAPY | Facility: CLINIC | Age: 83
End: 2023-02-14
Payer: MEDICARE

## 2023-02-14 DIAGNOSIS — M17.11 PRIMARY OSTEOARTHRITIS OF RIGHT KNEE: Primary | ICD-10-CM

## 2023-02-14 DIAGNOSIS — M25.661 DECREASED ROM OF RIGHT KNEE: ICD-10-CM

## 2023-02-14 DIAGNOSIS — R26.89 IMPAIRED GAIT AND MOBILITY: ICD-10-CM

## 2023-02-14 DIAGNOSIS — M25.561 CHRONIC PAIN OF RIGHT KNEE: ICD-10-CM

## 2023-02-14 DIAGNOSIS — G89.29 CHRONIC PAIN OF RIGHT KNEE: ICD-10-CM

## 2023-02-14 PROCEDURE — 97140 MANUAL THERAPY 1/> REGIONS: CPT | Performed by: PHYSICAL THERAPIST

## 2023-02-14 PROCEDURE — 97110 THERAPEUTIC EXERCISES: CPT | Performed by: PHYSICAL THERAPIST

## 2023-02-14 PROCEDURE — 97530 THERAPEUTIC ACTIVITIES: CPT | Performed by: PHYSICAL THERAPIST

## 2023-02-14 NOTE — PROGRESS NOTES
Physical Therapy Treatment Note  [facility] [place]   [address], [address]  [place] [address]  P: [phone]       F: [phone]      Patient: Tiff Del Rio   : 1940  Treatment Diagnosis:     ICD-10-CM ICD-9-CM   1. Primary osteoarthritis of right knee  M17.11 715.16   2. Impaired gait and mobility  R26.89 781.2   3. Decreased ROM of right knee  M25.661 719.56   4. Chronic pain of right knee  M25.561 719.46    G89.29 338.29     Referring practitioner: BROOKLYN Nam  Date of Initial Visit: Type: THERAPY  Noted: 2023  Today's Date: 2023  Patient seen for 6 sessions           Subjective   Patient reports her knee has been feeling much better.  The pain in less intense and less frequent.  States she has been doing stretching frequently throughout the day.    Objective     See Exercise, Manual, and Modality Logs for complete treatment.       Assessment/Plan  Patient performed progressed exercises with good tolerance and no pain provocation.  Right knee extension ROM is progressing and is near full TKE with passive overpressure and is pain free.  Benefits from cueing for technique and to prevent compensatory patterns.  Progress per Plan of Care and Progress strengthening /stabilization /functional activity           Timed:         Manual Therapy:    8     mins  04505;     Therapeutic Exercise:    20     mins  39079;     Neuromuscular Declan:        mins  05483;    Therapeutic Activity:     10     mins  34404;     Gait Training:           mins  38559;     Ultrasound:          mins  85534;    Ionto                                  mins  97468  Self Care                            mins  59456  Canalith Repos         mins  73213  Orthotic MGMT/Train         mins  87527    Un-Timed:  Electrical Stimulation:         mins  76557 ( );  Dry Needling:          mins  16008 self-pay;  Dry Needling:          mins   self-pay  Traction          mins   41103      Timed Treatment:   38   mins   Total Treatment:     38   mins        PT SIGNATURE: Rylee Agarwal PT     License Number: [id number]  Electronically signed by Rylee Agarwal PT, 02/14/23, 3:01 PM EST

## 2023-02-16 ENCOUNTER — TREATMENT (OUTPATIENT)
Dept: PHYSICAL THERAPY | Facility: CLINIC | Age: 83
End: 2023-02-16
Payer: MEDICARE

## 2023-02-16 DIAGNOSIS — M25.661 DECREASED ROM OF RIGHT KNEE: ICD-10-CM

## 2023-02-16 DIAGNOSIS — R26.89 IMPAIRED GAIT AND MOBILITY: ICD-10-CM

## 2023-02-16 DIAGNOSIS — M25.561 CHRONIC PAIN OF RIGHT KNEE: ICD-10-CM

## 2023-02-16 DIAGNOSIS — G89.29 CHRONIC PAIN OF RIGHT KNEE: ICD-10-CM

## 2023-02-16 DIAGNOSIS — M17.11 PRIMARY OSTEOARTHRITIS OF RIGHT KNEE: Primary | ICD-10-CM

## 2023-02-16 PROCEDURE — 97112 NEUROMUSCULAR REEDUCATION: CPT | Performed by: PHYSICAL THERAPIST

## 2023-02-16 PROCEDURE — 97110 THERAPEUTIC EXERCISES: CPT | Performed by: PHYSICAL THERAPIST

## 2023-02-16 PROCEDURE — 97530 THERAPEUTIC ACTIVITIES: CPT | Performed by: PHYSICAL THERAPIST

## 2023-02-16 NOTE — PROGRESS NOTES
Physical Therapy Treatment Note  [facility] [place]   [address]  [place] [address]  P: [phone]       F: [phone]      Patient: Tiff Del Rio   : 1940  Treatment Diagnosis:     ICD-10-CM ICD-9-CM   1. Primary osteoarthritis of right knee  M17.11 715.16   2. Impaired gait and mobility  R26.89 781.2   3. Decreased ROM of right knee  M25.661 719.56   4. Chronic pain of right knee  M25.561 719.46    G89.29 338.29     Referring practitioner: BROOKLYN Nam  Date of Initial Visit: Type: THERAPY  Noted: 2023  Today's Date: 2023  Patient seen for 7 sessions           Subjective   Patient reports her knee was a little sore after last session but it was very mild and didn't last long.      Objective     See Exercise, Manual, and Modality Logs for complete treatment.       Assessment/Plan  Patient performed program with progressed parameters and addition of exercise for dynamic stability and proprioception.  She continues to respond well to manual therapy with improved mobility.  She benefits for cueing and facilitation to ensure proper exercise technique and prevent compensatory patterns.  Progress per Plan of Care and Progress strengthening /stabilization /functional activity           Timed:         Manual Therapy:         mins  35139;     Therapeutic Exercise:    20     mins  08161;     Neuromuscular Declan:    8    mins  30906;    Therapeutic Activity:     10     mins  57113;     Gait Training:           mins  94317;     Ultrasound:          mins  95883;    Ionto                                  mins  15377  Self Care                            mins  28671  Canalith Repos         mins  02263  Orthotic MGMT/Train         mins  65183    Un-Timed:  Electrical Stimulation:         mins  41539 ( );  Dry Needling:          mins  91711 self-pay;  Dry Needling:          mins  71847 self-pay  Traction          mins  30958      Timed  Treatment:   38   mins   Total Treatment:     48   mins        PT SIGNATURE: Rylee Agarwal PT     License Number: [id number]  Electronically signed by Rylee Agarwal PT, 02/16/23, 3:09 PM EST

## 2023-02-23 ENCOUNTER — TREATMENT (OUTPATIENT)
Dept: PHYSICAL THERAPY | Facility: CLINIC | Age: 83
End: 2023-02-23
Payer: MEDICARE

## 2023-02-23 DIAGNOSIS — M17.11 PRIMARY OSTEOARTHRITIS OF RIGHT KNEE: Primary | ICD-10-CM

## 2023-02-23 DIAGNOSIS — G89.29 CHRONIC PAIN OF RIGHT KNEE: ICD-10-CM

## 2023-02-23 DIAGNOSIS — M25.661 DECREASED ROM OF RIGHT KNEE: ICD-10-CM

## 2023-02-23 DIAGNOSIS — R26.89 IMPAIRED GAIT AND MOBILITY: ICD-10-CM

## 2023-02-23 DIAGNOSIS — M25.561 CHRONIC PAIN OF RIGHT KNEE: ICD-10-CM

## 2023-02-23 PROCEDURE — 97112 NEUROMUSCULAR REEDUCATION: CPT | Performed by: PHYSICAL THERAPIST

## 2023-02-23 PROCEDURE — 97110 THERAPEUTIC EXERCISES: CPT | Performed by: PHYSICAL THERAPIST

## 2023-02-23 PROCEDURE — 97530 THERAPEUTIC ACTIVITIES: CPT | Performed by: PHYSICAL THERAPIST

## 2023-02-23 NOTE — PROGRESS NOTES
Physical Therapy Re-Assessment / Discharge Summary  [facility] [place]   [address]  [place] [address]  P: [phone]       F: [phone]        Patient: Tiff Del Rio   : 1940  Visit Diagnoses:     ICD-10-CM ICD-9-CM   1. Primary osteoarthritis of right knee  M17.11 715.16   2. Impaired gait and mobility  R26.89 781.2   3. Decreased ROM of right knee  M25.661 719.56   4. Chronic pain of right knee  M25.561 719.46    G89.29 338.29     Referring practitioner: BROOKLYN Nam  Date of Initial Visit: Type: THERAPY  Noted: 2023  Today's Date: 2023  Patient seen for 8 sessions      Subjective:   Tiff Del Rio reports:   Subjective Questionnaire: [place]   Clinical Progress: improved  Home Program Compliance: Yes  Treatment has included: therapeutic exercise, neuromuscular re-education, manual therapy, therapeutic activity and cryotherapy    Subjective   Patient reports she has not had any knee pain to speak of.  States she will get an achy pain if the weather is really cold.  States she is comfortable with her home exercise program and does not feel limited from any of her routine daily activities.    Objective     Palpation      No TTP         Active Range of Motion   Left Knee   Flexion: 130 degrees   Extension: 0 degrees      Right Knee   Flexion: 130 degrees (mild discomfort at end range)  Extensor la degrees (after stretching able to reach 0 degrees)     Strength/Myotome Testing      Left Knee   Flexion: 5  Extension: 5     Right Knee   Flexion: 5  Extension: 5  Quadriceps contraction: Good        Ambulation      Ambulation: Stairs   Ascend stairs: independent  Pattern: reciprocal  Railings: one rail  Descend stairs: independent  Pattern: reciprocal  Railings: one rail     Functional Assessment   Squat   Symmetrical, good form            See Exercise, Manual, and Modality Logs for complete treatment.      Assessment/Plan  Patient demonstrates improved right knee ROM, strength and LE muscle flexibility.  She is able to ascend/descend stairs using a reciprocal pattern.  Demonstrates proper form with functional squat patterns.  WOMAC score improved from 28/96 to 11/96.  She is independent in her HEP and symptom management.  Progress toward previous goals: All Met    Goal Review  Short Term Goals:  1.  Patient will have increased right knee flexion to 130 degrees.-met  2.  Patient will have increased right knee extension to 0 degrees.-met  3.  Patient will have increased right quad contraction to WNL.-met  4.  Patient will demonstrate normalized gait pattern.-met      Long Term Goals:  1.  Patient will be independent in performance of HEP for carryover upon discharge from skilled PT services.-met  2.  Patient will have right knee strength of 5/5.-met  3.  Patient will have improved WOMAC score of 18/96 or better.-met  4.  Patient will be able to perform functional squat patterns with good form.-met      Recommendations: Discharge to [facility]    PT SIGNATURE: Rylee Agarwal PT     License Number: [id number]  Electronically signed by Rylee Agarwal PT, 02/23/23, 3:26 PM EST        Timed:         Manual Therapy:         mins  67018;     Therapeutic Exercise:    15     mins  98827;     Neuromuscular Declan:    8    mins  34352;    Therapeutic Activity:     15     mins  10407;     Gait Training:           mins  48273;     Ultrasound:          mins  37091;    Ionto                                  mins  69129  Self Care                            mins  67534  Canalith Repos         mins  46640  Orthotic MGMT/Train         mins  05590    Un-Timed:  Electrical Stimulation:         mins  02336 ( );  Dry Needling:          mins  44675 self-pay;  Dry Needling:          mins  26069 self-pay  Traction          mins  95156  Low Eval          mins  11324  Mod Eval          mins  77444  High Eval                            mins   41184    Timed Treatment:   38   mins   Total Treatment:     38   mins

## 2023-03-02 ENCOUNTER — CLINICAL SUPPORT (OUTPATIENT)
Dept: ORTHOPEDIC SURGERY | Facility: CLINIC | Age: 83
End: 2023-03-02
Payer: MEDICARE

## 2023-03-02 VITALS — HEIGHT: 64 IN | BODY MASS INDEX: 25.61 KG/M2 | TEMPERATURE: 97.1 F | WEIGHT: 150 LBS

## 2023-03-02 DIAGNOSIS — M25.562 PAIN IN BOTH KNEES, UNSPECIFIED CHRONICITY: Primary | ICD-10-CM

## 2023-03-02 DIAGNOSIS — M25.561 PAIN IN BOTH KNEES, UNSPECIFIED CHRONICITY: Primary | ICD-10-CM

## 2023-03-02 PROCEDURE — 20610 DRAIN/INJ JOINT/BURSA W/O US: CPT | Performed by: NURSE PRACTITIONER

## 2023-03-02 RX ORDER — METHYLPREDNISOLONE ACETATE 80 MG/ML
80 INJECTION, SUSPENSION INTRA-ARTICULAR; INTRALESIONAL; INTRAMUSCULAR; SOFT TISSUE
Status: COMPLETED | OUTPATIENT
Start: 2023-03-02 | End: 2023-03-02

## 2023-03-02 RX ADMIN — METHYLPREDNISOLONE ACETATE 80 MG: 80 INJECTION, SUSPENSION INTRA-ARTICULAR; INTRALESIONAL; INTRAMUSCULAR; SOFT TISSUE at 11:00

## 2023-03-02 NOTE — PROGRESS NOTES
3/2/2023    Tiff Dle Rio is here today for worsening knee pain. Pt has undergone injection of the knee in the past with good resolution of symptoms. Pt is requesting a repeat injection.     KNEE Injection Procedure Note:    Large Joint Arthrocentesis: R knee  Date/Time: 3/2/2023 11:00 AM  Consent given by: patient  Site marked: site marked  Timeout: Immediately prior to procedure a time out was called to verify the correct patient, procedure, equipment, support staff and site/side marked as required   Supporting Documentation  Indications: pain and joint swelling   Procedure Details  Location: knee - R knee  Preparation: Patient was prepped and draped in the usual sterile fashion  Needle size: 22 G  Approach: anterolateral  Medications administered: 80 mg methylPREDNISolone acetate 80 MG/ML; 2 mL lidocaine (cardiac)  Patient tolerance: patient tolerated the procedure well with no immediate complications    Large Joint Arthrocentesis: L knee  Date/Time: 3/2/2023 11:00 AM  Consent given by: patient  Site marked: site marked  Timeout: Immediately prior to procedure a time out was called to verify the correct patient, procedure, equipment, support staff and site/side marked as required   Supporting Documentation  Indications: pain and joint swelling   Procedure Details  Location: knee - L knee  Preparation: Patient was prepped and draped in the usual sterile fashion  Needle size: 22 G  Approach: anterolateral  Medications administered: 80 mg methylPREDNISolone acetate 80 MG/ML; 2 mL lidocaine (cardiac)  Patient tolerance: patient tolerated the procedure well with no immediate complications          At the conclusion of the injection I discussed the importance of continued quad strengthening exercises on a daily basis. I will see the patient back if the symptoms should fail to improve or worsen.    Sandy Castaneda, APRN  3/2/2023

## 2023-06-06 ENCOUNTER — CLINICAL SUPPORT (OUTPATIENT)
Dept: ORTHOPEDIC SURGERY | Facility: CLINIC | Age: 83
End: 2023-06-06
Payer: MEDICARE

## 2023-06-06 VITALS — BODY MASS INDEX: 25.01 KG/M2 | WEIGHT: 146.5 LBS | TEMPERATURE: 96.9 F | HEIGHT: 64 IN

## 2023-06-06 DIAGNOSIS — M25.562 PAIN IN BOTH KNEES, UNSPECIFIED CHRONICITY: Primary | ICD-10-CM

## 2023-06-06 DIAGNOSIS — M25.561 PAIN IN BOTH KNEES, UNSPECIFIED CHRONICITY: Primary | ICD-10-CM

## 2023-06-06 RX ORDER — METHYLPREDNISOLONE ACETATE 80 MG/ML
80 INJECTION, SUSPENSION INTRA-ARTICULAR; INTRALESIONAL; INTRAMUSCULAR; SOFT TISSUE
Status: COMPLETED | OUTPATIENT
Start: 2023-06-06 | End: 2023-06-06

## 2023-06-06 RX ADMIN — METHYLPREDNISOLONE ACETATE 80 MG: 80 INJECTION, SUSPENSION INTRA-ARTICULAR; INTRALESIONAL; INTRAMUSCULAR; SOFT TISSUE at 13:49

## 2023-06-06 NOTE — PROGRESS NOTES
6/6/2023    Tiff Del Rio is here today for worsening knee pain. Pt has undergone injection of the knee in the past with good resolution of symptoms. Pt is requesting a repeat injection.     KNEE Injection Procedure Note:    Large Joint Arthrocentesis: R knee  Date/Time: 6/6/2023 1:49 PM  Consent given by: patient  Site marked: site marked  Timeout: Immediately prior to procedure a time out was called to verify the correct patient, procedure, equipment, support staff and site/side marked as required   Supporting Documentation  Indications: pain and joint swelling   Procedure Details  Location: knee - R knee  Preparation: Patient was prepped and draped in the usual sterile fashion  Needle size: 22 G  Approach: anterolateral  Medications administered: 80 mg methylPREDNISolone acetate 80 MG/ML; 2 mL lidocaine (cardiac)  Patient tolerance: patient tolerated the procedure well with no immediate complications      Large Joint Arthrocentesis: L knee  Date/Time: 6/6/2023 1:49 PM  Consent given by: patient  Site marked: site marked  Timeout: Immediately prior to procedure a time out was called to verify the correct patient, procedure, equipment, support staff and site/side marked as required   Supporting Documentation  Indications: pain and joint swelling   Procedure Details  Location: knee - L knee  Preparation: Patient was prepped and draped in the usual sterile fashion  Needle size: 22 G  Approach: anterolateral  Medications administered: 80 mg methylPREDNISolone acetate 80 MG/ML; 2 mL lidocaine (cardiac)  Patient tolerance: patient tolerated the procedure well with no immediate complications      At the conclusion of the injection I discussed the importance of continued quad strengthening exercises on a daily basis. I will see the patient back if the symptoms should fail to improve or worsen.    Sandy Castaneda, APRN  6/6/2023

## 2023-07-25 ENCOUNTER — TRANSCRIBE ORDERS (OUTPATIENT)
Dept: ADMINISTRATIVE | Facility: HOSPITAL | Age: 83
End: 2023-07-25
Payer: MEDICARE

## 2023-07-25 DIAGNOSIS — Z85.850 PERSONAL HISTORY OF MALIGNANT NEOPLASM OF THYROID: Primary | ICD-10-CM

## 2023-08-16 ENCOUNTER — HOSPITAL ENCOUNTER (OUTPATIENT)
Dept: ULTRASOUND IMAGING | Facility: HOSPITAL | Age: 83
Discharge: HOME | End: 2023-08-16
Admitting: OTOLARYNGOLOGY
Payer: MEDICARE

## 2023-08-16 DIAGNOSIS — Z85.850 PERSONAL HISTORY OF MALIGNANT NEOPLASM OF THYROID: ICD-10-CM

## 2023-08-16 PROCEDURE — 76536 US EXAM OF HEAD AND NECK: CPT

## 2023-09-08 ENCOUNTER — CLINICAL SUPPORT (OUTPATIENT)
Dept: ORTHOPEDIC SURGERY | Facility: CLINIC | Age: 83
End: 2023-09-08
Payer: MEDICARE

## 2023-09-08 VITALS — TEMPERATURE: 97.5 F | HEIGHT: 64 IN | BODY MASS INDEX: 24.24 KG/M2 | WEIGHT: 142 LBS

## 2023-09-08 DIAGNOSIS — M17.0 PRIMARY OSTEOARTHRITIS OF BOTH KNEES: Primary | ICD-10-CM

## 2023-09-08 RX ORDER — METHYLPREDNISOLONE ACETATE 80 MG/ML
80 INJECTION, SUSPENSION INTRA-ARTICULAR; INTRALESIONAL; INTRAMUSCULAR; SOFT TISSUE
Status: COMPLETED | OUTPATIENT
Start: 2023-09-08 | End: 2023-09-08

## 2023-09-08 RX ADMIN — METHYLPREDNISOLONE ACETATE 80 MG: 80 INJECTION, SUSPENSION INTRA-ARTICULAR; INTRALESIONAL; INTRAMUSCULAR; SOFT TISSUE at 14:35

## 2023-09-08 NOTE — PROGRESS NOTES
9/8/2023    Tiff Del Rio is here today for worsening knee pain. Pt has undergone injection of the knee in the past with good resolution of symptoms. Pt is requesting a repeat injection.     KNEE Injection Procedure Note:    Large Joint Arthrocentesis: R knee  Date/Time: 9/8/2023 2:35 PM  Consent given by: patient  Site marked: site marked  Timeout: Immediately prior to procedure a time out was called to verify the correct patient, procedure, equipment, support staff and site/side marked as required   Supporting Documentation  Indications: pain and joint swelling   Procedure Details  Location: knee - R knee  Preparation: Patient was prepped and draped in the usual sterile fashion  Needle size: 22 G  Approach: anterolateral  Medications administered: 80 mg methylPREDNISolone acetate 80 MG/ML; 2 mL lidocaine (cardiac)  Patient tolerance: patient tolerated the procedure well with no immediate complications      Large Joint Arthrocentesis: L knee  Date/Time: 9/8/2023 2:35 PM  Consent given by: patient  Site marked: site marked  Timeout: Immediately prior to procedure a time out was called to verify the correct patient, procedure, equipment, support staff and site/side marked as required   Supporting Documentation  Indications: pain and joint swelling   Procedure Details  Location: knee - L knee  Preparation: Patient was prepped and draped in the usual sterile fashion  Needle size: 22 G  Approach: anterolateral  Medications administered: 80 mg methylPREDNISolone acetate 80 MG/ML; 2 mL lidocaine (cardiac)  Patient tolerance: patient tolerated the procedure well with no immediate complications      At the conclusion of the injection I discussed the importance of continued quad strengthening exercises on a daily basis. I will see the patient back if the symptoms should fail to improve or worsen.    Sandy Castaneda, APRN  9/8/2023

## 2023-12-08 ENCOUNTER — CLINICAL SUPPORT (OUTPATIENT)
Dept: ORTHOPEDIC SURGERY | Facility: CLINIC | Age: 83
End: 2023-12-08
Payer: MEDICARE

## 2023-12-08 VITALS — TEMPERATURE: 97.3 F | BODY MASS INDEX: 23.9 KG/M2 | WEIGHT: 140 LBS | HEIGHT: 64 IN

## 2023-12-08 DIAGNOSIS — M17.0 PRIMARY OSTEOARTHRITIS OF BOTH KNEES: Primary | ICD-10-CM

## 2023-12-08 RX ORDER — METHYLPREDNISOLONE ACETATE 80 MG/ML
80 INJECTION, SUSPENSION INTRA-ARTICULAR; INTRALESIONAL; INTRAMUSCULAR; SOFT TISSUE
Status: COMPLETED | OUTPATIENT
Start: 2023-12-08 | End: 2023-12-08

## 2023-12-08 RX ADMIN — METHYLPREDNISOLONE ACETATE 80 MG: 80 INJECTION, SUSPENSION INTRA-ARTICULAR; INTRALESIONAL; INTRAMUSCULAR; SOFT TISSUE at 15:14

## 2023-12-08 RX ADMIN — METHYLPREDNISOLONE ACETATE 80 MG: 80 INJECTION, SUSPENSION INTRA-ARTICULAR; INTRALESIONAL; INTRAMUSCULAR; SOFT TISSUE at 15:13

## 2023-12-08 NOTE — PROGRESS NOTES
12/8/2023    Tiff Del Rio is here today for worsening knee pain. Pt has undergone injection of the knee in the past with good resolution of symptoms. Pt is requesting a repeat injection.     KNEE Injection Procedure Note:    Large Joint Arthrocentesis: R knee  Date/Time: 12/8/2023 3:13 PM  Consent given by: patient  Site marked: site marked  Timeout: Immediately prior to procedure a time out was called to verify the correct patient, procedure, equipment, support staff and site/side marked as required   Supporting Documentation  Indications: pain and joint swelling   Procedure Details  Location: knee - R knee  Preparation: Patient was prepped and draped in the usual sterile fashion  Needle size: 22 G  Approach: anterolateral  Medications administered: 80 mg methylPREDNISolone acetate 80 MG/ML; 2 mL lidocaine (cardiac)  Patient tolerance: patient tolerated the procedure well with no immediate complications      Large Joint Arthrocentesis: L knee  Date/Time: 12/8/2023 3:14 PM  Consent given by: patient  Site marked: site marked  Timeout: Immediately prior to procedure a time out was called to verify the correct patient, procedure, equipment, support staff and site/side marked as required   Supporting Documentation  Indications: pain and joint swelling   Procedure Details  Location: knee - L knee  Preparation: Patient was prepped and draped in the usual sterile fashion  Needle size: 22 G  Approach: anterolateral  Medications administered: 80 mg methylPREDNISolone acetate 80 MG/ML; 2 mL lidocaine (cardiac)  Patient tolerance: patient tolerated the procedure well with no immediate complications      At the conclusion of the injection I discussed the importance of continued quad strengthening exercises on a daily basis. I will see the patient back if the symptoms should fail to improve or worsen.    Sandy Castaneda, APRN  12/8/2023

## 2024-03-12 ENCOUNTER — OFFICE VISIT (OUTPATIENT)
Dept: ORTHOPEDIC SURGERY | Facility: CLINIC | Age: 84
End: 2024-03-12
Payer: MEDICARE

## 2024-03-12 VITALS — WEIGHT: 142.2 LBS | TEMPERATURE: 98.6 F | HEIGHT: 64 IN | BODY MASS INDEX: 24.28 KG/M2

## 2024-03-12 DIAGNOSIS — M17.0 PRIMARY OSTEOARTHRITIS OF BOTH KNEES: ICD-10-CM

## 2024-03-12 DIAGNOSIS — R52 PAIN: Primary | ICD-10-CM

## 2024-03-12 RX ORDER — METHYLPREDNISOLONE ACETATE 80 MG/ML
80 INJECTION, SUSPENSION INTRA-ARTICULAR; INTRALESIONAL; INTRAMUSCULAR; SOFT TISSUE
Status: COMPLETED | OUTPATIENT
Start: 2024-03-12 | End: 2024-03-12

## 2024-03-12 RX ORDER — LIDOCAINE HYDROCHLORIDE 10 MG/ML
2 INJECTION, SOLUTION EPIDURAL; INFILTRATION; INTRACAUDAL; PERINEURAL
Status: COMPLETED | OUTPATIENT
Start: 2024-03-12 | End: 2024-03-12

## 2024-03-12 RX ADMIN — METHYLPREDNISOLONE ACETATE 80 MG: 80 INJECTION, SUSPENSION INTRA-ARTICULAR; INTRALESIONAL; INTRAMUSCULAR; SOFT TISSUE at 13:33

## 2024-03-12 RX ADMIN — LIDOCAINE HYDROCHLORIDE 2 ML: 10 INJECTION, SOLUTION EPIDURAL; INFILTRATION; INTRACAUDAL; PERINEURAL at 13:33

## 2024-03-12 NOTE — PROGRESS NOTES
3/12/2024    Tiff Del Rio is here today for worsening knee pain. Pt has undergone injection of the knee in the past with good resolution of symptoms. Pt is requesting a repeat injection.     KNEE Injection Procedure Note:    Large Joint Arthrocentesis: R knee  Date/Time: 3/12/2024 1:33 PM  Consent given by: patient  Site marked: site marked  Timeout: Immediately prior to procedure a time out was called to verify the correct patient, procedure, equipment, support staff and site/side marked as required   Supporting Documentation  Indications: pain   Procedure Details  Location: knee - R knee  Preparation: Patient was prepped and draped in the usual sterile fashion  Needle gauge: 21.  Approach: anterolateral  Medications administered: 80 mg methylPREDNISolone acetate 80 MG/ML; 2 mL lidocaine PF 1% 1 %  Patient tolerance: patient tolerated the procedure well with no immediate complications      Large Joint Arthrocentesis: L knee  Date/Time: 3/12/2024 1:33 PM  Consent given by: patient  Site marked: site marked  Timeout: Immediately prior to procedure a time out was called to verify the correct patient, procedure, equipment, support staff and site/side marked as required   Supporting Documentation  Indications: pain   Procedure Details  Location: knee - L knee  Preparation: Patient was prepped and draped in the usual sterile fashion  Needle gauge: 21.  Approach: anterolateral  Medications administered: 80 mg methylPREDNISolone acetate 80 MG/ML; 2 mL lidocaine PF 1% 1 %  Patient tolerance: patient tolerated the procedure well with no immediate complications      At the conclusion of the injection I discussed the importance of continued quad strengthening exercises on a daily basis. I will see the patient back if the symptoms should fail to improve or worsen.    Sandy Castaneda, APRN

## 2024-06-20 ENCOUNTER — OFFICE VISIT (OUTPATIENT)
Dept: ORTHOPEDIC SURGERY | Facility: CLINIC | Age: 84
End: 2024-06-20
Payer: MEDICARE

## 2024-06-20 VITALS — BODY MASS INDEX: 24.41 KG/M2 | WEIGHT: 143 LBS | HEIGHT: 64 IN | TEMPERATURE: 98.6 F

## 2024-06-20 DIAGNOSIS — M17.0 PRIMARY OSTEOARTHRITIS OF BOTH KNEES: Primary | ICD-10-CM

## 2024-06-20 RX ORDER — LIDOCAINE HYDROCHLORIDE 10 MG/ML
2 INJECTION, SOLUTION EPIDURAL; INFILTRATION; INTRACAUDAL; PERINEURAL
Status: COMPLETED | OUTPATIENT
Start: 2024-06-20 | End: 2024-06-20

## 2024-06-20 RX ORDER — METHYLPREDNISOLONE ACETATE 80 MG/ML
80 INJECTION, SUSPENSION INTRA-ARTICULAR; INTRALESIONAL; INTRAMUSCULAR; SOFT TISSUE
Status: COMPLETED | OUTPATIENT
Start: 2024-06-20 | End: 2024-06-20

## 2024-06-20 RX ADMIN — LIDOCAINE HYDROCHLORIDE 2 ML: 10 INJECTION, SOLUTION EPIDURAL; INFILTRATION; INTRACAUDAL; PERINEURAL at 11:12

## 2024-06-20 RX ADMIN — METHYLPREDNISOLONE ACETATE 80 MG: 80 INJECTION, SUSPENSION INTRA-ARTICULAR; INTRALESIONAL; INTRAMUSCULAR; SOFT TISSUE at 11:12

## 2024-06-20 NOTE — PROGRESS NOTES
6/20/2024    Tiff Del Rio is here today for worsening knee pain. Pt has undergone injection of the knee in the past with good resolution of symptoms. Pt is requesting a repeat injection.     KNEE Injection Procedure Note:    Large Joint Arthrocentesis: R knee  Date/Time: 6/20/2024 11:12 AM  Consent given by: patient  Site marked: site marked  Timeout: Immediately prior to procedure a time out was called to verify the correct patient, procedure, equipment, support staff and site/side marked as required   Supporting Documentation  Indications: pain   Procedure Details  Location: knee - R knee  Preparation: Patient was prepped and draped in the usual sterile fashion  Needle gauge: 21.  Approach: anterolateral  Medications administered: 80 mg methylPREDNISolone acetate 80 MG/ML; 2 mL lidocaine PF 1% 1 %  Patient tolerance: patient tolerated the procedure well with no immediate complications      Large Joint Arthrocentesis: L knee  Date/Time: 6/20/2024 11:12 AM  Supporting Documentation  Indications: pain   Procedure Details  Location: knee - L knee  Preparation: Patient was prepped and draped in the usual sterile fashion  Needle gauge: 21.  Approach: anterolateral  Medications administered: 80 mg methylPREDNISolone acetate 80 MG/ML; 2 mL lidocaine PF 1% 1 %  Patient tolerance: patient tolerated the procedure well with no immediate complications      At the conclusion of the injection I discussed the importance of continued quad strengthening exercises on a daily basis. I will see the patient back if the symptoms should fail to improve or worsen.    Sandy Castaneda, APRN

## 2024-10-01 ENCOUNTER — OFFICE VISIT (OUTPATIENT)
Dept: ORTHOPEDIC SURGERY | Facility: CLINIC | Age: 84
End: 2024-10-01
Payer: MEDICARE

## 2024-10-01 VITALS — HEIGHT: 64 IN | BODY MASS INDEX: 24.14 KG/M2 | WEIGHT: 141.4 LBS | TEMPERATURE: 97.5 F

## 2024-10-01 DIAGNOSIS — M17.0 PRIMARY OSTEOARTHRITIS OF BOTH KNEES: Primary | ICD-10-CM

## 2024-10-01 RX ORDER — IRBESARTAN 300 MG/1
300 TABLET ORAL
COMMUNITY

## 2024-10-01 RX ORDER — AMLODIPINE BESYLATE 10 MG/1
10 TABLET ORAL DAILY
COMMUNITY

## 2024-10-01 RX ORDER — ALENDRONATE SODIUM 70 MG/1
70 TABLET ORAL
COMMUNITY
Start: 2024-06-18 | End: 2025-06-18

## 2024-10-01 RX ORDER — METHYLPREDNISOLONE ACETATE 80 MG/ML
80 INJECTION, SUSPENSION INTRA-ARTICULAR; INTRALESIONAL; INTRAMUSCULAR; SOFT TISSUE
Status: COMPLETED | OUTPATIENT
Start: 2024-10-01 | End: 2024-10-01

## 2024-10-01 RX ORDER — DOXAZOSIN 2 MG/1
2 TABLET ORAL NIGHTLY
COMMUNITY

## 2024-10-01 RX ADMIN — METHYLPREDNISOLONE ACETATE 80 MG: 80 INJECTION, SUSPENSION INTRA-ARTICULAR; INTRALESIONAL; INTRAMUSCULAR; SOFT TISSUE at 09:42

## 2024-10-01 NOTE — PROGRESS NOTES
10/1/2024    Tiff Del Rio is here today for worsening knee pain. Pt has undergone injection of the knee in the past with good resolution of symptoms. Pt is requesting a repeat injection.     KNEE Injection Procedure Note:    Large Joint Arthrocentesis: R knee  Date/Time: 10/1/2024 9:42 AM  Consent given by: patient  Site marked: site marked  Timeout: Immediately prior to procedure a time out was called to verify the correct patient, procedure, equipment, support staff and site/side marked as required   Supporting Documentation  Indications: pain and joint swelling   Procedure Details  Location: knee - R knee  Preparation: Patient was prepped and draped in the usual sterile fashion  Needle size: 22 G  Approach: anterolateral  Medications administered: 80 mg methylPREDNISolone acetate 80 MG/ML; 2 mL lidocaine (cardiac)  Patient tolerance: patient tolerated the procedure well with no immediate complications      Large Joint Arthrocentesis: L knee  Date/Time: 10/1/2024 9:42 AM  Consent given by: patient  Site marked: site marked  Timeout: Immediately prior to procedure a time out was called to verify the correct patient, procedure, equipment, support staff and site/side marked as required   Supporting Documentation  Indications: pain and joint swelling   Procedure Details  Location: knee - L knee  Preparation: Patient was prepped and draped in the usual sterile fashion  Needle size: 22 G  Approach: anterolateral  Medications administered: 80 mg methylPREDNISolone acetate 80 MG/ML; 2 mL lidocaine (cardiac)  Patient tolerance: patient tolerated the procedure well with no immediate complications      At the conclusion of the injection I discussed the importance of continued quad strengthening exercises on a daily basis. I will see the patient back if the symptoms should fail to improve or worsen.    Sandy Castaneda, APRN  10/1/2024

## 2024-10-25 NOTE — PROGRESS NOTES
Subjective   Tiff Del Rio is a 77 y.o. female presenting with   Chief Complaint   Patient presents with   • Follow-up     hospital, she fell hurt right leg below knee        77-year-old  white female nonsmoker was carrying her step ladder when the leg caught on the handle of a broom.  This caused her to fall forward and her right knee landed on the step of the step ladder.  She had a lot of initial pain and bruising and so went to an urgent care.  X-rays were done there that showed no fracture or dislocation.  She was given a knee immobilizer and is here for follow-up.  She says it feels a lot better and she does not complain of any neck pain or back pain or hip pain either.         The following portions of the patient's history were reviewed and updated as appropriate: current medications, past family history, past medical history, past social history, past surgical history and problem list.    Review of Systems   Musculoskeletal: Positive for arthralgias.   All other systems reviewed and are negative.      Objective   Physical Exam   Constitutional: She is oriented to person, place, and time. She appears well-developed and well-nourished.   HENT:   Head: Normocephalic and atraumatic.   Eyes: EOM are normal. Pupils are equal, round, and reactive to light.   Neck: Normal range of motion. Neck supple. No JVD present. No thyromegaly present.   Cardiovascular: Normal rate and regular rhythm.    Pulmonary/Chest: Effort normal and breath sounds normal.   Musculoskeletal: Normal range of motion. She exhibits no edema, tenderness or deformity.        Right knee: She exhibits normal range of motion, no swelling and no deformity.        Legs:  Lymphadenopathy:     She has no cervical adenopathy.   Neurological: She is alert and oriented to person, place, and time.   Skin: Skin is warm and dry.   Psychiatric: She has a normal mood and affect. Her behavior is normal.   Nursing note and vitals  reviewed.      Assessment/Plan   Tiff was seen today for follow-up.    Diagnoses and all orders for this visit:    Contusion of knee and lower leg, left, subsequent encounter    Hypothyroidism (acquired)    Essential hypertension                   I would like him to return for another visit in 6 month(s)   No

## 2025-01-14 ENCOUNTER — OFFICE VISIT (OUTPATIENT)
Dept: ORTHOPEDIC SURGERY | Facility: CLINIC | Age: 85
End: 2025-01-14
Payer: MEDICARE

## 2025-01-14 VITALS — HEIGHT: 64 IN | WEIGHT: 147.2 LBS | TEMPERATURE: 98.2 F | BODY MASS INDEX: 25.13 KG/M2

## 2025-01-14 DIAGNOSIS — M17.0 PRIMARY OSTEOARTHRITIS OF BOTH KNEES: Primary | ICD-10-CM

## 2025-01-14 RX ORDER — ERGOCALCIFEROL 1.25 MG/1
50000 CAPSULE, LIQUID FILLED ORAL
COMMUNITY
Start: 2024-12-04 | End: 2025-02-26

## 2025-01-14 RX ORDER — METHYLPREDNISOLONE ACETATE 80 MG/ML
80 INJECTION, SUSPENSION INTRA-ARTICULAR; INTRALESIONAL; INTRAMUSCULAR; SOFT TISSUE
Status: COMPLETED | OUTPATIENT
Start: 2025-01-14 | End: 2025-01-14

## 2025-01-14 RX ADMIN — METHYLPREDNISOLONE ACETATE 80 MG: 80 INJECTION, SUSPENSION INTRA-ARTICULAR; INTRALESIONAL; INTRAMUSCULAR; SOFT TISSUE at 13:35

## 2025-01-14 NOTE — PROGRESS NOTES
1/14/2025    Tiff Del Rio is here today for worsening knee pain. Pt has undergone injection of the knee in the past with good resolution of symptoms. Pt is requesting a repeat injection.     KNEE Injection Procedure Note:    Large Joint Arthrocentesis: R knee  Date/Time: 1/14/2025 1:35 PM  Consent given by: patient  Site marked: site marked  Timeout: Immediately prior to procedure a time out was called to verify the correct patient, procedure, equipment, support staff and site/side marked as required   Supporting Documentation  Indications: pain and joint swelling   Procedure Details  Location: knee - R knee  Preparation: Patient was prepped and draped in the usual sterile fashion  Needle size: 22 G  Approach: anterolateral  Medications administered: 80 mg methylPREDNISolone acetate 80 MG/ML; 2 mL lidocaine (cardiac)  Patient tolerance: patient tolerated the procedure well with no immediate complications      Large Joint Arthrocentesis: L knee  Date/Time: 1/14/2025 1:35 PM  Consent given by: patient  Site marked: site marked  Timeout: Immediately prior to procedure a time out was called to verify the correct patient, procedure, equipment, support staff and site/side marked as required   Supporting Documentation  Indications: pain and joint swelling   Procedure Details  Location: knee - L knee  Preparation: Patient was prepped and draped in the usual sterile fashion  Needle size: 22 G  Approach: anterolateral  Medications administered: 80 mg methylPREDNISolone acetate 80 MG/ML; 2 mL lidocaine (cardiac)  Patient tolerance: patient tolerated the procedure well with no immediate complications      At the conclusion of the injection I discussed the importance of continued quad strengthening exercises on a daily basis. I will see the patient back if the symptoms should fail to improve or worsen.    Sandy Castaneda, APRN  1/14/2025

## 2025-04-17 ENCOUNTER — APPOINTMENT (OUTPATIENT)
Dept: GENERAL RADIOLOGY | Facility: HOSPITAL | Age: 85
End: 2025-04-17
Payer: MEDICARE

## 2025-04-17 ENCOUNTER — APPOINTMENT (OUTPATIENT)
Dept: MRI IMAGING | Facility: HOSPITAL | Age: 85
End: 2025-04-17
Payer: MEDICARE

## 2025-04-17 ENCOUNTER — HOSPITAL ENCOUNTER (OUTPATIENT)
Facility: HOSPITAL | Age: 85
Setting detail: OBSERVATION
Discharge: HOME | End: 2025-04-18
Attending: STUDENT IN AN ORGANIZED HEALTH CARE EDUCATION/TRAINING PROGRAM | Admitting: EMERGENCY MEDICINE
Payer: MEDICARE

## 2025-04-17 ENCOUNTER — APPOINTMENT (OUTPATIENT)
Dept: CT IMAGING | Facility: HOSPITAL | Age: 85
End: 2025-04-17
Payer: MEDICARE

## 2025-04-17 DIAGNOSIS — G45.9 TIA (TRANSIENT ISCHEMIC ATTACK): Primary | ICD-10-CM

## 2025-04-17 PROBLEM — R29.90 STROKE-LIKE SYMPTOMS: Status: ACTIVE | Noted: 2025-04-17

## 2025-04-17 LAB
ALBUMIN SERPL-MCNC: 4.1 G/DL (ref 3.5–5.2)
ALBUMIN/GLOB SERPL: 1.5 G/DL
ALP SERPL-CCNC: 86 U/L (ref 39–117)
ALT SERPL W P-5'-P-CCNC: 7 U/L (ref 1–33)
ANION GAP SERPL CALCULATED.3IONS-SCNC: 10.7 MMOL/L (ref 5–15)
APTT PPP: 31.5 SECONDS (ref 22.7–35.4)
AST SERPL-CCNC: 16 U/L (ref 1–32)
BASOPHILS # BLD AUTO: 0.05 10*3/MM3 (ref 0–0.2)
BASOPHILS NFR BLD AUTO: 0.6 % (ref 0–1.5)
BILIRUB SERPL-MCNC: 0.6 MG/DL (ref 0–1.2)
BUN SERPL-MCNC: 14 MG/DL (ref 8–23)
BUN/CREAT SERPL: 16.9 (ref 7–25)
CALCIUM SPEC-SCNC: 10 MG/DL (ref 8.6–10.5)
CHLORIDE SERPL-SCNC: 105 MMOL/L (ref 98–107)
CO2 SERPL-SCNC: 26.3 MMOL/L (ref 22–29)
CREAT SERPL-MCNC: 0.83 MG/DL (ref 0.57–1)
DEPRECATED RDW RBC AUTO: 40.5 FL (ref 37–54)
EGFRCR SERPLBLD CKD-EPI 2021: 68.8 ML/MIN/1.73
EOSINOPHIL # BLD AUTO: 0.12 10*3/MM3 (ref 0–0.4)
EOSINOPHIL NFR BLD AUTO: 1.5 % (ref 0.3–6.2)
ERYTHROCYTE [DISTWIDTH] IN BLOOD BY AUTOMATED COUNT: 12.6 % (ref 12.3–15.4)
GEN 5 1HR TROPONIN T REFLEX: 8 NG/L
GLOBULIN UR ELPH-MCNC: 2.8 GM/DL
GLUCOSE BLDC GLUCOMTR-MCNC: 90 MG/DL (ref 70–130)
GLUCOSE BLDC GLUCOMTR-MCNC: 90 MG/DL (ref 70–130)
GLUCOSE SERPL-MCNC: 93 MG/DL (ref 65–99)
HCT VFR BLD AUTO: 40.2 % (ref 34–46.6)
HGB BLD-MCNC: 13.5 G/DL (ref 12–15.9)
IMM GRANULOCYTES # BLD AUTO: 0.03 10*3/MM3 (ref 0–0.05)
IMM GRANULOCYTES NFR BLD AUTO: 0.4 % (ref 0–0.5)
INR PPP: 1.14 (ref 0.9–1.1)
LYMPHOCYTES # BLD AUTO: 2.66 10*3/MM3 (ref 0.7–3.1)
LYMPHOCYTES NFR BLD AUTO: 32.7 % (ref 19.6–45.3)
MAGNESIUM SERPL-MCNC: 1.9 MG/DL (ref 1.6–2.4)
MCH RBC QN AUTO: 29.4 PG (ref 26.6–33)
MCHC RBC AUTO-ENTMCNC: 33.6 G/DL (ref 31.5–35.7)
MCV RBC AUTO: 87.6 FL (ref 79–97)
MONOCYTES # BLD AUTO: 0.73 10*3/MM3 (ref 0.1–0.9)
MONOCYTES NFR BLD AUTO: 9 % (ref 5–12)
NEUTROPHILS NFR BLD AUTO: 4.54 10*3/MM3 (ref 1.7–7)
NEUTROPHILS NFR BLD AUTO: 55.8 % (ref 42.7–76)
NRBC BLD AUTO-RTO: 0 /100 WBC (ref 0–0.2)
NT-PROBNP SERPL-MCNC: 277 PG/ML (ref 0–1800)
PLATELET # BLD AUTO: 197 10*3/MM3 (ref 140–450)
PMV BLD AUTO: 10.4 FL (ref 6–12)
POTASSIUM SERPL-SCNC: 3.6 MMOL/L (ref 3.5–5.2)
PROT SERPL-MCNC: 6.9 G/DL (ref 6–8.5)
PROTHROMBIN TIME: 14.5 SECONDS (ref 11.7–14.2)
RBC # BLD AUTO: 4.59 10*6/MM3 (ref 3.77–5.28)
SODIUM SERPL-SCNC: 142 MMOL/L (ref 136–145)
T4 FREE SERPL-MCNC: 1.77 NG/DL (ref 0.92–1.68)
TROPONIN T NUMERIC DELTA: 0 NG/L
TROPONIN T SERPL HS-MCNC: 8 NG/L
TSH SERPL DL<=0.05 MIU/L-ACNC: 0.04 UIU/ML (ref 0.27–4.2)
WBC NRBC COR # BLD AUTO: 8.13 10*3/MM3 (ref 3.4–10.8)

## 2025-04-17 PROCEDURE — 84443 ASSAY THYROID STIM HORMONE: CPT | Performed by: NURSE PRACTITIONER

## 2025-04-17 PROCEDURE — 25510000001 IOPAMIDOL PER 1 ML: Performed by: STUDENT IN AN ORGANIZED HEALTH CARE EDUCATION/TRAINING PROGRAM

## 2025-04-17 PROCEDURE — G0378 HOSPITAL OBSERVATION PER HR: HCPCS

## 2025-04-17 PROCEDURE — 70496 CT ANGIOGRAPHY HEAD: CPT

## 2025-04-17 PROCEDURE — 83880 ASSAY OF NATRIURETIC PEPTIDE: CPT | Performed by: STUDENT IN AN ORGANIZED HEALTH CARE EDUCATION/TRAINING PROGRAM

## 2025-04-17 PROCEDURE — 93005 ELECTROCARDIOGRAM TRACING: CPT | Performed by: STUDENT IN AN ORGANIZED HEALTH CARE EDUCATION/TRAINING PROGRAM

## 2025-04-17 PROCEDURE — 36415 COLL VENOUS BLD VENIPUNCTURE: CPT

## 2025-04-17 PROCEDURE — 71045 X-RAY EXAM CHEST 1 VIEW: CPT

## 2025-04-17 PROCEDURE — 99285 EMERGENCY DEPT VISIT HI MDM: CPT

## 2025-04-17 PROCEDURE — A9577 INJ MULTIHANCE: HCPCS | Performed by: EMERGENCY MEDICINE

## 2025-04-17 PROCEDURE — 25510000002 GADOBENATE DIMEGLUMINE 529 MG/ML SOLUTION: Performed by: EMERGENCY MEDICINE

## 2025-04-17 PROCEDURE — 84439 ASSAY OF FREE THYROXINE: CPT | Performed by: NURSE PRACTITIONER

## 2025-04-17 PROCEDURE — 85730 THROMBOPLASTIN TIME PARTIAL: CPT | Performed by: STUDENT IN AN ORGANIZED HEALTH CARE EDUCATION/TRAINING PROGRAM

## 2025-04-17 PROCEDURE — 83735 ASSAY OF MAGNESIUM: CPT | Performed by: STUDENT IN AN ORGANIZED HEALTH CARE EDUCATION/TRAINING PROGRAM

## 2025-04-17 PROCEDURE — 85610 PROTHROMBIN TIME: CPT | Performed by: STUDENT IN AN ORGANIZED HEALTH CARE EDUCATION/TRAINING PROGRAM

## 2025-04-17 PROCEDURE — 84484 ASSAY OF TROPONIN QUANT: CPT | Performed by: STUDENT IN AN ORGANIZED HEALTH CARE EDUCATION/TRAINING PROGRAM

## 2025-04-17 PROCEDURE — 93010 ELECTROCARDIOGRAM REPORT: CPT | Performed by: INTERNAL MEDICINE

## 2025-04-17 PROCEDURE — 80053 COMPREHEN METABOLIC PANEL: CPT | Performed by: STUDENT IN AN ORGANIZED HEALTH CARE EDUCATION/TRAINING PROGRAM

## 2025-04-17 PROCEDURE — 85025 COMPLETE CBC W/AUTO DIFF WBC: CPT | Performed by: STUDENT IN AN ORGANIZED HEALTH CARE EDUCATION/TRAINING PROGRAM

## 2025-04-17 PROCEDURE — 82948 REAGENT STRIP/BLOOD GLUCOSE: CPT

## 2025-04-17 PROCEDURE — 70498 CT ANGIOGRAPHY NECK: CPT

## 2025-04-17 PROCEDURE — 70553 MRI BRAIN STEM W/O & W/DYE: CPT

## 2025-04-17 PROCEDURE — 96374 THER/PROPH/DIAG INJ IV PUSH: CPT

## 2025-04-17 RX ORDER — TERAZOSIN 2 MG/1
2 CAPSULE ORAL NIGHTLY
Status: DISCONTINUED | OUTPATIENT
Start: 2025-04-17 | End: 2025-04-18 | Stop reason: HOSPADM

## 2025-04-17 RX ORDER — SODIUM CHLORIDE 0.9 % (FLUSH) 0.9 %
10 SYRINGE (ML) INJECTION EVERY 12 HOURS SCHEDULED
Status: DISCONTINUED | OUTPATIENT
Start: 2025-04-17 | End: 2025-04-18 | Stop reason: HOSPADM

## 2025-04-17 RX ORDER — SODIUM CHLORIDE 9 MG/ML
40 INJECTION, SOLUTION INTRAVENOUS AS NEEDED
Status: DISCONTINUED | OUTPATIENT
Start: 2025-04-17 | End: 2025-04-18 | Stop reason: HOSPADM

## 2025-04-17 RX ORDER — SODIUM CHLORIDE 0.9 % (FLUSH) 0.9 %
10 SYRINGE (ML) INJECTION AS NEEDED
Status: DISCONTINUED | OUTPATIENT
Start: 2025-04-17 | End: 2025-04-18 | Stop reason: HOSPADM

## 2025-04-17 RX ORDER — LEVOTHYROXINE SODIUM 88 UG/1
88 TABLET ORAL DAILY
Status: DISCONTINUED | OUTPATIENT
Start: 2025-04-17 | End: 2025-04-18

## 2025-04-17 RX ORDER — INSULIN LISPRO 100 [IU]/ML
2-7 INJECTION, SOLUTION INTRAVENOUS; SUBCUTANEOUS
Status: DISCONTINUED | OUTPATIENT
Start: 2025-04-17 | End: 2025-04-18 | Stop reason: HOSPADM

## 2025-04-17 RX ORDER — AMLODIPINE BESYLATE 5 MG/1
10 TABLET ORAL DAILY
Status: DISCONTINUED | OUTPATIENT
Start: 2025-04-17 | End: 2025-04-18

## 2025-04-17 RX ORDER — NICOTINE POLACRILEX 4 MG
15 LOZENGE BUCCAL
Status: DISCONTINUED | OUTPATIENT
Start: 2025-04-17 | End: 2025-04-18 | Stop reason: HOSPADM

## 2025-04-17 RX ORDER — IBUPROFEN 600 MG/1
1 TABLET ORAL
Status: DISCONTINUED | OUTPATIENT
Start: 2025-04-17 | End: 2025-04-18 | Stop reason: HOSPADM

## 2025-04-17 RX ORDER — DEXTROSE MONOHYDRATE 25 G/50ML
25 INJECTION, SOLUTION INTRAVENOUS
Status: DISCONTINUED | OUTPATIENT
Start: 2025-04-17 | End: 2025-04-18 | Stop reason: HOSPADM

## 2025-04-17 RX ORDER — LABETALOL HYDROCHLORIDE 5 MG/ML
10 INJECTION, SOLUTION INTRAVENOUS ONCE
Status: COMPLETED | OUTPATIENT
Start: 2025-04-17 | End: 2025-04-17

## 2025-04-17 RX ORDER — METOPROLOL SUCCINATE 100 MG/1
200 TABLET, EXTENDED RELEASE ORAL DAILY
Status: DISCONTINUED | OUTPATIENT
Start: 2025-04-17 | End: 2025-04-18

## 2025-04-17 RX ORDER — FUROSEMIDE 20 MG/1
20 TABLET ORAL DAILY
Status: DISCONTINUED | OUTPATIENT
Start: 2025-04-17 | End: 2025-04-18 | Stop reason: HOSPADM

## 2025-04-17 RX ORDER — PANTOPRAZOLE SODIUM 40 MG/1
40 TABLET, DELAYED RELEASE ORAL
Status: DISCONTINUED | OUTPATIENT
Start: 2025-04-18 | End: 2025-04-18 | Stop reason: HOSPADM

## 2025-04-17 RX ORDER — LOSARTAN POTASSIUM 50 MG/1
100 TABLET ORAL
Status: DISCONTINUED | OUTPATIENT
Start: 2025-04-17 | End: 2025-04-18

## 2025-04-17 RX ORDER — ATORVASTATIN CALCIUM 80 MG/1
80 TABLET, FILM COATED ORAL NIGHTLY
Status: DISCONTINUED | OUTPATIENT
Start: 2025-04-17 | End: 2025-04-18 | Stop reason: HOSPADM

## 2025-04-17 RX ORDER — IOPAMIDOL 755 MG/ML
100 INJECTION, SOLUTION INTRAVASCULAR
Status: COMPLETED | OUTPATIENT
Start: 2025-04-17 | End: 2025-04-17

## 2025-04-17 RX ADMIN — Medication 10 MG: at 18:00

## 2025-04-17 RX ADMIN — IOPAMIDOL 95 ML: 755 INJECTION, SOLUTION INTRAVENOUS at 15:51

## 2025-04-17 RX ADMIN — ATORVASTATIN CALCIUM 80 MG: 80 TABLET, FILM COATED ORAL at 21:26

## 2025-04-17 RX ADMIN — Medication 10 ML: at 21:21

## 2025-04-17 RX ADMIN — LEVOTHYROXINE SODIUM 88 MCG: 88 TABLET ORAL at 21:26

## 2025-04-17 RX ADMIN — GADOBENATE DIMEGLUMINE 13 ML: 529 INJECTION, SOLUTION INTRAVENOUS at 20:54

## 2025-04-17 NOTE — ED NOTES
Patient to ER via car from home for headache with difficulty thinking and spot in eye  x 3 minutes    Patient reports everything has gotten better

## 2025-04-17 NOTE — H&P
[facility]   HISTORY AND PHYSICAL    Patient Name: Tiff Del Rio  : 1940  MRN: [id number]  Primary Care Physician:  Rodri Haney MD  Date of admission: 2025    Subjective   Subjective     Chief Complaint:   Chief Complaint   Patient presents with    Headache     HPI:    Tiff Del Rio is a 84 y.o. female with a past medical history significant for breast and thyroid cancer, prediabetes, TIA/CVA, hypertension, hyperlipidemia who presented to the emergency department with headache with associated vision change and word finding difficulty.  Symptoms had resolved by the time she came to the emergency department, she reports they lasted approximately 15 minutes.  Blood pressure was significantly elevated on arrival.  She reports she has not been taking her blood pressure medications.    High-sensitivity troponin was 8, 8.  proBNP was 277.  CBC and CMP largely unremarkable.  Chest x-ray negative for acute findings.  EKG shows sinus rhythm with without acute ischemic changes.  CTA head and neck pending final read, however no significant LVO.  A dural based enhancing mass lateral to the right frontal lobe measures increased in size compared to brain MRI from 2019 consistent with small meningioma.  Notified patient, she reports she was not aware of this finding.    She will be admitted to the ED observation for further management.  Will obtain MRI brain with and without contrast and consult neurology.    Review of Systems   All systems were reviewed and negative except for: Those mentioned in HPI    Personal History     Past Medical History:   Diagnosis Date    Arthritis     Breast cancer     Diabetes mellitus     Osteoporosis     Stroke     Hemorrhagic    Stroke, hemorrhagic 2004    Thyroid cancer     Thyroid disease     TIA (transient ischemic attack)        Past Surgical History:   Procedure Laterality Date    BREAST BIOPSY Right 10/2014    BREAST SURGERY  2014    Right mastectomy    EYE  SURGERY      Cataract 1995 and 2001    HYSTERECTOMY  1972    With oophorectomy    MASTECTOMY Right 10/2014    THYROIDECTOMY  2010       Family History: family history includes Cancer (age of onset: 43) in her maternal grandmother; Diabetes in her mother; Hypertension in her father and mother; No Known Problems in her maternal grandfather, paternal grandfather, and paternal grandmother; Stroke in her maternal grandmother and mother; Vision loss in her father. Otherwise pertinent FHx was reviewed and not pertinent to current issue.    Social History:  reports that she has never smoked. She has never been exposed to tobacco smoke. She has never used smokeless tobacco. She reports current alcohol use. She reports that she does not use drugs.    Home Medications:  alendronate, amLODIPine, atorvastatin, cephalexin, doxazosin, exemestane, furosemide, irbesartan, levothyroxine, metFORMIN, metoprolol succinate XL, olmesartan-hydrochlorothiazide, omeprazole, and vitamin D    Allergies:  Allergies   Allergen Reactions    Codeine Unknown (See Comments)     Other reaction(s): GI Intolerance    Codeine Phosphate GI Intolerance       Objective   Objective     Vitals:   Temp:  [97.6 °F (36.4 °C)] 97.6 °F (36.4 °C)  Heart Rate:  [57-89] 58  Resp:  [14-18] 18  BP: (211-232)/(100-125) 215/100  Physical Exam    Constitutional: Awake, alert   Eyes: PERRLA, sclerae anicteric, no conjunctival injection   HENT: NCAT, mucous membranes moist   Neck: Supple, no thyromegaly, no lymphadenopathy, trachea midline   Respiratory: Clear to auscultation bilaterally, nonlabored respirations    Cardiovascular: RRR, no murmurs, rubs, or gallops, palpable pedal pulses bilaterally   Gastrointestinal: Positive bowel sounds, soft, nontender, nondistended   Musculoskeletal: No bilateral ankle edema, no clubbing or cyanosis to extremities   Psychiatric: Appropriate affect, cooperative   Neurologic: Oriented x 3, strength symmetric in all extremities, Cranial  Nerves grossly intact to confrontation, speech clear   Skin: No rashes     Result Review    Result Review:  I have personally reviewed the results from the time of this admission to 4/17/2025 17:58 EDT and agree with these findings:  [x]  Laboratory list / accordion  []  Microbiology  [x]  Radiology  [x]  EKG/Telemetry   []  Cardiology/Vascular   []  Pathology  []  Old records  []  Other:  Most notable findings include: Meningioma noted on CTA    Assessment & Plan   The ASCVD Risk score (Russell TYSON, et al., 2019) failed to calculate for the following reasons:    The 2019 ASCVD risk score is only valid for ages 40 to 79    Risk score cannot be calculated because patient has a medical history suggesting prior/existing ASCVD    Assessment / Plan     Brief Patient Summary:  Tiff Del Rio is a 84 y.o. female who will be admitted to the observation unit for further management due to headache with associated abnormal vision and word finding difficulty.  Symptoms have resolved.  NIH currently 0.  We will obtain an MRI with and without contrast due to known meningioma/history of cancer.  Consult neurology.    Active Hospital Problems:  Active Hospital Problems    Diagnosis     **Stroke-like symptoms      Plan:     Headache/abnormal vision/word finding difficulty  - Telemetry monitoring  - Vital signs and neurochecks per nurse routine  - CTA head and neck negative for LVO, meningioma noted that is increased in size compared to imaging from 2019  - MRI brain with and without contrast  - Permissive hypertension for now  - Check A1c, lipid panel  - Neurology consult    Hypertension  - Reports she has not been taking her medications  - Resume previous regimen once cleared by neurology    Hyperlipidemia  - Continue statin    History of thyroid cancer-S/P thyroidectomy  - Continue levothyroxine  - Check TSH    VTE Prophylaxis:  Mechanical VTE prophylaxis orders are present.    CODE STATUS:    Code Status (Patient has no pulse  and is not breathing): CPR (Attempt to Resuscitate)  Medical Interventions (Patient has pulse or is breathing): Full Support  Level Of Support Discussed With: Patient    Admission Status:  I believe this patient meets observation status.    Electronically signed by BROOKLYN Woody, 04/17/25, 5:58 PM EDT.    75 minutes has been spent by [facility] providers in the care of this patient while under observation status on this date 04/17/25    I have worn appropriate PPE during this patient encounter, sanitized my hands both with entering and exiting patient's room.    I have discussed plan of care with patient including advance care plan and/or surrogate decision maker.  Patient advises that their  will be their primary surrogate decision maker

## 2025-04-17 NOTE — PROGRESS NOTES
YENI HANNA Attestation Note     I supervised care provided by the midlevel provider. We have discussed this patient's history, physical exam, and treatment plan. I have reviewed the midlevel provider's note and I agree with the midlevel provider's findings and plan of care.   SHARED VISIT: This visit was performed by BOTH a physician and an APC. The substantive portion of the medical decision making was performed by this attesting physician who made or approved the management plan and takes responsibility for patient management. All studies in the APC note (if performed) were independently interpreted by me.   I have personally had a face to face encounter with the patient.   My personal findings are documented below:      Subjective  Pt is a 84 y.o. female admitted from Emergency Department for evaluation and treatment of episode of visual changes and trouble with her speech.  This was associated with some headache.  She was found to be quite hypertensive upon ED arrival.  She does admit to not taking several of her medicines over the last several days.  Currently symptoms are back at baseline and she feels her usual self.    Physical Exam  GENERAL: Alert and in NAD, Vitals reviewed-blood pressure 198/92, pulse 62.  Temperature and O2 sats are normal  HENT: nares patent  EYES: no scleral icterus  CV: regular rhythm, regular rate-no murmur  RESPIRATORY: normal effort, clear to auscultation bilaterally  ABDOMEN: soft  MUSCULOSKELETAL: no deformity  NEURO: Strength sensation and coordination are grossly intact.  Speech and mentation are unremarkable  SKIN: warm, dry    Assessment/Plan  I discussed tx and evaluation of this patient with BROOKLYN Silverman.  CT angiogram of the head and neck did not show any significant vascular occlusions.  There was noted meningioma which has been seen in the past.  Symptoms are currently resolved.  Would consider hypertensive urgency, migraine, TIA or stroke.  Will obtain MRI of brain  with and without contrast, echocardiogram and neurology consultation.

## 2025-04-17 NOTE — ED NOTES
Nursing report ED to floor  Tiff Del Rio  84 y.o.  female    HPI :  HPI  Stated Reason for Visit: headache    Chief Complaint  Chief Complaint   Patient presents with    Headache       Admitting doctor:   Rodri Priest MD    Admitting diagnosis:   The encounter diagnosis was TIA (transient ischemic attack).    Code status:   Current Code Status       Date Active Code Status Order ID Comments User Context       4/17/2025 1722 CPR (Attempt to Resuscitate) [id number]  Sandee Silverman APRN ED        Question Answer    Code Status (Patient has no pulse and is not breathing) CPR (Attempt to Resuscitate)    Medical Interventions (Patient has pulse or is breathing) Full Support    Level Of Support Discussed With Patient                    Allergies:   Codeine and Codeine phosphate    Isolation:   No active isolations    Intake and Output  No intake or output data in the 24 hours ending 04/17/25 1740    Weight:   There were no vitals filed for this visit.    Most recent vitals:   Vitals:    04/17/25 1421 04/17/25 1437 04/17/25 1651 04/17/25 1732   BP:  (!) 224/106 (!) 211/101 (!) 215/100   Pulse: 73  57 58   Resp: 16  18    Temp: 97.6 °F (36.4 °C)      SpO2: 97%  96% 100%       Active LDAs/IV Access:   Lines, Drains & Airways       Active LDAs       Name Placement date Placement time Site Days    Peripheral IV 04/17/25 1436 20 G Left Antecubital 04/17/25  1436  Antecubital  less than 1                    Labs (abnormal labs have a star):   Labs Reviewed   PROTIME-INR - Abnormal; Notable for the following components:       Result Value    Protime 14.5 (*)     INR 1.14 (*)     All other components within normal limits   TROPONIN - Normal    Narrative:     High Sensitive Troponin T Reference Range:  <14.0 ng/L- Negative Female for AMI  <22.0 ng/L- Negative Male for AMI  >=14 - Abnormal Female indicating possible myocardial injury.  >=22 - Abnormal Male indicating possible myocardial injury.   Clinicians would have to  utilize clinical acumen, EKG, Troponin, and serial changes to determine if it is an Acute Myocardial Infarction or myocardial injury due to an underlying chronic condition.        MAGNESIUM - Normal   APTT - Normal   CBC WITH AUTO DIFFERENTIAL - Normal   BNP (IN-HOUSE) - Normal    Narrative:     This assay is used as an aid in the diagnosis of individuals suspected of having heart failure. It can be used as an aid in the diagnosis of acute decompensated heart failure (ADHF) in patients presenting with signs and symptoms of ADHF to the emergency department (ED). In addition, NT-proBNP of <300 pg/mL indicates ADHF is not likely.    Age Range Result Interpretation  NT-proBNP Concentration (pg/mL:      <50             Positive            >450                   Gray                 300-450                    Negative             <300    50-75           Positive            >900                  Gray                300-900                  Negative            <300      >75             Positive            >1800                  Gray                300-1800                  Negative            <300   HIGH SENSITIVITIY TROPONIN T 1HR - Normal    Narrative:     High Sensitive Troponin T Reference Range:  <14.0 ng/L- Negative Female for AMI  <22.0 ng/L- Negative Male for AMI  >=14 - Abnormal Female indicating possible myocardial injury.  >=22 - Abnormal Male indicating possible myocardial injury.   Clinicians would have to utilize clinical acumen, EKG, Troponin, and serial changes to determine if it is an Acute Myocardial Infarction or myocardial injury due to an underlying chronic condition.        POCT GLUCOSE FINGERSTICK - Normal   COMPREHENSIVE METABOLIC PANEL    Narrative:     GFR Categories in Chronic Kidney Disease (CKD)      GFR Category          GFR (mL/min/1.73)    Interpretation  G1                     90 or greater         Normal or high (1)  G2                      60-89                Mild decrease (1)  G3a                    45-59                Mild to moderate decrease  G3b                   30-44                Moderate to severe decrease  G4                    15-29                Severe decrease  G5                    14 or less           Kidney failure          (1)In the absence of evidence of kidney disease, neither GFR category G1 or G2 fulfill the criteria for CKD.    eGFR calculation 2021 CKD-EPI creatinine equation, which does not include race as a factor   POCT GLUCOSE FINGERSTICK   POCT GLUCOSE FINGERSTICK   POCT GLUCOSE FINGERSTICK   POCT GLUCOSE FINGERSTICK   CBC AND DIFFERENTIAL    Narrative:     The following orders were created for panel order CBC & Differential.  Procedure                               Abnormality         Status                     ---------                               -----------         ------                     CBC Auto Differential[[id number]]        Normal              Final result                 Please view results for these tests on the individual orders.       EKG:   ECG 12 Lead Stroke Evaluation   Preliminary Result   HEART RATE=66  bpm   RR Xkvggngw=100  ms   VA Qtkbnskp=473  ms   P Horizontal Axis=-17  deg   P Front Axis=33  deg   QRSD Interval=94  ms   QT Txpvdtvm=173  ms   PMaW=840  ms   QRS Axis=-30  deg   T Wave Axis=35  deg   - ABNORMAL ECG -   Sinus rhythm   Left ventricular hypertrophy   Inferior infarct, old   Anterior Q waves, possibly due to LVH   Date and Time of Study:2025-04-17 14:57:51          Meds given in ED:   Medications   sodium chloride 0.9 % flush 10 mL (has no administration in time range)   sodium chloride 0.9 % flush 10 mL (has no administration in time range)   sodium chloride 0.9 % infusion 40 mL (has no administration in time range)   iopamidol (ISOVUE-370) 76 % injection 100 mL (95 mL Intravenous Given 4/17/25 2241)       Imaging results:  CT Angiogram Head  Result Date: 4/17/2025  1. No evidence for acute intracranial abnormality. Further  evaluation can be performed with brain MRI if indicated. 2. Atherosclerotic calcified plaques are present involving the proximal ICAs with 15% stenoses bilaterally internal carotid arteries. No large vessel occlusion. Incidental note of medial deviation of the right internal carotid artery to the midline into the prevertebral space. 3. Cerebral atrophy with moderate chronic small vessel ischemic white matter change. 4. Dural based enhancing mass lateral to the right frontal lobe measures increased in size compared to the brain MRI 05/12/2019 and is consistent with a small meningioma.      CT Angiogram Neck  Result Date: 4/17/2025  1. No evidence for acute intracranial abnormality. Further evaluation can be performed with brain MRI if indicated. 2. Atherosclerotic calcified plaques are present involving the proximal ICAs with 15% stenoses bilaterally internal carotid arteries. No large vessel occlusion. Incidental note of medial deviation of the right internal carotid artery to the midline into the prevertebral space. 3. Cerebral atrophy with moderate chronic small vessel ischemic white matter change. 4. Dural based enhancing mass lateral to the right frontal lobe measures increased in size compared to the brain MRI 05/12/2019 and is consistent with a small meningioma.      XR Chest 1 View  Result Date: 4/17/2025  1. No acute process.   This report was finalized on 4/17/2025 3:21 PM by Dr. Peyman De León M.D on Workstation: [id number]        Ambulatory status:   - standby    Social issues:   Social History     Socioeconomic History    Marital status:    Tobacco Use    Smoking status: Never     Passive exposure: Never    Smokeless tobacco: Never   Vaping Use    Vaping status: Never Used   Substance and Sexual Activity    Alcohol use: Yes     Types: 1 Glasses of wine per week     Comment: Occasional    Drug use: No    Sexual activity: Defer       Peripheral Neurovascular  Peripheral Neurovascular  (Adult)  Peripheral Neurovascular WDL: WDL    Neuro Cognitive  Neuro Cognitive (Adult)  Cognitive/Neuro/Behavioral WDL: WDL  Additional Documentation: NIH Stroke Scale (group)  NIH Stroke Scale  Interval: baseline  1a. Level of Consciousness: 0-->Alert, keenly responsive  1b. LOC Questions: 0-->Answers both questions correctly  1c. LOC Commands: 0-->Performs both tasks correctly  2. Best Gaze: 0-->Normal  3. Visual: 0-->No visual loss  4. Facial Palsy: 0-->Normal symmetrical movements  5a. Motor Arm, Left: 0-->No drift, limb holds 90 (or 45) degrees for full 10 secs  5b. Motor Arm, Right: 0-->No drift, limb holds 90 (or 45) degrees for full 10 secs  6a. Motor Leg, Left: 0-->No drift, leg holds 30 degree position for full 5 secs  6b. Motor Leg, Right: 0-->No drift, leg holds 30 degree position for full 5 secs  7. Limb Ataxia: 0-->Absent  8. Sensory: 0-->Normal, no sensory loss  9. Best Language: 0-->No aphasia, normal  10. Dysarthria: 0-->Normal  11. Extinction and Inattention (formerly Neglect): 0-->No abnormality  Total (NIH Stroke Scale): 0    Learning  Learning Assessment  Learning Readiness and Ability: no barriers identified    Respiratory  Respiratory  Airway WDL: WDL  Respiratory WDL  Respiratory WDL: WDL    Abdominal Pain       Pain Assessments  Pain (Adult)  (0-10) Pain Rating: Rest: 0    NIH Stroke Scale  NIH Stroke Scale  Interval: baseline  1a. Level of Consciousness: 0-->Alert, keenly responsive  1b. LOC Questions: 0-->Answers both questions correctly  1c. LOC Commands: 0-->Performs both tasks correctly  2. Best Gaze: 0-->Normal  3. Visual: 0-->No visual loss  4. Facial Palsy: 0-->Normal symmetrical movements  5a. Motor Arm, Left: 0-->No drift, limb holds 90 (or 45) degrees for full 10 secs  5b. Motor Arm, Right: 0-->No drift, limb holds 90 (or 45) degrees for full 10 secs  6a. Motor Leg, Left: 0-->No drift, leg holds 30 degree position for full 5 secs  6b. Motor Leg, Right: 0-->No drift, leg holds 30  degree position for full 5 secs  7. Limb Ataxia: 0-->Absent  8. Sensory: 0-->Normal, no sensory loss  9. Best Language: 0-->No aphasia, normal  10. Dysarthria: 0-->Normal  11. Extinction and Inattention (formerly Neglect): 0-->No abnormality  Total (NIH Stroke Scale): 0    Sandor Harrell RN  04/17/25 17:40 EDT

## 2025-04-17 NOTE — PROGRESS NOTES
Clinical Pharmacy Services: Medication History    Tiff Del Rio is a 84 y.o. female presenting to [facility] for   Chief Complaint   Patient presents with    Headache       She  has a past medical history of Arthritis, Breast cancer, Diabetes mellitus, Osteoporosis, Stroke, Stroke, hemorrhagic (2004), Thyroid cancer, Thyroid disease, and TIA (transient ischemic attack).    Allergies as of 04/17/2025 - Reviewed 04/17/2025   Allergen Reaction Noted    Codeine Unknown (See Comments) 03/22/2013    Codeine phosphate GI Intolerance 02/24/2016       Med[facility]: [facility]   Pharmacy and Phone Number:     Prior to Admission Medications       Prescriptions Last Dose Informant Patient Reported? Taking?    alendronate (FOSAMAX) 70 MG tablet Unknown Pharmacy Yes No    Take 1 tablet by mouth Every 7 (Seven) Days.    amLODIPine (NORVASC) 10 MG tablet Unknown Pharmacy Yes No    Take 1 tablet by mouth Daily.    atorvastatin (LIPITOR) 80 MG tablet Unknown Pharmacy No No    Take 1 tablet by mouth Every Night.    cyanocobalamin injection 1,000 mcg   No No    cyanocobalamin injection 1,000 mcg   No No    doxazosin (CARDURA) 2 MG tablet Unknown Pharmacy Yes No    Take 1 tablet by mouth Every Night.    exemestane (AROMASIN) 25 MG chemo tablet Unknown Pharmacy No No    Take 1 tablet by mouth Daily.    furosemide (LASIX) 20 MG tablet Unknown Pharmacy No No    Take 1 tablet by mouth Daily.    irbesartan (AVAPRO) 300 MG tablet Unknown Pharmacy Yes No    Take 1 tablet by mouth every night at bedtime.    levothyroxine (SYNTHROID) 88 MCG tablet Unknown Pharmacy No No    Take 1 tablet by mouth Daily.    metFORMIN (GLUCOPHAGE) 500 MG tablet Unknown Pharmacy No No    Take 1 tablet by mouth Daily With Breakfast.    metoprolol succinate XL (TOPROL-XL) 200 MG 24 hr tablet Unknown Pharmacy No No    Take 1 tablet by mouth Daily.    omeprazole (priLOSEC) 40 MG capsule Unknown Pharmacy Yes No    Take 1  capsule by mouth Daily.    vitamin D (ERGOCALCIFEROL) 1.25 MG (51582 UT) capsule capsule Unknown Pharmacy Yes No    Take 1 capsule by mouth Every 7 (Seven) Days.              Medication notes:     This medication list is complete to the best of my knowledge as of 4/17/2025    Please call if questions.    Shabnam Montes  Medication History Technician   [phone]    4/17/2025 18:03 EDT

## 2025-04-17 NOTE — ED PROVIDER NOTES
EMERGENCY DEPARTMENT ENCOUNTER  Room Number:  23/23  PCP: Rodri Haney MD  Independent Historians: Patient and Family      HPI:  Chief Complaint: had concerns including Headache.     Context: Tiff Del Rio is a 84 y.o. female with a medical history of HTN, breast cancer, CVA, diabetes who presents to the ED c/o acute vision changes and difficulty with speech.  Patient reports an episode earlier today acute vision changes.  She reports an elongated donut-shaped in her vision where the outer ring was black but she could see through the center of it.  She states this was present and both eyes and did not matter if she closed her eye or not.  This lasted approximately 15 minutes and then self resolved.  Patient reports after that she had a period of confusion where she knew what she wanted to say but she could not get the words out.  This also self resolved prior to arrival.  Patient states she she has a history of CVA and is on multiple medications at home but had not been taking them for several days.  Patient is currently asymptomatic.      Review of prior external notes (non-ED) -and- Review of prior external test results outside of this encounter: Office visit with primary care from 1/30/2025 reviewed and notable for visit secondary to resistant hypertension, diabetes and leg swelling.  Patient had controlled blood pressure at that time and noted to have an A1c of 5.8.  Leg swelling had improved after switching from HCTZ to Lasix.    Prescription drug monitoring program review:         PAST MEDICAL HISTORY  Active Ambulatory Problems     Diagnosis Date Noted    Essential hypertension 04/04/2016    Malignant neoplasm of upper-outer quadrant of right breast in female, estrogen receptor positive 05/23/2016    Osteoporosis 05/23/2016    Contusion of knee and lower leg, left, subsequent encounter 05/21/2018    TIA (transient ischemic attack) 05/12/2019    Hyperthyroidism- on suppressive therapy 05/13/2019     B12 deficiency anemia 05/13/2019    Type 2 diabetes mellitus with neurologic complication 05/13/2019     Resolved Ambulatory Problems     Diagnosis Date Noted    Hypothyroidism (acquired) 04/07/2017     Past Medical History:   Diagnosis Date    Arthritis     Breast cancer     Diabetes mellitus     Stroke     Stroke, hemorrhagic 2004    Thyroid cancer     Thyroid disease          PAST SURGICAL HISTORY  Past Surgical History:   Procedure Laterality Date    BREAST BIOPSY Right 10/2014    BREAST SURGERY  2014    Right mastectomy    EYE SURGERY      Cataract 1995 and 2001    HYSTERECTOMY  1972    With oophorectomy    MASTECTOMY Right 10/2014    THYROIDECTOMY  2010         FAMILY HISTORY  Family History   Problem Relation Age of Onset    Hypertension Mother     Stroke Mother     Diabetes Mother     Hypertension Father     Vision loss Father     Cancer Maternal Grandmother 43        Uteran cancer    Stroke Maternal Grandmother     No Known Problems Maternal Grandfather     No Known Problems Paternal Grandmother     No Known Problems Paternal Grandfather          SOCIAL HISTORY  Social History     Socioeconomic History    Marital status:    Tobacco Use    Smoking status: Never     Passive exposure: Never    Smokeless tobacco: Never   Vaping Use    Vaping status: Never Used   Substance and Sexual Activity    Alcohol use: Yes     Types: 1 Glasses of wine per week     Comment: Occasional    Drug use: No    Sexual activity: Defer         ALLERGIES  Codeine and Codeine phosphate      REVIEW OF SYSTEMS  Review of Systems  Included in HPI  All systems reviewed and negative except for those discussed in HPI.      PHYSICAL EXAM    I have reviewed the triage vital signs and nursing notes.    ED Triage Vitals   Temp Heart Rate Resp BP SpO2   04/17/25 1421 04/17/25 1421 04/17/25 1421 04/17/25 1437 04/17/25 1421   97.6 °F (36.4 °C) 73 16 (!) 224/106 97 %      Temp src Heart Rate Source Patient Position BP Location FiO2 (%)   --  -- -- -- --              Physical Exam  GENERAL: alert, no acute distress  SKIN: Warm, dry  HENT: Normocephalic, atraumatic  EYES: no scleral icterus  CV: regular rhythm, regular rate  RESPIRATORY: normal effort, lungs clear  ABDOMEN: soft, nontender, nondistended  MUSCULOSKELETAL: no deformity  NEURO: alert, moves all extremities, follows commands.  No focal neurological deficits, specifically no motor or sensory changes, no aphasia, no facial droop, no ataxia            LAB RESULTS  Recent Results (from the past 24 hours)   POC Glucose Once    Collection Time: 04/17/25  2:28 PM    Specimen: Blood   Result Value Ref Range    Glucose 90 70 - 130 mg/dL   Comprehensive Metabolic Panel    Collection Time: 04/17/25  2:37 PM    Specimen: Blood   Result Value Ref Range    Glucose 93 65 - 99 mg/dL    BUN 14 8 - 23 mg/dL    Creatinine 0.83 0.57 - 1.00 mg/dL    Sodium 142 136 - 145 mmol/L    Potassium 3.6 3.5 - 5.2 mmol/L    Chloride 105 98 - 107 mmol/L    CO2 26.3 22.0 - 29.0 mmol/L    Calcium 10.0 8.6 - 10.5 mg/dL    Total Protein 6.9 6.0 - 8.5 g/dL    Albumin 4.1 3.5 - 5.2 g/dL    ALT (SGPT) 7 1 - 33 U/L    AST (SGOT) 16 1 - 32 U/L    Alkaline Phosphatase 86 39 - 117 U/L    Total Bilirubin 0.6 0.0 - 1.2 mg/dL    Globulin 2.8 gm/dL    A/G Ratio 1.5 g/dL    BUN/Creatinine Ratio 16.9 7.0 - 25.0    Anion Gap 10.7 5.0 - 15.0 mmol/L    eGFR 68.8 >60.0 mL/min/1.73   High Sensitivity Troponin T    Collection Time: 04/17/25  2:37 PM    Specimen: Blood   Result Value Ref Range    HS Troponin T 8 <14 ng/L   Magnesium    Collection Time: 04/17/25  2:37 PM    Specimen: Blood   Result Value Ref Range    Magnesium 1.9 1.6 - 2.4 mg/dL   aPTT    Collection Time: 04/17/25  2:37 PM    Specimen: Blood   Result Value Ref Range    PTT 31.5 22.7 - 35.4 seconds   Protime-INR    Collection Time: 04/17/25  2:37 PM    Specimen: Blood   Result Value Ref Range    Protime 14.5 (H) 11.7 - 14.2 Seconds    INR 1.14 (H) 0.90 - 1.10   CBC Auto Differential     Collection Time: 04/17/25  2:37 PM    Specimen: Blood   Result Value Ref Range    WBC 8.13 3.40 - 10.80 10*3/mm3    RBC 4.59 3.77 - 5.28 10*6/mm3    Hemoglobin 13.5 12.0 - 15.9 g/dL    Hematocrit 40.2 34.0 - 46.6 %    MCV 87.6 79.0 - 97.0 fL    MCH 29.4 26.6 - 33.0 pg    MCHC 33.6 31.5 - 35.7 g/dL    RDW 12.6 12.3 - 15.4 %    RDW-SD 40.5 37.0 - 54.0 fl    MPV 10.4 6.0 - 12.0 fL    Platelets 197 140 - 450 10*3/mm3    Neutrophil % 55.8 42.7 - 76.0 %    Lymphocyte % 32.7 19.6 - 45.3 %    Monocyte % 9.0 5.0 - 12.0 %    Eosinophil % 1.5 0.3 - 6.2 %    Basophil % 0.6 0.0 - 1.5 %    Immature Grans % 0.4 0.0 - 0.5 %    Neutrophils, Absolute 4.54 1.70 - 7.00 10*3/mm3    Lymphocytes, Absolute 2.66 0.70 - 3.10 10*3/mm3    Monocytes, Absolute 0.73 0.10 - 0.90 10*3/mm3    Eosinophils, Absolute 0.12 0.00 - 0.40 10*3/mm3    Basophils, Absolute 0.05 0.00 - 0.20 10*3/mm3    Immature Grans, Absolute 0.03 0.00 - 0.05 10*3/mm3    nRBC 0.0 0.0 - 0.2 /100 WBC   BNP    Collection Time: 04/17/25  2:37 PM    Specimen: Blood   Result Value Ref Range    proBNP 277.0 0.0 - 1,800.0 pg/mL   ECG 12 Lead Stroke Evaluation    Collection Time: 04/17/25  2:57 PM   Result Value Ref Range    QT Interval 411 ms    QTC Interval 430 ms   High Sensitivity Troponin T 1Hr    Collection Time: 04/17/25  3:37 PM    Specimen: Blood   Result Value Ref Range    HS Troponin T 8 <14 ng/L    Troponin T Numeric Delta 0 Abnormal if >/=3 ng/L         RADIOLOGY  CT Angiogram Head  CT Angiogram Head, CT Angiogram Neck  Result Date: 4/17/2025  CT ANGIOGRAM HEAD NECK WITH IV CONTRAST  HISTORY: Hypertension, breast cancer. Strokelike symptoms.  TECHNIQUE: CT head without contrast followed by CT angiogram head and neck with IV contrast in the arterial phase and data reconstructed in coronal and sagittal planes and three-dimensional volume rendering was performed. Post contrast-enhanced head CT was obtained.  COMPARISON: MR angiogram of the head and neck 05/12/2019.   FINDINGS: Cerebral atrophy with prominence of the sulci. There are no abnormal areas of increased attenuation intra-axially to suggest hemorrhage. There is moderate chronic small vessel ischemic white matter change. No evidence for mass effect or shift of the midline structures.  Great vessel origins are patent. There are calcified plaques involving the carotid bulbs and proximal internal carotid arteries. Bilateral 15% stenoses are present of the proximal ICAs by NASCET criteria. Incidental note of medial deviation of right internal carotid artery to the midline in the prevertebral space. Cervical, petrous, cavernous, supracavernous internal carotid arteries are patent. Calcified plaques are present involving the cavernous segments of both internal carotid arteries with mild narrowing. Both middle cerebral arteries and anterior cerebral arteries are patent.  Proximal left vertebral artery is tortuous though is patent. Left vertebral artery is larger than the right. Right vertebral artery is patent. The basilar artery, both posterior cerebral arteries are patent. There is a fetal origin of the left posterior cerebral artery.  Post contrast enhanced head CT enhancing mass anterolateral to the right frontal lobe measuring 10 x 6 x 14 mm. This is increased in size compared to MRI brain 05/12/2019 and is favored to represent a meningioma.      1. No evidence for acute intracranial abnormality. Further evaluation can be performed with brain MRI if indicated. 2. Atherosclerotic calcified plaques are present involving the proximal ICAs with 15% stenoses bilaterally internal carotid arteries. No large vessel occlusion. Incidental note of medial deviation of the right internal carotid artery to the midline into the prevertebral space. 3. Cerebral atrophy with moderate chronic small vessel ischemic white matter change. 4. Dural based enhancing mass lateral to the right frontal lobe measures increased in size compared to the  brain MRI 05/12/2019 and is consistent with a small meningioma.      XR Chest 1 View  Result Date: 4/17/2025  XR CHEST 1 VW-4/17/2025  HISTORY: Stroke. Follow-up.  Heart size is within normal limits. Lungs appear free of acute infiltrates. Bones and soft tissues are unremarkable.      1. No acute process.   This report was finalized on 4/17/2025 3:21 PM by Dr. Peyman De León M.D on Workstation: [id number]          MEDICATIONS GIVEN IN ER  Medications   sodium chloride 0.9 % flush 10 mL (has no administration in time range)   sodium chloride 0.9 % flush 10 mL (has no administration in time range)   sodium chloride 0.9 % infusion 40 mL (has no administration in time range)   iopamidol (ISOVUE-370) 76 % injection 100 mL (95 mL Intravenous Given 4/17/25 1551)         ORDERS PLACED DURING THIS VISIT:  Orders Placed This Encounter   Procedures    XR Chest 1 View    CT Angiogram Head    CT Angiogram Neck    MRI Brain With & Without Contrast    Comprehensive Metabolic Panel    High Sensitivity Troponin T    Magnesium    aPTT    Protime-INR    CBC Auto Differential    BNP    High Sensitivity Troponin T 1Hr    Hemoglobin A1c    Lipid Panel    NPO Diet NPO Type: Strict NPO    Vital Signs    Continuous Pulse Oximetry    Telemetry - Place Orders & Notify Provider of Results When Patient Experiences Acute Chest Pain, Dysrhythmia or Respiratory Distress    Notify Provider    Nursing Dysphagia Screening (Complete Prior to Giving Anything By Mouth)    RN to Place Order SLP Consult - Eval & Treat Choosing Reason of RN Dysphagia Screen Failed    Nurse to Call MD or Nutrition Services for Diet if Patient Passes Dysphagia Screen    Intake and Output    Neuro Checks    NIHSS Assessment    Order CT Head Without Contrast for Neurological Decline    Provide Stroke Education Material    Saline Lock & Maintain IV Access    Activity As Tolerated    Code Status and Medical Interventions: CPR (Attempt to Resuscitate); Full Support    Notify  Stroke Coordinator    Inpatient Case Management  Consult    Inpatient Diabetes Educator Consult    Inpatient Neurology Consult Stroke    OT Consult: Eval & Treat    PT Consult: Eval & Treat As Tolerated    SLP Consult: Eval & Treat Communication Disorder    POC Glucose Once    POC Glucose Q6H    ECG 12 Lead Stroke Evaluation    Insert Peripheral IV    Initiate Emergency Department Observation Status    CBC & Differential         OUTPATIENT MEDICATION MANAGEMENT:  Current Facility-Administered Medications Ordered in Epic   Medication Dose Route Frequency Provider Last Rate Last Admin    cyanocobalamin injection 1,000 mcg  1,000 mcg Intramuscular Q28 Days Cricket, Betsey Edith, APRN   1,000 mcg at 05/28/19 1609    cyanocobalamin injection 1,000 mcg  1,000 mcg Intramuscular Q28 Days Cricket, Betsey Edith, APRN   1,000 mcg at 06/03/19 1403    sodium chloride 0.9 % flush 10 mL  10 mL Intravenous Q12H Blevens, Sandee C, APRN        sodium chloride 0.9 % flush 10 mL  10 mL Intravenous PRN Blevens, Sandee C, APRN        sodium chloride 0.9 % infusion 40 mL  40 mL Intravenous PRN Blevens, Sandee C, APRN         Current Outpatient Medications Ordered in Epic   Medication Sig Dispense Refill    alendronate (FOSAMAX) 70 MG tablet Take 1 tablet by mouth Every 7 (Seven) Days.      amLODIPine (NORVASC) 10 MG tablet Take 1 tablet by mouth Daily.      atorvastatin (LIPITOR) 80 MG tablet Take 1 tablet by mouth Every Night. 30 tablet 0    cephalexin (KEFLEX) 500 MG capsule Take 1 capsule by mouth 2 (Two) Times a Day. 14 capsule 0    doxazosin (CARDURA) 2 MG tablet Take 1 tablet by mouth Every Night.      exemestane (AROMASIN) 25 MG chemo tablet Take 1 tablet by mouth Daily. 90 tablet 0    furosemide (LASIX) 20 MG tablet Take 1 tablet by mouth Daily. 90 tablet 1    irbesartan (AVAPRO) 300 MG tablet Take 1 tablet by mouth every night at bedtime.      levothyroxine (SYNTHROID) 88 MCG tablet Take 1 tablet by mouth Daily. 30  tablet 0    metFORMIN (GLUCOPHAGE) 500 MG tablet Take 1 tablet by mouth Daily With Breakfast. 90 tablet 0    metoprolol succinate XL (TOPROL-XL) 200 MG 24 hr tablet Take 1 tablet by mouth Daily. 90 tablet 0    olmesartan-hydrochlorothiazide (BENICAR HCT) 40-12.5 MG per tablet Take 1 tablet by mouth Daily. 90 tablet 0    omeprazole (priLOSEC) 40 MG capsule       vitamin D (ERGOCALCIFEROL) 1.25 MG (59471 UT) capsule capsule Take 1 capsule by mouth.           PROCEDURES  Procedures            PROGRESS, DATA ANALYSIS, CONSULTS, AND MEDICAL DECISION MAKING  All labs have been independently interpreted by me.  All radiology studies have been reviewed by me. All EKG's have been independently viewed and interpreted by me.  Discussion below represents my analysis of pertinent findings related to patient's condition, differential diagnosis, treatment plan and final disposition.    Differential diagnosis includes but is not limited to TIA, encephalopathy, hypertensive emergency, CVA.    Clinical Scores:                       Total (NIH Stroke Scale): 0               ED Course as of 04/17/25 1723   Thu Apr 17, 2025   1521 EKG interpreted by me demonstrates sinus rhythm, rate of 66, no appears to QT prolongation, no ST elevation [MW]   1614 Chest x-ray interpreted by me and demonstrates no evidence of consolidation [MW]   1716 Workup in the emergency department is overall unremarkable with no significant laboratory abnormalities.  CT imaging of the brain demonstrates no acute findings.  There is a small meningioma that looks like it is increased in size but I believe that is irrelevant to patient's presentation.  Patient remains asymptomatic while in the ER.  Will plan on admission for further evaluation and management. [MW]   1717 Discussed with Sandee TORRE with [facility] who agrees to admit [MW]      ED Course User Index  [MW] Filippo Rodriguez MD             AS OF 17:23 EDT VITALS:    BP - (!) 211/101  HR - 57  TEMP - 97.6 °F (36.4  °C)  O2 SATS - 96%    COMPLEXITY OF CARE  The patient requires admission.      DIAGNOSIS  Final diagnoses:   TIA (transient ischemic attack)         DISPOSITION  ED Disposition       ED Disposition   Decision to Admit    Condition   --    Comment   --                Please note that portions of this document were completed with a voice recognition program.    Note Disclaimer: At [facility], we believe that sharing information builds trust and better relationships. You are receiving this note because you recently visited [facility]. It is possible you will see health information before a provider has talked with you about it. This kind of information can be easy to misunderstand. To help you fully understand what it means for your health, we urge you to discuss this note with your provider.         Filippo Rodriguez MD  04/17/25 [id number]

## 2025-04-18 ENCOUNTER — APPOINTMENT (OUTPATIENT)
Dept: CARDIOLOGY | Facility: HOSPITAL | Age: 85
End: 2025-04-18
Payer: MEDICARE

## 2025-04-18 VITALS
OXYGEN SATURATION: 95 % | BODY MASS INDEX: 24.75 KG/M2 | RESPIRATION RATE: 18 BRPM | WEIGHT: 145 LBS | TEMPERATURE: 97.9 F | DIASTOLIC BLOOD PRESSURE: 65 MMHG | HEART RATE: 57 BPM | SYSTOLIC BLOOD PRESSURE: 168 MMHG | HEIGHT: 64 IN

## 2025-04-18 LAB
ANION GAP SERPL CALCULATED.3IONS-SCNC: 11.1 MMOL/L (ref 5–15)
AORTIC DIMENSIONLESS INDEX: 0.79 (DI)
ASCENDING AORTA: 3.2 CM
AV MEAN PRESS GRAD SYS DOP V1V2: 3.2 MMHG
AV VMAX SYS DOP: 125.7 CM/SEC
BH CV ECHO MEAS - ACS: 2.05 CM
BH CV ECHO MEAS - AO MAX PG: 6.3 MMHG
BH CV ECHO MEAS - AO ROOT DIAM: 3.4 CM
BH CV ECHO MEAS - AO V2 VTI: 30.3 CM
BH CV ECHO MEAS - AVA(I,D): 2.45 CM2
BH CV ECHO MEAS - EDV(CUBED): 70.7 ML
BH CV ECHO MEAS - EDV(MOD-SP2): 66 ML
BH CV ECHO MEAS - EDV(MOD-SP4): 87 ML
BH CV ECHO MEAS - EF(MOD-SP2): 45.5 %
BH CV ECHO MEAS - EF(MOD-SP4): 66.7 %
BH CV ECHO MEAS - ESV(CUBED): 17.4 ML
BH CV ECHO MEAS - ESV(MOD-SP2): 36 ML
BH CV ECHO MEAS - ESV(MOD-SP4): 29 ML
BH CV ECHO MEAS - FS: 37.4 %
BH CV ECHO MEAS - IVS/LVPW: 1.02 CM
BH CV ECHO MEAS - IVSD: 1.09 CM
BH CV ECHO MEAS - LAT PEAK E' VEL: 4.2 CM/SEC
BH CV ECHO MEAS - LV DIASTOLIC VOL/BSA (35-75): 51 CM2
BH CV ECHO MEAS - LV MASS(C)D: 149.4 GRAMS
BH CV ECHO MEAS - LV MAX PG: 3.5 MMHG
BH CV ECHO MEAS - LV MEAN PG: 2.12 MMHG
BH CV ECHO MEAS - LV SYSTOLIC VOL/BSA (12-30): 17 CM2
BH CV ECHO MEAS - LV V1 MAX: 94.1 CM/SEC
BH CV ECHO MEAS - LV V1 VTI: 23.9 CM
BH CV ECHO MEAS - LVIDD: 4.1 CM
BH CV ECHO MEAS - LVIDS: 2.6 CM
BH CV ECHO MEAS - LVOT AREA: 3.1 CM2
BH CV ECHO MEAS - LVOT DIAM: 1.99 CM
BH CV ECHO MEAS - LVPWD: 1.07 CM
BH CV ECHO MEAS - MED PEAK E' VEL: 2.7 CM/SEC
BH CV ECHO MEAS - MV A DUR: 0.12 SEC
BH CV ECHO MEAS - MV A MAX VEL: 116 CM/SEC
BH CV ECHO MEAS - MV DEC SLOPE: 208.5 CM/SEC2
BH CV ECHO MEAS - MV DEC TIME: 0.27 SEC
BH CV ECHO MEAS - MV E MAX VEL: 65.1 CM/SEC
BH CV ECHO MEAS - MV E/A: 0.56
BH CV ECHO MEAS - MV MAX PG: 4.5 MMHG
BH CV ECHO MEAS - MV MEAN PG: 1.27 MMHG
BH CV ECHO MEAS - MV P1/2T: 96.9 MSEC
BH CV ECHO MEAS - MV V2 VTI: 32.2 CM
BH CV ECHO MEAS - MVA(P1/2T): 2.27 CM2
BH CV ECHO MEAS - MVA(VTI): 2.31 CM2
BH CV ECHO MEAS - PA V2 MAX: 69.9 CM/SEC
BH CV ECHO MEAS - PULM A REVS DUR: 0.14 SEC
BH CV ECHO MEAS - PULM A REVS VEL: 27.9 CM/SEC
BH CV ECHO MEAS - PULM DIAS VEL: 31.7 CM/SEC
BH CV ECHO MEAS - PULM S/D: 2.19
BH CV ECHO MEAS - PULM SYS VEL: 69.5 CM/SEC
BH CV ECHO MEAS - RAP SYSTOLE: 3 MMHG
BH CV ECHO MEAS - RV MAX PG: 1.95 MMHG
BH CV ECHO MEAS - RV V1 MAX: 69.8 CM/SEC
BH CV ECHO MEAS - RV V1 VTI: 14.4 CM
BH CV ECHO MEAS - SV(LVOT): 74.2 ML
BH CV ECHO MEAS - SV(MOD-SP2): 30 ML
BH CV ECHO MEAS - SV(MOD-SP4): 58 ML
BH CV ECHO MEAS - SVI(LVOT): 43.5 ML/M2
BH CV ECHO MEAS - SVI(MOD-SP2): 17.6 ML/M2
BH CV ECHO MEAS - SVI(MOD-SP4): 34 ML/M2
BH CV ECHO MEAS - TAPSE (>1.6): 2.46 CM
BH CV ECHO MEASUREMENTS AVERAGE E/E' RATIO: 18.87
BH CV ECHO SHUNT ASSESSMENT PERFORMED (HIDDEN SCRIPTING): 1
BH CV XLRA - RV BASE: 3.1 CM
BH CV XLRA - RV LENGTH: 6.5 CM
BH CV XLRA - RV MID: 2.9 CM
BH CV XLRA - TDI S': 8.1 CM/SEC
BUN SERPL-MCNC: 12 MG/DL (ref 8–23)
BUN/CREAT SERPL: 15.8 (ref 7–25)
CALCIUM SPEC-SCNC: 9.8 MG/DL (ref 8.6–10.5)
CHLORIDE SERPL-SCNC: 103 MMOL/L (ref 98–107)
CHOLEST SERPL-MCNC: 184 MG/DL (ref 0–200)
CO2 SERPL-SCNC: 25.9 MMOL/L (ref 22–29)
CREAT SERPL-MCNC: 0.76 MG/DL (ref 0.57–1)
DEPRECATED RDW RBC AUTO: 42.7 FL (ref 37–54)
EGFRCR SERPLBLD CKD-EPI 2021: 76.4 ML/MIN/1.73
ERYTHROCYTE [DISTWIDTH] IN BLOOD BY AUTOMATED COUNT: 13 % (ref 12.3–15.4)
GLUCOSE BLDC GLUCOMTR-MCNC: 84 MG/DL (ref 70–130)
GLUCOSE BLDC GLUCOMTR-MCNC: 89 MG/DL (ref 70–130)
GLUCOSE SERPL-MCNC: 101 MG/DL (ref 65–99)
HBA1C MFR BLD: 5 % (ref 4.8–5.6)
HCT VFR BLD AUTO: 41.5 % (ref 34–46.6)
HDLC SERPL-MCNC: 44 MG/DL (ref 40–60)
HGB BLD-MCNC: 13.4 G/DL (ref 12–15.9)
LDLC SERPL CALC-MCNC: 123 MG/DL (ref 0–100)
LDLC/HDLC SERPL: 2.76 {RATIO}
LEFT ATRIUM VOLUME INDEX: 32.6 ML/M2
LV EF BIPLANE MOD: 58.7 %
MCH RBC QN AUTO: 28.9 PG (ref 26.6–33)
MCHC RBC AUTO-ENTMCNC: 32.3 G/DL (ref 31.5–35.7)
MCV RBC AUTO: 89.4 FL (ref 79–97)
PLATELET # BLD AUTO: 210 10*3/MM3 (ref 140–450)
PMV BLD AUTO: 10.8 FL (ref 6–12)
POTASSIUM SERPL-SCNC: 4 MMOL/L (ref 3.5–5.2)
QT INTERVAL: 411 MS
QTC INTERVAL: 430 MS
RBC # BLD AUTO: 4.64 10*6/MM3 (ref 3.77–5.28)
SINUS: 3.3 CM
SODIUM SERPL-SCNC: 140 MMOL/L (ref 136–145)
STJ: 3.1 CM
TRIGL SERPL-MCNC: 92 MG/DL (ref 0–150)
VLDLC SERPL-MCNC: 17 MG/DL (ref 5–40)
WBC NRBC COR # BLD AUTO: 10.06 10*3/MM3 (ref 3.4–10.8)

## 2025-04-18 PROCEDURE — 80048 BASIC METABOLIC PNL TOTAL CA: CPT

## 2025-04-18 PROCEDURE — 99204 OFFICE O/P NEW MOD 45 MIN: CPT | Performed by: PSYCHIATRY & NEUROLOGY

## 2025-04-18 PROCEDURE — 85027 COMPLETE CBC AUTOMATED: CPT

## 2025-04-18 PROCEDURE — 82948 REAGENT STRIP/BLOOD GLUCOSE: CPT

## 2025-04-18 PROCEDURE — 80061 LIPID PANEL: CPT | Performed by: NURSE PRACTITIONER

## 2025-04-18 PROCEDURE — 93306 TTE W/DOPPLER COMPLETE: CPT | Performed by: STUDENT IN AN ORGANIZED HEALTH CARE EDUCATION/TRAINING PROGRAM

## 2025-04-18 PROCEDURE — 97161 PT EVAL LOW COMPLEX 20 MIN: CPT

## 2025-04-18 PROCEDURE — 93306 TTE W/DOPPLER COMPLETE: CPT

## 2025-04-18 PROCEDURE — 83036 HEMOGLOBIN GLYCOSYLATED A1C: CPT | Performed by: NURSE PRACTITIONER

## 2025-04-18 PROCEDURE — G0378 HOSPITAL OBSERVATION PER HR: HCPCS

## 2025-04-18 RX ORDER — LOSARTAN POTASSIUM 50 MG/1
100 TABLET ORAL
Status: DISCONTINUED | OUTPATIENT
Start: 2025-04-18 | End: 2025-04-18 | Stop reason: HOSPADM

## 2025-04-18 RX ORDER — ASPIRIN 81 MG/1
81 TABLET, CHEWABLE ORAL DAILY
Qty: 30 TABLET | Refills: 0 | Status: SHIPPED | OUTPATIENT
Start: 2025-04-19

## 2025-04-18 RX ORDER — LEVOTHYROXINE SODIUM 75 UG/1
75 TABLET ORAL
Qty: 30 TABLET | Refills: 0 | Status: SHIPPED | OUTPATIENT
Start: 2025-04-19

## 2025-04-18 RX ORDER — METOPROLOL SUCCINATE 100 MG/1
200 TABLET, EXTENDED RELEASE ORAL DAILY
Status: DISCONTINUED | OUTPATIENT
Start: 2025-04-18 | End: 2025-04-18 | Stop reason: HOSPADM

## 2025-04-18 RX ORDER — ASPIRIN 81 MG/1
81 TABLET, CHEWABLE ORAL DAILY
Status: DISCONTINUED | OUTPATIENT
Start: 2025-04-18 | End: 2025-04-18 | Stop reason: HOSPADM

## 2025-04-18 RX ORDER — AMLODIPINE BESYLATE 5 MG/1
10 TABLET ORAL DAILY
Status: DISCONTINUED | OUTPATIENT
Start: 2025-04-18 | End: 2025-04-18 | Stop reason: HOSPADM

## 2025-04-18 RX ADMIN — Medication 10 ML: at 09:09

## 2025-04-18 RX ADMIN — LEVOTHYROXINE SODIUM 75 MCG: 0.03 TABLET ORAL at 05:21

## 2025-04-18 RX ADMIN — LOSARTAN POTASSIUM 100 MG: 50 TABLET, FILM COATED ORAL at 12:28

## 2025-04-18 RX ADMIN — ASPIRIN 81 MG CHEWABLE TABLET 81 MG: 81 TABLET CHEWABLE at 12:28

## 2025-04-18 RX ADMIN — PANTOPRAZOLE SODIUM 40 MG: 40 TABLET, DELAYED RELEASE ORAL at 05:21

## 2025-04-18 RX ADMIN — AMLODIPINE BESYLATE 10 MG: 5 TABLET ORAL at 12:28

## 2025-04-18 RX ADMIN — FUROSEMIDE 20 MG: 20 TABLET ORAL at 12:29

## 2025-04-18 NOTE — PLAN OF CARE
Goal Outcome Evaluation:      Pt admitted to the observation unit for further evaluation of her stroke-like symptoms. Symptoms resolved at this time. MRI completed. NIHSS 0. Echo ordered. Pt is alert and oriented x4. Neurology consult.       Problem: Adult Inpatient Plan of Care  Goal: Plan of Care Review  Outcome: Progressing  Goal: Patient-Specific Goal (Individualized)  Outcome: Progressing  Goal: Absence of Hospital-Acquired Illness or Injury  Outcome: Progressing  Intervention: Identify and Manage Fall Risk  Recent Flowsheet Documentation  Taken 4/18/2025 0414 by Lennox Barry RN  Safety Promotion/Fall Prevention:   safety round/check completed   room organization consistent   clutter free environment maintained   assistive device/personal items within reach   activity supervised  Taken 4/18/2025 0204 by Lennox Barry RN  Safety Promotion/Fall Prevention:   safety round/check completed   room organization consistent   clutter free environment maintained   assistive device/personal items within reach   activity supervised  Taken 4/18/2025 0009 by Lennox Barry RN  Safety Promotion/Fall Prevention:   safety round/check completed   room organization consistent   assistive device/personal items within reach   clutter free environment maintained  Taken 4/17/2025 2203 by Lennox Barry RN  Safety Promotion/Fall Prevention:   safety round/check completed   room organization consistent   clutter free environment maintained   assistive device/personal items within reach   activity supervised  Taken 4/17/2025 2120 by Lennox Barry RN  Safety Promotion/Fall Prevention:   safety round/check completed   room organization consistent   clutter free environment maintained   assistive device/personal items within reach  Taken 4/17/2025 2045 by Lennox Barry RN  Safety Promotion/Fall Prevention: patient off unit  Intervention: Prevent Skin Injury  Recent Flowsheet Documentation  Taken 4/18/2025 0009 by Lennox Barry  RN  Body Position: position changed independently  Taken 4/17/2025 2120 by Lennox Barry RN  Body Position: position changed independently  Goal: Optimal Comfort and Wellbeing  Outcome: Progressing  Intervention: Provide Person-Centered Care  Recent Flowsheet Documentation  Taken 4/17/2025 2120 by Lennox Barry RN  Trust Relationship/Rapport:   care explained   choices provided  Goal: Readiness for Transition of Care  Outcome: Progressing     Problem: Comorbidity Management  Goal: Blood Glucose Level Within Target Range  Outcome: Progressing  Goal: Blood Pressure in Desired Range  Outcome: Progressing

## 2025-04-18 NOTE — THERAPY EVALUATION
Patient Name: Tiff Del Rio  : 1940    MRN: [id number]                              Today's Date: 2025       Admit Date: 2025    Visit Dx:     ICD-10-CM ICD-9-CM   1. TIA (transient ischemic attack)  G45.9 435.9     Patient Active Problem List   Diagnosis    Essential hypertension    Malignant neoplasm of upper-outer quadrant of right breast in female, estrogen receptor positive    Osteoporosis    Contusion of knee and lower leg, left, subsequent encounter    TIA (transient ischemic attack)    Hyperthyroidism- on suppressive therapy    B12 deficiency anemia    Type 2 diabetes mellitus with neurologic complication    Stroke-like symptoms     Past Medical History:   Diagnosis Date    Arthritis     Breast cancer     Diabetes mellitus     Osteoporosis     Stroke     Hemorrhagic    Stroke, hemorrhagic     Thyroid cancer     Thyroid disease     TIA (transient ischemic attack)      Past Surgical History:   Procedure Laterality Date    BREAST BIOPSY Right 10/2014    BREAST SURGERY  2014    Right mastectomy    EYE SURGERY      Cataract  and     HYSTERECTOMY  1972    With oophorectomy    MASTECTOMY Right 10/2014    THYROIDECTOMY  2010      General Information       Row Name 25 0834          Physical Therapy Time and Intention    Document Type evaluation  -SM     Mode of Treatment individual therapy;physical therapy  -       Row Name 25 0834          General Information    Patient Profile Reviewed yes  -SM     Prior Level of Function independent:  -SM       Row Name 25 0834          Living Environment    Current Living Arrangements home  -     People in Home spouse  -       Row Name 25 0834          Home Main Entrance    Number of Stairs, Main Entrance three  -       Row Name 25 0834          Cognition    Orientation Status (Cognition) oriented x 4  -SM               User Key  (r) = Recorded By, (t) = Taken By, (c) = Cosigned By      Initials Name Provider  Type     Tasneem Arriaga, ERICA Physical Therapist                   Mobility       Row Name 04/18/25 0835          Bed Mobility    Bed Mobility supine-sit;sit-supine  -SM     Supine-Sit [place] (Bed Mobility) independent  -SM     Sit-Supine [place] (Bed Mobility) independent  -     Assistive Device (Bed Mobility) head of bed elevated  -       Row Name 04/18/25 0835          Sit-Stand Transfer    Sit-Stand [place] (Transfers) supervision  -       Row Name 04/18/25 0835          Gait/Stairs (Locomotion)    [place] Level (Gait) supervision  -     Distance in Feet (Gait) 150  -     Deviations/Abnormal Patterns (Gait) jax decreased;gait speed decreased  -     Comment, (Gait/Stairs) Gait steady with no overt LOB noted.  -               User Key  (r) = Recorded By, (t) = Taken By, (c) = Cosigned By      Initials Name Provider Type     Tasneem Arriaga PT Physical Therapist                   Obj/Interventions       Row Name 04/18/25 0844          Range of Motion Comprehensive    General Range of Motion bilateral lower extremity ROM WFL  -[facility] Name 04/18/25 0844          Strength Comprehensive (MMT)    Comment, General Manual Muscle Testing (MMT) Assessment No unilateral strength deficits identified.  -       Row Name 04/18/25 0844          Balance    Balance Assessment sitting static balance;sitting dynamic balance;standing static balance;standing dynamic balance  -     Static Sitting Balance independent  -     Dynamic Sitting Balance modified independence  -     Position, Sitting Balance sitting edge of bed  -     Static Standing Balance supervision  -     Dynamic Standing Balance supervision  -     Position/Device Used, Standing Balance unsupported  -     Balance Interventions sitting;standing;sit to stand;static;dynamic  -               User Key  (r) = Recorded By, (t) = Taken By, (c) = Cosigned By      Initials Name Provider Type    SHABBIR Arriaga  Tasneem, PT Physical Therapist                   Goals/Plan    No documentation.                  Clinical Impression       Row Name 04/18/25 0846          Pain    Pretreatment Pain Rating 0/10 - no pain  -SM     Posttreatment Pain Rating 0/10 - no pain  -SM       Row Name 04/18/25 0846          Plan of Care Review    Plan of Care Reviewed With patient  -SM     Outcome Evaluation Patient is an 84 y.o female who presented to [facility] with a headache and vision changes. Patient AOx4 supine in bed upon arrival. Patient lives at home with her spouse with a few JOSE FRANCISCO. Patient is independent with no AD. Patient completed all bed mobility independently. Patient stood and ambulated 150ft around unit with SV. Gait steady with no overt LOB noted. Patient feels all symptoms have resolved. No further acute PT needs identified. PT will sign off.  -       Row Name 04/18/25 0846          Therapy Assessment/Plan (PT)    Criteria for Skilled Interventions Met (PT) no;no problems identified which require skilled intervention  -     Therapy Frequency (PT) evaluation only  -       Row Name 04/18/25 0846          Vital Signs    Pre Patient Position Supine  -SM     Intra Patient Position Standing  -SM     Post Patient Position Supine  -SM       Row Name 04/18/25 0846          Positioning and Restraints    Pre-Treatment Position in bed  -SM     Post Treatment Position bed  -SM     In Bed notified nsg;call light within reach;encouraged to call for assist;fowlers  -               User Key  (r) = Recorded By, (t) = Taken By, (c) = Cosigned By      Initials Name Provider Type     Tasneem Arriaga, PT Physical Therapist                   Outcome Measures       Row Name 04/18/25 0848 04/17/25 2125       How much help from another person do you currently need...    Turning from your back to your side while in flat bed without using bedrails? 4  -SM 4  -NS    Moving from lying on back to sitting on the side of a flat bed without bedrails? 4   - 4  -NS    Moving to and from a bed to a chair (including a wheelchair)? 4  -SM 4  -NS    Standing up from a chair using your arms (e.g., wheelchair, bedside chair)? 4  -SM 4  -NS    Climbing 3-5 steps with a railing? 4  -SM 3  -NS    To walk in hospital room? 4  -SM 4  -NS    AM-PAC 6 Clicks Score (PT) 24  - 23  -NS    Highest Level of Mobility Goal 8 --> Walked 250 feet or more  - 7 --> Walk 25 feet or more  -NS      Row Name 04/18/25 0848          Functional Assessment    Outcome Measure Options AM-PAC 6 Clicks Basic Mobility (PT)  -               User Key  (r) = Recorded By, (t) = Taken By, (c) = Cosigned By      Initials Name Provider Type     Tasneem Arriaga, PT Physical Therapist    Lennox Wolfe RN Registered Nurse                                 Physical Therapy Education       Title: PT OT SLP Therapies (Done)       Topic: Physical Therapy (Done)       Point: Mobility training (Done)       Learning Progress Summary            Patient Acceptance, E, VU by  at 4/18/2025 0848                      Point: Home exercise program (Done)       Learning Progress Summary            Patient Acceptance, E, VU by  at 4/18/2025 0848                      Point: Body mechanics (Done)       Learning Progress Summary            Patient Acceptance, E, VU by  at 4/18/2025 0848                      Point: Precautions (Done)       Learning Progress Summary            Patient Acceptance, E, VU by  at 4/18/2025 0848                                      User Key       Initials Effective Dates Name Provider Type [facility] 05/02/22 -  Tasneem Arriaga, ERICA Physical Therapist PT                  PT Recommendation and Plan     Outcome Evaluation: Patient is an 84 y.o female who presented to [facility] with a headache and vision changes. Patient AOx4 supine in bed upon arrival. Patient lives at home with her spouse with a few JOSE FRANCISCO. Patient is independent with no AD. Patient completed all bed mobility  independently. Patient stood and ambulated 150ft around unit with SV. Gait steady with no overt LOB noted. Patient feels all symptoms have resolved. No further acute PT needs identified. PT will sign off.     Time Calculation:         PT Charges       Row Name 04/18/25 0849             Time Calculation    Start Time 0822  -      Stop Time 0830  -      Time Calculation (min) 8 min  -      PT Received On 04/18/25  -                User Key  (r) = Recorded By, (t) = Taken By, (c) = Cosigned By      Initials Name Provider Type     Tasneem Arriaga PT Physical Therapist                  Therapy Charges for Today       Code Description Service Date Service Provider Modifiers Qty    25794314208 HC PT EVAL LOW COMPLEXITY 3 4/18/2025 Tasneem Arriaga, ERICA GP 1            PT G-Codes  Outcome Measure Options: AM-PAC 6 Clicks Basic Mobility (PT)  AM-PAC 6 Clicks Score (PT): 24  PT Discharge Summary  Anticipated Discharge Disposition (PT): home with assist    Tasneem Arriaga PT  4/18/2025

## 2025-04-18 NOTE — PLAN OF CARE
Goal Outcome Evaluation:  Plan of Care Reviewed With: patient           Outcome Evaluation: Patient is an 84 y.o female who presented to [facility] with a headache and vision changes. Patient AOx4 supine in bed upon arrival. Patient lives at home with her spouse with a few JOSE FRANCISCO. Patient is independent with no AD. Patient completed all bed mobility independently. Patient stood and ambulated 150ft around unit with SV. Gait steady with no overt LOB noted. Patient feels all symptoms have resolved. No further acute PT needs identified. PT will sign off.    Anticipated Discharge Disposition (PT): home with assist

## 2025-04-18 NOTE — CONSULTS
"Neurology Consult Note    Consult Date: 4/18/2025    Referring MD: Dr. Mason    Reason for Consult I have been asked to see the patient in neurological consultation to render advice and opinion regarding vision changes, word finding difficulty    Tiff Del Rio is a 84 y.o. female with hypertension, thyroid cancer status post thyroidectomy on replacement who presented to the hospital yesterday complaining of vision changes and difficulty speaking.  She reports that she stopped taking her blood pressure medications and developed severe hypertension at home.  She developed a visual distortion that spread across the visual field of both eyes.  This lasted about 30 minutes.  Following that she had significant difficulty with speech.  This gradually resolved shortly after presentation and she developed a moderate headache.  Today blood pressure is slightly better.  She has no neurologic complaints this morning.    Past Medical History:   Diagnosis Date    Arthritis     Breast cancer     Diabetes mellitus     Osteoporosis     Stroke     Hemorrhagic    Stroke, hemorrhagic 2004    Thyroid cancer     Thyroid disease     TIA (transient ischemic attack)        Exam  /69 (BP Location: Left arm, Patient Position: Lying) Comment: RN notified  Pulse 53   Temp 97.9 °F (36.6 °C) (Oral)   Resp 18   Ht 162.6 cm (64\")   Wt 65.8 kg (145 lb)   SpO2 95%   BMI 24.89 kg/m²   Gen: NAD, vitals reviewed  MS: oriented x3, recent/remote memory intact, normal attention/concentration, language intact, no neglect.  CN: visual acuity grossly normal, visual fields full PERRL, EOMI, no facial droop, no dysarthria  Motor: 5/5 throughout upper and lower extremities, normal tone  Sensory: Intact to cold temperature and vibration throughout    DATA:    Lab Results   Component Value Date    GLUCOSE 101 (H) 04/18/2025    CALCIUM 9.8 04/18/2025     04/18/2025    K 4.0 04/18/2025    CO2 25.9 04/18/2025     04/18/2025    BUN 12 " 04/18/2025    CREATININE 0.76 04/18/2025    EGFRIFAFRI >60 12/16/2022    EGFRIFNONA 81 05/13/2019    BCR 15.8 04/18/2025    ANIONGAP 11.1 04/18/2025     Lab Results   Component Value Date    WBC 10.06 04/18/2025    HGB 13.4 04/18/2025    HCT 41.5 04/18/2025    MCV 89.4 04/18/2025     04/18/2025       Lab review: CBC, BMP reviewed    Imaging review: I personally reviewed her brain MRI performed since admission which shows no evidence of acute stroke, she does have focal atrophy affecting the bilateral parietal and to some degree occipital lobes.  She has mild generalized atrophy overall.  CTA by radiology report shows findings consistent with intracranial atherosclerosis.  She has a right frontal meningioma.  Looking back to her MRI from 2015 this appears to have been stable since then    Diagnoses:  Hypertensive urgency  Migraine aura without status migrainosus, not intractable  Intracranial atherosclerosis  Right frontal meningioma    Pre-stroke MRS: 0  NIHSS: 0    Comment: Hypertensive urgency which likely provoked migraine.  Currently back to baseline.  We discussed the importance of restarting her blood pressure medication at home.    PLAN:  -Aspirin, statin given intracranial atherosclerosis  -Restart home BP medication and monitor closely  -Her meningioma does not require follow-up; it has not increased in size in 10 years    Okay for discharge from a neurology standpoint.

## 2025-04-18 NOTE — PROGRESS NOTES
MD Attestation Note    SHARED VISIT: This visit was performed by BOTH a physician and an APC. The substantive portion of the medical decision making was performed by this attesting physician who made or approved the management plan and takes responsibility for patient management. All studies in the APC note (if performed) were independently interpreted by me.       My personal findings are:        Subjective:  Patient with history of hypertension, breast cancer, CVA, diabetes presented with acute vision changes and speech disturbance.  Today she states that all symptoms have resolved.  Her blood pressure was also quite elevated in the emergency department.  She had not been taking her blood pressure medication for the last several days.        Objective:  GENERAL: Awake, alert, in no acute distress.  Working a crossword puzzle.  HENT:  Normocephalic, atraumatic. Nares patent.   EYES: Pupils equal, reactive. Extraocular movements normal.   RESPIRATORY: Normal effort.   CARDIOVASCULAR: Regular rhythm, normal rate.  ABDOMEN:  Nontender. Abdomen nondistended.   NEUROLOGIC: Cranial nerves II-XII normal. Motor strength 5/5 in extremities.   EXTREMITIES: No pedal edema. 2+ pedal pulses bilaterally. No deformity.   SKIN:  No rash, normal to inspection.          Assessment/ Plan:  Transient vision and speech disturbance  CTA head and neck negative for critical stenosis  MRI negative for acute infarct.  Relatively stable frontal meningioma now measuring up to 11 mm, previously 9 mm in 2019 on MRI  Neurology consulted, final recommendations pending  Resume home blood pressure medications, compliance encouraged  Continue home statin  Echo mentioned cystic structure in the liver, she does have evidence of a liver cyst noted on prior CT chest from 2014.

## 2025-04-18 NOTE — SIGNIFICANT NOTE
04/18/25 0903   OTHER   Discipline occupational therapist   Rehab Time/Intention   Session Not Performed   (Spoke w/ PT who reports pt is up (I), all symtoms resolved and pt is back to baseline. No strength, balance, or coordination deficits. Skilled OT not indicated at this time and will sign off)

## 2025-04-18 NOTE — DISCHARGE SUMMARY
ED OBSERVATION PROGRESS/DISCHARGE SUMMARY    Date of Admission: 4/17/2025   LOS: 0 days   PCP: Rodri Haney MD    Final Diagnosis: Hypertensive urgency, migraine with aura, intracranial atherosclerosis, right frontal meningioma    Hospital Outcome:     Tiff Del Rio is a 84 y.o. female presents with transient headache, vision changes, and dysarthria.  In the ED she was found to be hypertensive with BP as high as 232/125.  She admits she has not been taking any of her prescribed antihypertensives.      Workup so far includes troponin 8, 8.  Unremarkable CBC, CMP.  EKG is nonischemic, sinus rhythm.  CTA head and neck without acute intracranial abnormalities, atherosclerosis noted.  MRI brain without acute changes.  There is a old lacunar infarct in the left thalamus and a stable right frontal meningioma.  Echocardiogram shows normal systolic function, LV hypertrophy, grade 1 impaired diastolic function, no significant valvular disease, negative saline test.    Neurology was consulted has evaluated the patient.  They suspect hypertensive urgency likely caused a migraine yesterday.  She did denies any headache today, no focal deficits on exam.  She is ambulating around the unit without difficulty.  They advise resuming her home blood pressure medications.  She has been bradycardic, advised her to hold her beta-blocker for now.  TSH was low with an elevated TSH.  Levothyroxine was reduced to 75 mcg daily.  She was encouraged to follow-up with her ENT as soon as possible regarding her levothyroxine dosing.  She was instructed to check her blood pressure twice daily for the next 10 days and record readings for follow-up with her primary care doctor.  She will be discharged home.    Review of Systems:   Constitutional:  No weight changes, fever, or chills. No night sweats, no fatigue, no malaise.    Cardiovascular:  No chest pain, no palpitations, no edema.      Respiratory:  No cough, no smoke exposure, no  dyspnea, no orthopnea.   Gastrointestinal:  No nausea, vomiting, or diarrhea. No constipation, or GI discomfort. No reflux pain, no anorexia, no dysphagia. No hematochezia or melena.    Neuro:  No weakness, no numbness, no paresthesias, no loss of consciousness, no syncope, no dizziness, no headache.     Objective   Physical Exam:   Constitutional: Awake, alert. Well developed for age. Nontoxic appearing.   Eyes: PERRL x2, sclerae anicteric, no conjunctival injection. No EOM abnormalities noted.   HENT: NCAT, mucous membranes moist,   Neck: Supple, no thyromegaly, no lymphadenopathy, trachea midline  Respiratory: Clear to auscultation bilaterally, nonlabored respirations   Cardiovascular: RRR, no murmurs, rubs, or gallops, palpable pedal pulses bilaterally. No appreciable edema.   Gastrointestinal: Positive bowel sounds, soft, nontender, not distended.   Musculoskeletal: No bilateral ankle edema, no clubbing or cyanosis to extremities. No obvious deformities.   Psychiatric: Appropriate affect, cooperative. Converses appropriately for age.   Neurologic: Oriented x 3, strength symmetric in all extremities. Cranial nerves grossly intact to confrontation, speech clear  Skin: No rashes, skin intact.     Results Review:    I have reviewed the labs, radiology results and diagnostic studies.    Results from last 7 days   Lab Units 04/18/25  0142   WBC 10*3/mm3 10.06   HEMOGLOBIN g/dL 13.4   HEMATOCRIT % 41.5   PLATELETS 10*3/mm3 210     Results from last 7 days   Lab Units 04/18/25  0142 04/17/25  1437   SODIUM mmol/L 140 142   POTASSIUM mmol/L 4.0 3.6   CHLORIDE mmol/L 103 105   CO2 mmol/L 25.9 26.3   BUN mg/dL 12 14   CREATININE mg/dL 0.76 0.83   CALCIUM mg/dL 9.8 10.0   BILIRUBIN mg/dL  --  0.6   ALK PHOS U/L  --  86   ALT (SGPT) U/L  --  7   AST (SGOT) U/L  --  16   GLUCOSE mg/dL 101* 93     Imaging Results (Last 24 Hours)       Procedure Component Value Units Date/Time    CT Angiogram Head [[id number]] Collected:  04/17/25 1711     Updated: 04/18/25 0917    Narrative:      CT ANGIOGRAM HEAD, NECK WITH IV CONTRAST     HISTORY: Hypertension, breast cancer. Strokelike symptoms.     TECHNIQUE: CT head without contrast followed by CT angiogram head and  neck with IV contrast in the arterial phase and data reconstructed in  coronal and sagittal planes and three-dimensional volume rendering was  performed. Post contrast-enhanced head CT was obtained.      COMPARISON: MR angiogram of the head and neck 05/12/2019.     FINDINGS:  Cerebral atrophy with prominence of the sulci. There are no abnormal  areas of increased attenuation intra-axially to suggest hemorrhage.  There is moderate chronic small vessel ischemic white matter change. No  evidence for mass effect or shift of the midline structures.     Great vessel origins are patent. There are calcified plaques involving  the carotid bulbs and proximal internal carotid arteries. Bilateral 15%  stenoses are present of the proximal ICAs by NASCET criteria. Incidental  note of medial deviation of right internal carotid artery to the midline  in the prevertebral space. Cervical, petrous, cavernous, supracavernous  internal carotid arteries are patent. Calcified plaques are present  involving the cavernous segments of both internal carotid arteries with  mild narrowing. M1 segments of both middle cerebral arteries are patent.  There are multifocal stenoses involving the left A2 segment of the  anterior cerebral artery with up to severe stenosis greatest 17 mm  distal to the origin of the A2 segment left anterior cerebral artery.  The A1 segment of the right anterior cerebral artery is very small.  There is a moderate stenosis of the right middle cerebral artery  superior division 1 cm distal to the trifurcation that appears new when  compared to the MRI head 5/12/2019..     Proximal left vertebral artery is tortuous though is patent. Left  vertebral artery is larger than the right. Right  vertebral artery is  patent. The intracranial segment of the right vertebral artery is very  small and this was also evident on previous MR angiogram brain. The  basilar artery, both posterior cerebral arteries are patent. There is a  fetal origin of the left posterior cerebral artery.     Post contrast enhanced head CT enhancing mass anterolateral to the right  frontal lobe measuring 10 x 6 x 14 mm. This is increased in size  compared to MRI brain 05/12/2019 and is favored to represent a  meningioma.       Impression:      1. No evidence for acute intracranial abnormality. Further evaluation  can be performed with brain MRI if indicated.  2. Atherosclerotic calcified plaques are present involving the proximal  ICAs with 15% stenoses bilaterally internal carotid arteries. No large  vessel occlusion. Incidental note of medial deviation of the right  internal carotid artery to the midline into the prevertebral space.  3. Intracranial cerebral vascular stenoses involving the distal A2  segments left anterior cerebral artery and the M2 superior division of  the right middle cerebral artery. The left A2 segment stenosis is  without change compared to MR angiogram head 5/12/2019 and the right M2  segment stenosis appears to be new when compared to the previous exam.  4. Cerebral atrophy with moderate chronic small vessel ischemic white  matter change.  5. Dural based enhancing mass lateral to the right frontal lobe measures  increased in size compared to the brain MRI 05/12/2019 and is consistent  with a small meningioma.     Findings of the intracranial vascular stenoses best demonstrated on the  3-dimensional volume rendering and discussed with the provider caring  for the patient, Sandee in the observation unit on 4/18/2025 at 9:10 a.m.     This report was finalized on 4/18/2025 9:14 AM by Tian Gonzalez M.D  on Workstation: [id number]       CT Angiogram Neck [[id number]] Collected: 04/17/25 1711     Updated:  04/18/25 0917    Narrative:      CT ANGIOGRAM HEAD, NECK WITH IV CONTRAST     HISTORY: Hypertension, breast cancer. Strokelike symptoms.     TECHNIQUE: CT head without contrast followed by CT angiogram head and  neck with IV contrast in the arterial phase and data reconstructed in  coronal and sagittal planes and three-dimensional volume rendering was  performed. Post contrast-enhanced head CT was obtained.      COMPARISON: MR angiogram of the head and neck 05/12/2019.     FINDINGS:  Cerebral atrophy with prominence of the sulci. There are no abnormal  areas of increased attenuation intra-axially to suggest hemorrhage.  There is moderate chronic small vessel ischemic white matter change. No  evidence for mass effect or shift of the midline structures.     Great vessel origins are patent. There are calcified plaques involving  the carotid bulbs and proximal internal carotid arteries. Bilateral 15%  stenoses are present of the proximal ICAs by NASCET criteria. Incidental  note of medial deviation of right internal carotid artery to the midline  in the prevertebral space. Cervical, petrous, cavernous, supracavernous  internal carotid arteries are patent. Calcified plaques are present  involving the cavernous segments of both internal carotid arteries with  mild narrowing. M1 segments of both middle cerebral arteries are patent.  There are multifocal stenoses involving the left A2 segment of the  anterior cerebral artery with up to severe stenosis greatest 17 mm  distal to the origin of the A2 segment left anterior cerebral artery.  The A1 segment of the right anterior cerebral artery is very small.  There is a moderate stenosis of the right middle cerebral artery  superior division 1 cm distal to the trifurcation that appears new when  compared to the MRI head 5/12/2019..     Proximal left vertebral artery is tortuous though is patent. Left  vertebral artery is larger than the right. Right vertebral artery is  patent.  The intracranial segment of the right vertebral artery is very  small and this was also evident on previous MR angiogram brain. The  basilar artery, both posterior cerebral arteries are patent. There is a  fetal origin of the left posterior cerebral artery.     Post contrast enhanced head CT enhancing mass anterolateral to the right  frontal lobe measuring 10 x 6 x 14 mm. This is increased in size  compared to MRI brain 05/12/2019 and is favored to represent a  meningioma.       Impression:      1. No evidence for acute intracranial abnormality. Further evaluation  can be performed with brain MRI if indicated.  2. Atherosclerotic calcified plaques are present involving the proximal  ICAs with 15% stenoses bilaterally internal carotid arteries. No large  vessel occlusion. Incidental note of medial deviation of the right  internal carotid artery to the midline into the prevertebral space.  3. Intracranial cerebral vascular stenoses involving the distal A2  segments left anterior cerebral artery and the M2 superior division of  the right middle cerebral artery. The left A2 segment stenosis is  without change compared to MR angiogram head 5/12/2019 and the right M2  segment stenosis appears to be new when compared to the previous exam.  4. Cerebral atrophy with moderate chronic small vessel ischemic white  matter change.  5. Dural based enhancing mass lateral to the right frontal lobe measures  increased in size compared to the brain MRI 05/12/2019 and is consistent  with a small meningioma.     Findings of the intracranial vascular stenoses best demonstrated on the  3-dimensional volume rendering and discussed with the provider caring  for the patient, Sandee in the observation unit on 4/18/2025 at 9:10 a.m.     This report was finalized on 4/18/2025 9:14 AM by Tian Gonzalez M.D  on Workstation: [id number]       MRI Brain With & Without Contrast [[id number]] Collected: 04/17/25 2117     Updated: 04/17/25 2130     Narrative:      BRAIN MRI WITH AND WITHOUT CONTRAST     HISTORY: Stroke, follow up     COMPARISON: None.     FINDINGS:  Multiplanar images of the head were obtained without and with  gadolinium. No areas of restricted diffusion are seen to suggest acute  infarct. There is atrophy. There is periventricular and deep white  matter microangiopathic change. There is no midline shift or mass  effect. Intracranial flow voids appear intact. There appears to be an  old lacunar infarct within the left basal ganglia. Additional old  lacunar infarct is noted within the left thalamus. Nonspecific area of  susceptibility artifact is noted within the right [place]. Postcontrast  imaging does not demonstrate any evidence of venous occlusion. Mucosal  thickening is noted within the ethmoid sinuses. There is a right frontal  meningioma, which measures 1.1 x 0.6 cm. This is unchanged when compared  to the prior exam. No new areas of enhancement are seen. There is some  trace fluid within the right mastoid air cells.       Impression:      1. No acute intracranial abnormality.        This report was finalized on 4/17/2025 9:27 PM by Dr. Nora Pearson M.D on Workstation: [id number]       XR Chest 1 View [[id number]] Collected: 04/17/25 1517     Updated: 04/17/25 1524    Narrative:      XR CHEST 1 VW-4/17/2025     HISTORY: Stroke. Follow-up.     Heart size is within normal limits. Lungs appear free of acute  infiltrates. Bones and soft tissues are unremarkable.       Impression:      1. No acute process.        This report was finalized on 4/17/2025 3:21 PM by Dr. Peyman De León M.D on Workstation: [id number]               I have reviewed the medications.     Discharge Medications        PAUSE taking these medications        Instructions Start Date   metFORMIN 500 MG tablet  Wait to take this until: April 20, 2025  Commonly known as: GLUCOPHAGE   500 mg, Oral, Daily With Breakfast      metoprolol succinate  MG 24 hr  tablet  Wait to take this until your doctor or other care provider tells you to start again.  Hold until follow up with primary due to slow heart rate  Commonly known as: TOPROL-XL   200 mg, Oral, Daily             New Medications        Instructions Start Date   aspirin 81 MG chewable tablet   81 mg, Oral, Daily   Start Date: April 19, 2025            Changes to Medications        Instructions Start Date   levothyroxine 75 MCG tablet  Commonly known as: Synthroid  What changed:   medication strength  how much to take  when to take this   75 mcg, Oral, Every Early Morning   Start Date: April 19, 2025            Continue These Medications        Instructions Start Date   alendronate 70 MG tablet  Commonly known as: FOSAMAX   70 mg, Oral, Every 7 Days      amLODIPine 10 MG tablet  Commonly known as: NORVASC   10 mg, Oral, Daily      atorvastatin 80 MG tablet  Commonly known as: LIPITOR   80 mg, Oral, Nightly      doxazosin 2 MG tablet  Commonly known as: CARDURA   2 mg, Oral, Nightly      exemestane 25 MG tablet  Commonly known as: AROMASIN   25 mg, Oral, Daily      furosemide 20 MG tablet  Commonly known as: LASIX   20 mg, Oral, Daily      irbesartan 300 MG tablet  Commonly known as: AVAPRO   300 mg, Oral, Every Night at Bedtime      omeprazole 40 MG capsule  Commonly known as: priLOSEC   40 mg, Oral, Daily      vitamin D 1.25 MG (76078 UT) capsule capsule  Commonly known as: ERGOCALCIFEROL   50,000 Units, Oral, Every 7 Days              ---------------------------------------------------------------------------------------------  Assessment & Plan   Assessment/Problem List    Stroke-like symptoms    Plan:    Headache/abnormal vision/word finding difficulty  Hypertensive urgency  Migraine with aura  - CTA head and neck shows atherosclerosis  - MRI brain with and without contrast negative acute, meningioma appears stable in size  - Echocardiogram shows normal systolic function, grade 1 diastolic dysfunction.  LV  hypertrophy.  No significant valvular abnormalities.  Saline test negative.  - A1c 5  - , continue high intensity statin  - Neurology consulted  - Start low-dose aspirin  - Resume home blood pressure medications  - Check blood pressure twice daily for the next 10 days and record readings for follow-up     Hypertension  - Resume regimen with exception of her beta-blocker due to bradycardia  - Check blood pressure twice daily for the next 7 to 10 days and record readings for primary care follow-up     Hyperlipidemia  - Continue statin     History of thyroid cancer-S/P thyroidectomy  - TSH low, Free T4 slightly elevated  - Decrease Synthroid from 88 to 75 mcg  - Follow up with ENT recommended    Meningioma  -Relatively stable in size, further imaging not indicated per neurology     VTE Prophylaxis:  Mechanical VTE prophylaxis orders are present.    Disposition: Home    Follow-up after Discharge: Primary care, ENT    This note will serve as a discharge summary    Sandee Silverman, [place] 04/18/25 14:58 EDT    I have worn appropriate PPE during this patient encounter, sanitized my hands both with entering and exiting patient's room.    34 minutes has been spent by [facility] providers in the care of this patient while under observation status

## 2025-04-24 ENCOUNTER — OFFICE VISIT (OUTPATIENT)
Dept: ORTHOPEDIC SURGERY | Facility: CLINIC | Age: 85
End: 2025-04-24
Payer: MEDICARE

## 2025-04-24 VITALS — TEMPERATURE: 97.5 F | BODY MASS INDEX: 24.6 KG/M2 | WEIGHT: 144.1 LBS | HEIGHT: 64 IN

## 2025-04-24 DIAGNOSIS — R52 PAIN: Primary | ICD-10-CM

## 2025-04-24 DIAGNOSIS — M17.0 PRIMARY OSTEOARTHRITIS OF BOTH KNEES: ICD-10-CM

## 2025-04-24 RX ORDER — IRBESARTAN 300 MG/1
300 TABLET ORAL DAILY
COMMUNITY
Start: 2025-04-21

## 2025-04-24 RX ORDER — METHYLPREDNISOLONE ACETATE 80 MG/ML
80 INJECTION, SUSPENSION INTRA-ARTICULAR; INTRALESIONAL; INTRAMUSCULAR; SOFT TISSUE
Status: COMPLETED | OUTPATIENT
Start: 2025-04-24 | End: 2025-04-24

## 2025-04-24 RX ORDER — ERGOCALCIFEROL 1.25 MG/1
50000 CAPSULE, LIQUID FILLED ORAL
COMMUNITY
Start: 2025-02-24 | End: 2025-05-19

## 2025-04-24 RX ADMIN — METHYLPREDNISOLONE ACETATE 80 MG: 80 INJECTION, SUSPENSION INTRA-ARTICULAR; INTRALESIONAL; INTRAMUSCULAR; SOFT TISSUE at 12:02

## 2025-04-24 NOTE — PROGRESS NOTES
4/24/2025    Tiff Del Rio is here today for worsening knee pain. Pt has undergone injection of the knee in the past with good resolution of symptoms. Pt is requesting a repeat injection.     KNEE Injection Procedure Note:    Large Joint Arthrocentesis: R knee  Date/Time: 4/24/2025 12:02 PM  Consent given by: patient  Site marked: site marked  Timeout: Immediately prior to procedure a time out was called to verify the correct patient, procedure, equipment, support staff and site/side marked as required   Supporting Documentation  Indications: pain and joint swelling   Procedure Details  Location: knee - R knee  Preparation: Patient was prepped and draped in the usual sterile fashion  Needle size: 22 G  Approach: anterolateral  Medications administered: 80 mg methylPREDNISolone acetate 80 MG/ML; 2 mL lidocaine (cardiac)  Patient tolerance: patient tolerated the procedure well with no immediate complications      Large Joint Arthrocentesis: L knee  Date/Time: 4/24/2025 12:02 PM  Consent given by: patient  Site marked: site marked  Timeout: Immediately prior to procedure a time out was called to verify the correct patient, procedure, equipment, support staff and site/side marked as required   Supporting Documentation  Indications: pain and joint swelling   Procedure Details  Location: knee - L knee  Preparation: Patient was prepped and draped in the usual sterile fashion  Needle size: 22 G  Approach: anterolateral  Medications administered: 80 mg methylPREDNISolone acetate 80 MG/ML; 2 mL lidocaine (cardiac)  Patient tolerance: patient tolerated the procedure well with no immediate complications      At the conclusion of the injection I discussed the importance of continued quad strengthening exercises on a daily basis. I will see the patient back if the symptoms should fail to improve or worsen.    3 views of bilateral knees were ordered and reviewed today secondary to pain and show significant osteoarthritis.   Comparative views are unchanged    Sandy Castaneda, APRN  4/24/2025

## 2025-04-25 NOTE — CASE MANAGEMENT/SOCIAL WORK
Case Management Discharge Note      Final Note: Home via private vehicle         Selected Continued Care - Discharged on 4/18/2025 Admission date: 4/17/2025 - Discharge disposition: Home or Self Care      Destination    No services have been selected for the patient.                Durable Medical Equipment    No services have been selected for the patient.                Dialysis/Infusion    No services have been selected for the patient.                Home Medical Care    No services have been selected for the patient.                Therapy    No services have been selected for the patient.                Community Resources    No services have been selected for the patient.                Community & DME    No services have been selected for the patient.                    Transportation Services  Private: Car    Final Discharge Disposition Code: 01 - home or self-care

## 2025-07-24 ENCOUNTER — OFFICE VISIT (OUTPATIENT)
Dept: ORTHOPEDIC SURGERY | Facility: CLINIC | Age: 85
End: 2025-07-24
Payer: MEDICARE

## 2025-07-24 VITALS — TEMPERATURE: 98 F | BODY MASS INDEX: 25.16 KG/M2 | HEIGHT: 63 IN | WEIGHT: 142 LBS

## 2025-07-24 DIAGNOSIS — M17.0 PRIMARY OSTEOARTHRITIS OF BOTH KNEES: Primary | ICD-10-CM

## 2025-07-24 RX ORDER — METHYLPREDNISOLONE ACETATE 80 MG/ML
80 INJECTION, SUSPENSION INTRA-ARTICULAR; INTRALESIONAL; INTRAMUSCULAR; SOFT TISSUE
Status: COMPLETED | OUTPATIENT
Start: 2025-07-24 | End: 2025-07-24

## 2025-07-24 RX ADMIN — METHYLPREDNISOLONE ACETATE 80 MG: 80 INJECTION, SUSPENSION INTRA-ARTICULAR; INTRALESIONAL; INTRAMUSCULAR; SOFT TISSUE at 11:43

## 2025-07-24 NOTE — PROGRESS NOTES
7/24/2025    Tiff Del Rio is here today for worsening knee pain. Pt has undergone injection of the knee in the past with good resolution of symptoms. Pt is requesting a repeat injection.     KNEE Injection Procedure Note:    Large Joint Arthrocentesis: R knee  Date/Time: 7/24/2025 11:43 AM  Consent given by: patient  Site marked: site marked  Timeout: Immediately prior to procedure a time out was called to verify the correct patient, procedure, equipment, support staff and site/side marked as required   Supporting Documentation  Indications: pain and joint swelling   Procedure Details  Location: knee - R knee  Preparation: Patient was prepped and draped in the usual sterile fashion  Needle size: 22 G  Approach: anterolateral  Medications administered: 80 mg methylPREDNISolone acetate 80 MG/ML; 2 mL lidocaine (cardiac)  Patient tolerance: patient tolerated the procedure well with no immediate complications      Large Joint Arthrocentesis: L knee  Date/Time: 7/24/2025 11:43 AM  Consent given by: patient  Site marked: site marked  Timeout: Immediately prior to procedure a time out was called to verify the correct patient, procedure, equipment, support staff and site/side marked as required   Supporting Documentation  Indications: pain and joint swelling   Procedure Details  Location: knee - L knee  Preparation: Patient was prepped and draped in the usual sterile fashion  Needle size: 22 G  Approach: anterolateral  Medications administered: 80 mg methylPREDNISolone acetate 80 MG/ML; 2 mL lidocaine (cardiac)  Patient tolerance: patient tolerated the procedure well with no immediate complications      At the conclusion of the injection I discussed the importance of continued quad strengthening exercises on a daily basis. I will see the patient back if the symptoms should fail to improve or worsen.    Sandy Castaneda, APRN  7/24/2025